# Patient Record
Sex: MALE | Race: WHITE | NOT HISPANIC OR LATINO | Employment: OTHER | URBAN - METROPOLITAN AREA
[De-identification: names, ages, dates, MRNs, and addresses within clinical notes are randomized per-mention and may not be internally consistent; named-entity substitution may affect disease eponyms.]

---

## 2017-01-10 LAB
CREAT SERPL-MCNC: 1.52 MG/DL (ref 0.72–1.25)
GFR SERPL CREATININE-BSD FRML MDRD: 47 ML/MIN/1.73M2
GLUCOSE SERPL-MCNC: 114 MG/DL (ref 65–100)
POTASSIUM SERPL-SCNC: 4.6 MMOL/L (ref 3.5–5)

## 2017-10-31 ENCOUNTER — TRANSFERRED RECORDS (OUTPATIENT)
Dept: HEALTH INFORMATION MANAGEMENT | Facility: CLINIC | Age: 62
End: 2017-10-31

## 2017-10-31 LAB — HEP C HIM: NORMAL

## 2018-02-05 ENCOUNTER — OFFICE VISIT (OUTPATIENT)
Dept: FAMILY MEDICINE | Facility: OTHER | Age: 63
End: 2018-02-05
Payer: COMMERCIAL

## 2018-02-05 ENCOUNTER — TELEPHONE (OUTPATIENT)
Dept: FAMILY MEDICINE | Facility: OTHER | Age: 63
End: 2018-02-05

## 2018-02-05 VITALS
HEIGHT: 68 IN | OXYGEN SATURATION: 97 % | WEIGHT: 203.2 LBS | TEMPERATURE: 98.5 F | RESPIRATION RATE: 16 BRPM | DIASTOLIC BLOOD PRESSURE: 72 MMHG | BODY MASS INDEX: 30.8 KG/M2 | SYSTOLIC BLOOD PRESSURE: 124 MMHG | HEART RATE: 86 BPM

## 2018-02-05 DIAGNOSIS — J32.9 CHRONIC SINUSITIS, UNSPECIFIED LOCATION: Primary | ICD-10-CM

## 2018-02-05 DIAGNOSIS — I10 ESSENTIAL HYPERTENSION, BENIGN: ICD-10-CM

## 2018-02-05 DIAGNOSIS — G44.209 TENSION-TYPE HEADACHE, NOT INTRACTABLE, UNSPECIFIED CHRONICITY PATTERN: ICD-10-CM

## 2018-02-05 PROBLEM — E78.5 HYPERLIPIDEMIA: Status: ACTIVE | Noted: 2018-02-05

## 2018-02-05 PROBLEM — Z98.890 S/P COLONOSCOPIC POLYPECTOMY: Status: ACTIVE | Noted: 2018-02-05

## 2018-02-05 PROCEDURE — 99204 OFFICE O/P NEW MOD 45 MIN: CPT | Performed by: NURSE PRACTITIONER

## 2018-02-05 RX ORDER — LISINOPRIL 10 MG/1
TABLET ORAL
Refills: 1 | COMMUNITY
Start: 2017-12-24 | End: 2019-12-12

## 2018-02-05 RX ORDER — EZETIMIBE 10 MG/1
TABLET ORAL
Refills: 3 | COMMUNITY
Start: 2017-10-31 | End: 2019-12-12

## 2018-02-05 ASSESSMENT — PAIN SCALES - GENERAL: PAINLEVEL: NO PAIN (0)

## 2018-02-05 NOTE — MR AVS SNAPSHOT
After Visit Summary   2/5/2018    Krishna Dugan    MRN: [id number]           Patient Information     Date Of Birth          1955        Visit Information        Provider Department      2/5/2018 6:00 PM Mee Magdaleno APRN CNP [facility]        Today's Diagnoses     Chronic sinusitis, unspecified location    -  1    Tension-type headache, not intractable, unspecified chronicity pattern        Essential hypertension, benign          Care Instructions    - Start Zyrtec daily   - Start antibiotic treatment today for 10 days.   - Recommend Flonase daily 1-2 sprays  - Will call you to have imaging scheduled  - Follow up with Dr. Hernandez for allergy   - Follow up with me in 2 weeks.   - Continue 20mg lisinopril daily, check blood pressure daily and record readings if consistently over 140/90 please let me know.       LAM Byrd CNP            Follow-ups after your visit        Follow-up notes from your care team     Return in about 2 weeks (around 2/19/2018), or if symptoms worsen or fail to improve.      Future tests that were ordered for you today     Open Future Orders        Priority Expected Expires Ordered    CT Sinus w/o Contrast Routine  2/5/2019 2/5/2018    MR Brain w/o & w Contrast Routine  2/5/2019 2/5/2018            Who to contact     If you have questions or need follow up information about today's clinic visit or your schedule please contact [facility] directly at [phone].  Normal or non-critical lab and imaging results will be communicated to you by MyChart, letter or phone within 4 business days after the clinic has received the results. If you do not hear from us within 7 days, please contact the clinic through MyChart or phone. If you have a critical or abnormal lab result, we will notify you by phone as soon as possible.  Submit refill requests through [employer] or call your pharmacy and they will forward the refill request to us.  "Please allow 3 business days for your refill to be completed.          Additional Information About Your Visit        MyChart Information     [employer] lets you send messages to your doctor, view your test results, renew your prescriptions, schedule appointments and more. To sign up, go to [email or web address] . Click on \"Log in\" on the left side of the screen, which will take you to the Welcome page. Then click on \"Sign up Now\" on the right side of the page.     You will be asked to enter the access code listed below, as well as some personal information. Please follow the directions to create your username and password.     Your access code is: [email or web address]  Expires: 2018  6:53 PM     Your access code will  in 90 days. If you need help or a new code, please call your [facility] or [phone].        Care EveryWhere ID     This is your [facility] EveryWhere ID. This could be used by other organizations to access your [facility] medical records  [id number]        Your Vitals Were     Pulse Temperature Respirations Height Pulse Oximetry BMI (Body Mass Index)    86 98.5  F (36.9  C) (Temporal) 16 5' 7.72\" (1.72 m) 97% 31.16 kg/m2       Blood Pressure from Last 3 Encounters:   18 152/74   13 138/85    Weight from Last 3 Encounters:   18 203 lb 3.2 oz (92.2 kg)   13 197 lb (89.4 kg)                 Today's Medication Changes          These changes are accurate as of 18  6:53 PM.  If you have any questions, ask your nurse or doctor.               Start taking these medicines.        Dose/Directions    amoxicillin-clavulanate 875-125 MG per tablet   Commonly known as:  AUGMENTIN   Used for:  Chronic sinusitis, unspecified location   Started by:  Mee Magdaleno APRN CNP        Dose:  1 tablet   Take 1 tablet by mouth 2 times daily   Quantity:  20 tablet   Refills:  0            Where to get your medicines      These medications were sent to [id number] - [place] " [place], [place] - [address] AT [facility] [address]  [address], [facility] [address]     Phone:  [phone]     amoxicillin-clavulanate 875-125 MG per tablet                Primary Care Provider Office Phone # Fax #    Harshil ROJAS Nuñez [phone]       [facility] [address] [place] [address]        Equal Access to Services     [facility]: Hadii aad ku hadasho Soomaali, waaxda luqadaha, qaybta kaalmada adeegyada, waxay idiin hayaan adeeg kharash la'aan . So [facility] [phone].    ATENCIÓN: Si habla español, tiene a limon disposición servicios gratuitos de asistencia lingüística. [facility] [phone].    We comply with applicable federal civil rights laws and [place] laws. We do not discriminate on the basis of race, color, national origin, age, disability, sex, sexual orientation, or gender identity.            Thank you!     Thank you for choosing [facility]  for your care. Our goal is always to provide you with excellent care. Hearing back from our patients is one way we can continue to improve our services. Please take a few minutes to complete the written survey that you may receive in the mail after your visit with us. Thank you!             Your Updated Medication List - Protect others around you: Learn how to safely use, store and throw away your medicines at www.disposemymeds.org.          This list is accurate as of 2/5/18  6:53 PM.  Always use your most recent med list.                   Brand Name Dispense Instructions for use Diagnosis    amoxicillin-clavulanate 875-125 MG per tablet    AUGMENTIN    20 tablet    Take 1 tablet by mouth 2 times daily    Chronic sinusitis, unspecified location       azithromycin 250 MG tablet    ZITHROMAX    6 tablet    Two tablets first day, then one tablet daily for four days.    Congestion of paranasal sinus       CLARITIN 10 MG tablet   Generic drug:  loratadine      Take 10 mg by mouth daily     Perennial allergic rhinitis       ezetimibe 10 MG tablet    ZETIA     TK 1 T PO QD        fluticasone 50 MCG/ACT spray    FLONASE    1 Package    Spray 2 sprays into both nostrils daily    Perennial allergic rhinitis       * lisinopril 20 MG tablet    PRINIVIL/ZESTRIL     Take 20 mg by mouth daily.        * lisinopril 10 MG tablet    PRINIVIL/ZESTRIL          * omeprazole 40 MG capsule    priLOSEC     Take by mouth daily.        * omeprazole 20 MG CR capsule    priLOSEC     Take 20 mg by mouth        * Notice:  This list has 4 medication(s) that are the same as other medications prescribed for you. Read the directions carefully, and ask your doctor or other care provider to review them with you.
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
## 2018-02-06 NOTE — NURSING NOTE
"Chief Complaint   Patient presents with     Headache       Initial /74  Pulse 86  Temp 98.5  F (36.9  C) (Temporal)  Resp 16  Ht 5' 7.72\" (1.72 m)  Wt 203 lb 3.2 oz (92.2 kg)  SpO2 97%  BMI 31.16 kg/m2 Estimated body mass index is 31.16 kg/(m^2) as calculated from the following:    Height as of this encounter: 5' 7.72\" (1.72 m).    Weight as of this encounter: 203 lb 3.2 oz (92.2 kg).  Medication Reconciliation: complete  "

## 2018-02-06 NOTE — PATIENT INSTRUCTIONS
- Start Zyrtec daily   - Start antibiotic treatment today for 10 days.   - Recommend Flonase daily 1-2 sprays  - Will call you to have imaging scheduled  - Follow up with Dr. Hernandez for allergy   - Follow up with me in 2 weeks.   - Continue 20mg lisinopril daily, check blood pressure daily and record readings if consistently over 140/90 please let me know.       LAM Byrd CNP

## 2018-02-06 NOTE — TELEPHONE ENCOUNTER
Please help patient schedule his MRI and CT at [facility]. He will need oral sedation. Would like done on same day. Once order is faxed please call patient so he can schedule appointment.     LAM Byrd CNP

## 2018-02-06 NOTE — TELEPHONE ENCOUNTER
Spoke to patient and he would like this done in [place]. Orders faxed and they will call him to schedule.

## 2018-02-08 ENCOUNTER — TRANSFERRED RECORDS (OUTPATIENT)
Dept: HEALTH INFORMATION MANAGEMENT | Facility: CLINIC | Age: 63
End: 2018-02-08

## 2018-03-06 ENCOUNTER — TELEPHONE (OUTPATIENT)
Dept: ALLERGY | Facility: OTHER | Age: 63
End: 2018-03-06

## 2018-03-06 ENCOUNTER — OFFICE VISIT (OUTPATIENT)
Dept: ALLERGY | Facility: OTHER | Age: 63
End: 2018-03-06
Payer: COMMERCIAL

## 2018-03-06 VITALS
TEMPERATURE: 98.3 F | BODY MASS INDEX: 31.35 KG/M2 | SYSTOLIC BLOOD PRESSURE: 130 MMHG | DIASTOLIC BLOOD PRESSURE: 64 MMHG | WEIGHT: 204.5 LBS | HEART RATE: 78 BPM | OXYGEN SATURATION: 96 %

## 2018-03-06 DIAGNOSIS — J30.89 ALLERGIC RHINITIS DUE TO DUST MITE: Primary | ICD-10-CM

## 2018-03-06 DIAGNOSIS — J30.81 ALLERGIC RHINITIS DUE TO ANIMAL DANDER: ICD-10-CM

## 2018-03-06 DIAGNOSIS — J34.89 SINUS PRESSURE: ICD-10-CM

## 2018-03-06 PROCEDURE — 95004 PERQ TESTS W/ALRGNC XTRCS: CPT | Performed by: ALLERGY & IMMUNOLOGY

## 2018-03-06 PROCEDURE — 99204 OFFICE O/P NEW MOD 45 MIN: CPT | Mod: 25 | Performed by: ALLERGY & IMMUNOLOGY

## 2018-03-06 RX ORDER — ASPIRIN 81 MG/1
81 TABLET, CHEWABLE ORAL
COMMUNITY
End: 2022-07-29

## 2018-03-06 RX ORDER — CETIRIZINE HYDROCHLORIDE 10 MG/1
10 TABLET ORAL DAILY
COMMUNITY
End: 2021-04-23

## 2018-03-06 RX ORDER — AZELASTINE 1 MG/ML
1-2 SPRAY, METERED NASAL 2 TIMES DAILY
Qty: 1 BOTTLE | Refills: 3 | Status: SHIPPED | OUTPATIENT
Start: 2018-03-06 | End: 2019-12-12

## 2018-03-06 RX ORDER — FLUTICASONE PROPIONATE 50 MCG
2 SPRAY, SUSPENSION (ML) NASAL DAILY
Qty: 1 BOTTLE | Refills: 11 | Status: SHIPPED | OUTPATIENT
Start: 2018-03-06 | End: 2023-07-17

## 2018-03-06 NOTE — ASSESSMENT & PLAN NOTE
Perennial nasal and ocular symptoms.  Associated sinus pressure.  Associated decreased sense of smell.  Recently had CT scan of sinuses.  I do not have these results.    Allergy testing positive for cat, dog, dust mites.    -Signed release of information to get CT scan of sinuses.  -Flonase 2 sprays per nostril daily.  -Astelin 2 sprays per nostril twice daily.  -Zyrtec or Allegra daily as needed.  -Allergen avoidance measures were discussed and literature provided.  -Consider allergen immunotherapy.  -If CT scan of sinuses shows chronic sinusitis will treat with extended course of antibiotics and possibly prednisone.  -Return to clinic in 2 months.

## 2018-03-06 NOTE — LETTER
3/6/2018         RE: Krishna Dugan  [address] [facility] [address]        Dear Colleague,    Thank you for referring your patient, Krishna Dugan, to the [facility]. Please see a copy of my visit note below.    Krishna Dugan is a 62 year old White male with previous medical history significant for hyperlipidemia and allergic rhinitis. Krishna Dugan is being seen today for evaluation of nasal congestion.     The patient reports that for years he has had perennial rhinorrhea, nasal itching, sneezing, congestion, postnasal drainage, ocular itching, ocular watering and ocular redness.  He additionally endorses persistent sinus pressure behind his eyes.  He endorses a decreased sense of smell.  These symptoms are present daily.  He has clear nasal discharge.  No history of sinus surgery.  He recently had a CT scan of the sinuses and an MRI of his brain but these reports have not been sent to [facility] yet.  He reports a persistent cracking and popping in his head.  He was seen by ENT 1-2 years ago.  No interventions were done.  He uses Flonase intermittently in the morning and has found it to be beneficial, but the effect decreased the longer he used it.  Oral antihistamines have not been beneficial.  He underwent allergy testing in 2008.  I reviewed this.  He was allergic to cats and dogs.  He reports cats cause increased symptoms.  He has a dog in his home.  No history of allergen immunotherapy.  No history of nasal polyposis.  Symptoms are additionally made worse with perfumes.    The patient has no history of asthma, eczema, food allergies, medications allergies or hives.     ENVIRONMENTAL HISTORY: [place] heated with a forced air. They does have central air conditioning. The patient's bedroom is furnished with hard césar in bedroom and fabric window coverings.  Pets inside the house include 2  outdoorcat(s)  and 1 dog(s). There is not history of cockroach or mice infestation. There is/are 0 smokers in the house.  The house does not have a damp basement.     Past Medical History:   Diagnosis Date     Diagnostic skin and sensitization tests 9/08 skin tests pos. for cat/dog ONLY (all other environmental allergens NEGATIVE)     History reviewed. No pertinent family history.  History reviewed. No pertinent surgical history.    REVIEW OF SYSTEMS:  General: negative for weight gain. negative for weight loss. negative for changes in sleep.   Ears: negative for fullness. positive  for hearing loss. negative for dizziness.   Nose: positive  for snoring.positive  for changes in smell. positive  for drainage.   Eyes: positive for eye watering. positive  for eye itching. negative for vision changes. positive  for eye redness.  Throat: negative for hoarseness. positive  for sore throat. negative for trouble swallowing.   Lungs: negative for shortness of breath.negative for wheezing. negative for sputum production.   Cardiovascular: negative for chest pain. positive  for swelling of ankles. positive  for fast or irregular heartbeat.   Gastrointestinal: negative for nausea. positive  for heartburn. positive  for acid reflux.   Musculoskeletal: negative for joint pain. positive  for joint stiffness. negative for joint swelling.   Neurologic: negative for seizures. negative for fainting. positive  for weakness.   Psychiatric: negative for changes in mood. positive  for anxiety.   Endocrine: negative for cold intolerance. negative for heat intolerance. negative for tremors.   Lymphatic: negative for lower extremity swelling. negative for lymph node swelling.   Hematologic: negative for easy bruising. negative for easy bleeding.  Integumentary: negative for rash. negative for scaling. negative for nail changes.       Current Outpatient Prescriptions:      aspirin 81 MG chewable tablet, Take 81 mg by mouth, Disp: , Rfl:      VITAMIN D,  CHOLECALCIFEROL, PO, Take 5,000 Units by mouth daily, Disp: , Rfl:      cetirizine (ZYRTEC) 10 MG tablet, Take 10 mg by mouth daily, Disp: , Rfl:      azelastine (ASTELIN) 0.1 % spray, Spray 1-2 sprays into both nostrils 2 times daily, Disp: 1 Bottle, Rfl: 3     fluticasone (FLONASE) 50 MCG/ACT spray, Spray 2 sprays into both nostrils daily, Disp: 1 Bottle, Rfl: 11     ezetimibe (ZETIA) 10 MG tablet, TK 1 T PO QD, Disp: , Rfl: 3     lisinopril (PRINIVIL/ZESTRIL) 10 MG tablet, , Disp: , Rfl: 1     omeprazole (PRILOSEC) 40 MG capsule, Take by mouth daily. , Disp: , Rfl:      omeprazole (PRILOSEC) 20 MG CR capsule, Take 20 mg by mouth, Disp: , Rfl:      amoxicillin-clavulanate (AUGMENTIN) 875-125 MG per tablet, Take 1 tablet by mouth 2 times daily (Patient not taking: Reported on 3/6/2018), Disp: 20 tablet, Rfl: 0     loratadine (CLARITIN) 10 MG tablet, Take 10 mg by mouth daily, Disp: , Rfl:      azithromycin (ZITHROMAX) 250 MG tablet, Two tablets first day, then one tablet daily for four days. (Patient not taking: Reported on 3/6/2018), Disp: 6 tablet, Rfl: 0     lisinopril (PRINIVIL,ZESTRIL) 20 MG tablet, Take 20 mg by mouth daily. , Disp: , Rfl:     There is no immunization history on file for this patient.  Allergies   Allergen Reactions     Valdecoxib      Ears turn purple  Facial flushing  Bextra         EXAM:   Constitutional:  Appears well-developed and well-nourished. No distress.   HEENT:   Head: Normocephalic.   Right Ear: External ear normal. TM normal  Left Ear: External ear normal. TM normal  Mouth/Throat: No oropharyngeal exudate present.   No cobblestoning of posterior oropharynx.   Boggy nasal tissue and pale.    Eyes: Conjunctivae are non-erythematous. Ocular watering noted.   No maxillary or frontal sinus tenderness to palpation.   Cardiovascular: Normal rate, regular rhythm and normal heart sounds. Exam reveals no gallop and no friction rub.   No murmur heard.  Respiratory: Effort normal and breath  sounds normal. No respiratory distress. No wheezes. No rales.   Musculoskeletal: Normal range of motion.   Lymphadenopathy:   No cervical adenopathy.   No lower extremity edema.   Neuro: Oriented to person, place, and time.  Skin: Skin is warm and dry. No rash noted.   Psychiatric: Normal mood and affect.     Nursing note and vitals reviewed.      WORKUP:   Skin testing  Positive for cats, dogs and both species of dust mites.     ASSESSMENT/PLAN:  Problem List Items Addressed This Visit        Respiratory    Allergic rhinitis due to animal dander    Relevant Medications    cetirizine (ZYRTEC) 10 MG tablet    azelastine (ASTELIN) 0.1 % spray    fluticasone (FLONASE) 50 MCG/ACT spray    Other Relevant Orders    ALLERGY SKIN TESTS,ALLERGENS (Completed)    Allergic rhinitis due to dust mite - Primary     Perennial nasal and ocular symptoms.  Associated sinus pressure.  Associated decreased sense of smell.  Recently had CT scan of sinuses.  I do not have these results.    Allergy testing positive for cat, dog, dust mites.    -Signed release of information to get CT scan of sinuses.  -Flonase 2 sprays per nostril daily.  -Astelin 2 sprays per nostril twice daily.  -Zyrtec or Allegra daily as needed.  -Allergen avoidance measures were discussed and literature provided.  -Consider allergen immunotherapy.  -If CT scan of sinuses shows chronic sinusitis will treat with extended course of antibiotics and possibly prednisone.  -Return to clinic in 2 months.         Relevant Medications    cetirizine (ZYRTEC) 10 MG tablet    azelastine (ASTELIN) 0.1 % spray    fluticasone (FLONASE) 50 MCG/ACT spray    Other Relevant Orders    ALLERGY SKIN TESTS,ALLERGENS (Completed)    Sinus pressure    Relevant Orders    ALLERGY SKIN TESTS,ALLERGENS (Completed)          Chart documentation with Dragon Voice recognition Software. Although reviewed after completion, some words and grammatical errors may remain.    Ted Hernandez, DO    Allergy/Immunology  [facility], [place] [facility]      Again, thank you for allowing me to participate in the care of your patient.        Sincerely,        Ted Hernandez, DO

## 2018-03-06 NOTE — PATIENT INSTRUCTIONS
Allergy Staff Appt Hours Shot Hours Locations    Physician     Ted Hernandez DO       Support Staff     Wendy VUONG RN      Janki SILVA MA  Monday:                      [place] 8-7     Tuesday:         [place] 8-5     Wednesday:        [place]: 7-5     Friday:        [place] 7-5   [place]        Monday: 9-5:50        Wednesday: 2-5:50        Friday: 7-12:50     [place]        Tuesday: 7-10:50        Thursday: 1:30-6:30     [place] Monday: 7:10-4:50        Tuesday: 12:30-6:30        Thursday: 7-11:50 [facility]  [address] [place] [address]  Appt Line: [phone]  Allergy RN (Monday):  [phone]    [facility]  [address] [place] [address]  Appt Line: [phone]  Allergy RN (Tues & Wed):  [phone]    [facility]  [address] [place] [address]  Appt Line: [phone]  Allergy RN (Friday):  [phone]       Important Scheduling Information  Aspirin Desensitization: Appt will last 2 clinic days. Please call the Allergy RN line for your clinic to schedule. Discontinue antihistamines 7 days prior to the appointment.     Food Challenges: Appt will last 3-4 hours. Please call the Allergy RN line for your clinic to schedule. Discontinue antihistamines 7 days prior to the appointment.     Penicillin Testing: Appt will last 2-3 hours. Please call the Allergy RN line for your clinic to schedule. Discontinue antihistamines 7 days prior to the appointment.     Skin Testing: Appt will about 40 minutes. Call the appointment line for your clinic to schedule. Discontinue antihistamines 7 days prior to the appointment.     Venom Testing: Appt will last 2-3 hours. Please call the Allergy RN line for your clinic to schedule. Discontinue antihistamines 7 days prior to the appointment.     Thank you for trusting us with your Allergy, Asthma, and Immunology care. Please feel free to contact us with any questions or concerns you may have.      - Flonase 2  sprays/nostril daily.   - Azelastine 2 sprays/nostril twice daily as needed.   - Zyrtec, Allegra, Claritin or Xyzal daily as needed.   - Consider allergy shots.   - Awaiting ct of sinuses results.     AEROALLERGEN AVOIDANCE INSTRUCTIONS  DUST MITES  Dust mites can never be entirely eliminated in the house no matter how clean your house is. Dust mites are attracted to warm, moist areas and feed on dead skin flakes. Here are tips to minimize dust mites in your home.  1.  Encase pillows and mattress/box springs in zippered allergy covers.  2.  Wash bedding in hot water (at least 130 F) every 7-14 days.  3.  Avoid curtains, carpet, and upholstered furniture if possible.  4.  Use HEPA air filters and a HEPA filter vacuum . Change filters monthly. Vacuum weekly.  5.  Keep bedroom simple, avoiding clutter, so it can quickly be dusted.  6.  Cover heating vents with vent filters.  7.  Keep stuffed toys in a closed container and wash or freeze regularly.  8.  Keep clothing in the closet with the door closed.  PETS  Pets present many problems for people with allergies. Dander from pets is very difficult to remove and also is a food source for dust mites.  1.  If possible, find the pet a new home.  2.  If not possible, keep the pet outdoors. Never allow the pet into the bedroom.  3.  Wash pet weekly in warm water.  4.  Encase mattresses, pillows, and box springs in allergen-proof covers.  5.  Use HEPA air filters and a HEPA filter vacuum . Change filters monthly.      Controlling Allergens: Dust Mites  Constant exposure to allergens means constant allergy symptoms. That s why controlling or avoiding the allergens that cause your symptoms is an important part of your treatment. If you are allergic to dust mites, the tips below can help to lessen your exposure to dust mites.     Wash all bedding in hot water.   Dust mite allergy  Dust mites are a common cause of nasal allergies. These mites are tiny organisms that  live in bedding, upholstered furniture, and carpet. They live in warm, humid conditions. House-dust mites are almost impossible to get rid of. But you can keep them under control.  Make changes to your home  Some furniture, like sofas and chairs, hold dust mites. To lessen the problem:    Choose nonfabric upholstery, like leather or vinyl.    Replace horizontal blinds with pull-down shades or vertical blinds.    Use washable curtains instead of heavy drapes.    Have as little carpeting as possible.    Cover your mattress, box spring, and pillows in allergy-proof casings.  Housecleaning  Here are some tips:    Wash sheets, blankets, and mattress pads every 1 to 2 weeks in hot water (at least 130 F).    Remove stuffed animals and other things that collect dust, such as wall hangings, knickknacks, and books--especially in the bedroom.    Dust your home every week with a damp cloth. Vacuum once a week. Use HEPA (high efficiency particulate air) filters or double-ply bags in the vacuum . Or, use a vacuum designed to lessen allergens.    If someone else can t dust and vacuum for you, wearing a filter mask may help.  Reduce indoor humidity  Dust mites need moist air to live. Use a dehumidifier to reduce air moisture. Don t use humidifiers, or vaporizers.  Talk with your healthcare provider about other ways to reduce dust in your home. Ask about medicines that can help with your allergy symptoms.  Date Last Reviewed: 9/1/2016 2000-2017 [employer]. [address] [place] [address]. All rights reserved. This information is not intended as a substitute for professional medical care. Always follow your healthcare professional's instructions.        Controlling Allergens: Dust Mites in the Bedroom    Many people with asthma are allergic to dust mites. Dust mites are tiny bugs that live in warm, damp places. They are too small to see, but they live in mattresses, pillows, upholstered furniture,  and house dust. Dust mite allergy can cause asthma flare-ups. If you have this allergy, there are many steps you can take to control dust mites at home.  Take these steps to control dust mites in your bed and bedroom:  1. Keep all clothing in a closet, with the door shut.  2. Make sure the room is not too humid. It should be below 50% humidity.  3. Choose wood, leather, or vinyl for furniture instead of upholstery.  4. Wash all bedding, pillows, and stuffed toys in hot water (130 F) every week. Remove any items that cannot be washed.  5. Use special covers on pillows, mattresses, and box springs. These covers are made for people with allergies.  6. If you can, replace carpeting with tile or hardwood césar. Use washable throw rugs, or don't use rugs at all.  7. Dust furniture with a damp cloth at least once a week.  8. Use an air conditioner or a dehumidifier to reduce humidity and filter the air. Clean the filter regularly.  9. Use pull-down shades or vertical window blinds that can be easily cleaned. Use these instead of curtains or drapes.  10. Use filters over heater vents.  Date Last Reviewed: 10/1/2016    7417-6666 [employer]. [address] [place] [address], [place] [address]. All rights reserved. This information is not intended as a substitute for professional medical care. Always follow your healthcare professional's instructions.

## 2018-03-06 NOTE — NURSING NOTE
Per provider verbal order, placed Adult Environmental Panel scratch test.  Consent was obtained prior to procedure.  Once panels were placed, patient was monitored for 15 minutes in clinic.  RN read test after 15 minutes and provider was notified of results.  Pt tolerated procedure well.  All questions and concerns were addressed at office visit.     Wendy Maciel RN

## 2018-03-06 NOTE — PROGRESS NOTES
Krishna Dugan is a 62 year old White male with previous medical history significant for hyperlipidemia and allergic rhinitis. Krishna Dugan is being seen today for evaluation of nasal congestion.     The patient reports that for years he has had perennial rhinorrhea, nasal itching, sneezing, congestion, postnasal drainage, ocular itching, ocular watering and ocular redness.  He additionally endorses persistent sinus pressure behind his eyes.  He endorses a decreased sense of smell.  These symptoms are present daily.  He has clear nasal discharge.  No history of sinus surgery.  He recently had a CT scan of the sinuses and an MRI of his brain but these reports have not been sent to [place] yet.  He reports a persistent cracking and popping in his head.  He was seen by ENT 1-2 years ago.  No interventions were done.  He uses Flonase intermittently in the morning and has found it to be beneficial, but the effect decreased the longer he used it.  Oral antihistamines have not been beneficial.  He underwent allergy testing in 2008.  I reviewed this.  He was allergic to cats and dogs.  He reports cats cause increased symptoms.  He has a dog in his home.  No history of allergen immunotherapy.  No history of nasal polyposis.  Symptoms are additionally made worse with perfumes.    The patient has no history of asthma, eczema, food allergies, medications allergies or hives.     ENVIRONMENTAL HISTORY: [place] The home is heated with a forced air. They does have central air conditioning. The patient's bedroom is furnished with hard césar in bedroom and fabric window coverings.  Pets inside the house include 2  outdoorcat(s) and 1 dog(s). There is not history of cockroach or mice infestation. There is/are 0 smokers in the house.  The house does not have a damp basement.     Past Medical History:   Diagnosis Date     Diagnostic skin and sensitization tests 9/08 skin tests  pos. for cat/dog ONLY (all other environmental allergens NEGATIVE)     History reviewed. No pertinent family history.  History reviewed. No pertinent surgical history.    REVIEW OF SYSTEMS:  General: negative for weight gain. negative for weight loss. negative for changes in sleep.   Ears: negative for fullness. positive  for hearing loss. negative for dizziness.   Nose: positive  for snoring.positive  for changes in smell. positive  for drainage.   Eyes: positive for eye watering. positive  for eye itching. negative for vision changes. positive  for eye redness.  Throat: negative for hoarseness. positive  for sore throat. negative for trouble swallowing.   Lungs: negative for shortness of breath.negative for wheezing. negative for sputum production.   Cardiovascular: negative for chest pain. positive  for swelling of ankles. positive  for fast or irregular heartbeat.   Gastrointestinal: negative for nausea. positive  for heartburn. positive  for acid reflux.   Musculoskeletal: negative for joint pain. positive  for joint stiffness. negative for joint swelling.   Neurologic: negative for seizures. negative for fainting. positive  for weakness.   Psychiatric: negative for changes in mood. positive  for anxiety.   Endocrine: negative for cold intolerance. negative for heat intolerance. negative for tremors.   Lymphatic: negative for lower extremity swelling. negative for lymph node swelling.   Hematologic: negative for easy bruising. negative for easy bleeding.  Integumentary: negative for rash. negative for scaling. negative for nail changes.       Current Outpatient Prescriptions:      aspirin 81 MG chewable tablet, Take 81 mg by mouth, Disp: , Rfl:      VITAMIN D, CHOLECALCIFEROL, PO, Take 5,000 Units by mouth daily, Disp: , Rfl:      cetirizine (ZYRTEC) 10 MG tablet, Take 10 mg by mouth daily, Disp: , Rfl:      azelastine (ASTELIN) 0.1 % spray, Spray 1-2 sprays into both nostrils 2 times daily, Disp: 1 Bottle,  Rfl: 3     fluticasone (FLONASE) 50 MCG/ACT spray, Spray 2 sprays into both nostrils daily, Disp: 1 Bottle, Rfl: 11     ezetimibe (ZETIA) 10 MG tablet, TK 1 T PO QD, Disp: , Rfl: 3     lisinopril (PRINIVIL/ZESTRIL) 10 MG tablet, , Disp: , Rfl: 1     omeprazole (PRILOSEC) 40 MG capsule, Take by mouth daily. , Disp: , Rfl:      omeprazole (PRILOSEC) 20 MG CR capsule, Take 20 mg by mouth, Disp: , Rfl:      amoxicillin-clavulanate (AUGMENTIN) 875-125 MG per tablet, Take 1 tablet by mouth 2 times daily (Patient not taking: Reported on 3/6/2018), Disp: 20 tablet, Rfl: 0     loratadine (CLARITIN) 10 MG tablet, Take 10 mg by mouth daily, Disp: , Rfl:      azithromycin (ZITHROMAX) 250 MG tablet, Two tablets first day, then one tablet daily for four days. (Patient not taking: Reported on 3/6/2018), Disp: 6 tablet, Rfl: 0     lisinopril (PRINIVIL,ZESTRIL) 20 MG tablet, Take 20 mg by mouth daily. , Disp: , Rfl:     There is no immunization history on file for this patient.  Allergies   Allergen Reactions     Valdecoxib      Ears turn purple  Facial flushing  Bextra         EXAM:   Constitutional:  Appears well-developed and well-nourished. No distress.   HEENT:   Head: Normocephalic.   Right Ear: External ear normal. TM normal  Left Ear: External ear normal. TM normal  Mouth/Throat: No oropharyngeal exudate present.   No cobblestoning of posterior oropharynx.   Boggy nasal tissue and pale.    Eyes: Conjunctivae are non-erythematous. Ocular watering noted.   No maxillary or frontal sinus tenderness to palpation.   Cardiovascular: Normal rate, regular rhythm and normal heart sounds. Exam reveals no gallop and no friction rub.   No murmur heard.  Respiratory: Effort normal and breath sounds normal. No respiratory distress. No wheezes. No rales.   Musculoskeletal: Normal range of motion.   Lymphadenopathy:   No cervical adenopathy.   No lower extremity edema.   Neuro: Oriented to person, place, and time.  Skin: Skin is warm and  dry. No rash noted.   Psychiatric: Normal mood and affect.     Nursing note and vitals reviewed.      WORKUP:   Skin testing  Positive for cats, dogs and both species of dust mites.     ASSESSMENT/PLAN:  Problem List Items Addressed This Visit        Respiratory    Allergic rhinitis due to animal dander    Relevant Medications    cetirizine (ZYRTEC) 10 MG tablet    azelastine (ASTELIN) 0.1 % spray    fluticasone (FLONASE) 50 MCG/ACT spray    Other Relevant Orders    ALLERGY SKIN TESTS,ALLERGENS (Completed)    Allergic rhinitis due to dust mite - Primary     Perennial nasal and ocular symptoms.  Associated sinus pressure.  Associated decreased sense of smell.  Recently had CT scan of sinuses.  I do not have these results.    Allergy testing positive for cat, dog, dust mites.    -Signed release of information to get CT scan of sinuses.  -Flonase 2 sprays per nostril daily.  -Astelin 2 sprays per nostril twice daily.  -Zyrtec or Allegra daily as needed.  -Allergen avoidance measures were discussed and literature provided.  -Consider allergen immunotherapy.  -If CT scan of sinuses shows chronic sinusitis will treat with extended course of antibiotics and possibly prednisone.  -Return to clinic in 2 months.         Relevant Medications    cetirizine (ZYRTEC) 10 MG tablet    azelastine (ASTELIN) 0.1 % spray    fluticasone (FLONASE) 50 MCG/ACT spray    Other Relevant Orders    ALLERGY SKIN TESTS,ALLERGENS (Completed)    Sinus pressure    Relevant Orders    ALLERGY SKIN TESTS,ALLERGENS (Completed)          Chart documentation with Dragon Voice recognition Software. Although reviewed after completion, some words and grammatical errors may remain.    Ted Hernandez,    Allergy/Immunology  [facility] [place] and [place], [place]

## 2018-03-06 NOTE — MR AVS SNAPSHOT
After Visit Summary   3/6/2018    Krishna Dugan    MRN: [id number]           Patient Information     Date Of Birth          1955        Visit Information        Provider Department      3/6/2018 3:00 PM Ted Hernandez DO [facility]        Today's Diagnoses     Allergic rhinitis due to dust mite    -  1    Allergic rhinitis due to animal dander        Perennial allergic rhinitis          Care Instructions    Allergy Staff Appt Hours Shot Hours Locations    Physician     Ted Hernandez DO       Support Staff     COLETTE Eagle MA  Monday:                      [place] 8-7     Tuesday:         [place] 8-5     Wednesday:        [place]: 7-5     Friday:        [place] 7-5   [place]        Monday: 9-5:50        Wednesday: 2-5:50        Friday: 7-12:50     [place]        Tuesday: 7-10:50        Thursday: 1:30-6:30     [place] Monday: 7:10-4:50        Tuesday: 12:30-6:30        Thursday: 7-11:50 [facility]  [address] [place] [address]  Appt Line: [phone]  Allergy RN (Monday):  [phone]    [facility]  [address] [place] [address]  Appt Line: [phone]  Allergy RN (Tues & Wed):  [phone]    [facility]  [address] [place] [address]  Appt Line: [phone]  Allergy RN (Friday):  [phone]       Important Scheduling Information  Aspirin Desensitization: Appt will last 2 clinic days. Please call the Allergy RN line for your clinic to schedule. Discontinue antihistamines 7 days prior to the appointment.     Food Challenges: Appt will last 3-4 hours. Please call the Allergy RN line for your clinic to schedule. Discontinue antihistamines 7 days prior to the appointment.     Penicillin Testing: Appt will last 2-3 hours. Please call the Allergy RN line for your clinic to schedule. Discontinue antihistamines 7 days prior to the appointment.     Skin Testing: Appt will about 40 minutes. Call  the appointment line for your clinic to schedule. Discontinue antihistamines 7 days prior to the appointment.     Venom Testing: Appt will last 2-3 hours. Please call the Allergy RN line for your clinic to schedule. Discontinue antihistamines 7 days prior to the appointment.     Thank you for trusting us with your Allergy, Asthma, and Immunology care. Please feel free to contact us with any questions or concerns you may have.      - Flonase 2 sprays/nostril daily.   - Azelastine 2 sprays/nostril twice daily as needed.   - Zyrtec, Allegra, Claritin or Xyzal daily as needed.   - Consider allergy shots.   - Awaiting ct of sinuses results.     AEROALLERGEN AVOIDANCE INSTRUCTIONS  DUST MITES  Dust mites can never be entirely eliminated in the house no matter how clean your house is. Dust mites are attracted to warm, moist areas and feed on dead skin flakes. Here are tips to minimize dust mites in your home.  1.  Encase pillows and mattress/box springs in zippered allergy covers.  2.  Wash bedding in hot water (at least 130 F) every 7-14 days.  3.  Avoid curtains, carpet, and upholstered furniture if possible.  4.  Use HEPA air filters and a HEPA filter vacuum . Change filters monthly. Vacuum weekly.  5.  Keep bedroom simple, avoiding clutter, so it can quickly be dusted.  6.  Cover heating vents with vent filters.  7.  Keep stuffed toys in a closed container and wash or freeze regularly.  8.  Keep clothing in the closet with the door closed.  PETS  Pets present many problems for people with allergies. Dander from pets is very difficult to remove and also is a food source for dust mites.  1.  If possible, find the pet a new home.  2.  If not possible, keep the pet outdoors. Never allow the pet into the bedroom.  3.  Wash pet weekly in warm water.  4.  Encase mattresses, pillows, and box springs in allergen-proof covers.  5.  Use HEPA air filters and a HEPA filter vacuum . Change filters  monthly.      Controlling Allergens: Dust Mites  Constant exposure to allergens means constant allergy symptoms. That s why controlling or avoiding the allergens that cause your symptoms is an important part of your treatment. If you are allergic to dust mites, the tips below can help to lessen your exposure to dust mites.     Wash all bedding in hot water.   Dust mite allergy  Dust mites are a common cause of nasal allergies. These mites are tiny organisms that live in bedding, upholstered furniture, and carpet. They live in warm, humid conditions. House-dust mites are almost impossible to get rid of. But you can keep them under control.  Make changes to your home  Some furniture, like sofas and chairs, hold dust mites. To lessen the problem:    Choose nonfabric upholstery, like leather or vinyl.    Replace horizontal blinds with pull-down shades or vertical blinds.    Use washable curtains instead of heavy drapes.    Have as little carpeting as possible.    Cover your mattress, box spring, and pillows in allergy-proof casings.  Housecleaning  Here are some tips:    Wash sheets, blankets, and mattress pads every 1 to 2 weeks in hot water (at least 130 F).    Remove stuffed animals and other things that collect dust, such as wall hangings, knickknacks, and books--especially in the bedroom.    Dust your home every week with a damp cloth. Vacuum once a week. Use HEPA (high efficiency particulate air) filters or double-ply bags in the vacuum . Or, use a vacuum designed to lessen allergens.    If someone else can t dust and vacuum for you, wearing a filter mask may help.  Reduce indoor humidity  Dust mites need moist air to live. Use a dehumidifier to reduce air moisture. Don t use humidifiers, or vaporizers.  Talk with your healthcare provider about other ways to reduce dust in your home. Ask about medicines that can help with your allergy symptoms.  Date Last Reviewed: 9/1/2016 2000-2017 [employer]. [address] [place], [place] [id number]. All rights reserved. This information is not intended as a substitute for professional medical care. Always follow your healthcare professional's instructions.        Controlling Allergens: Dust Mites in the Bedroom    Many people with asthma are allergic to dust mites. Dust mites are tiny bugs that live in warm, damp places. They are too small to see, but they live in mattresses, pillows, upholstered furniture, and house dust. Dust mite allergy can cause asthma flare-ups. If you have this allergy, there are many steps you can take to control dust mites at home.  Take these steps to control dust mites in your bed and bedroom:  1. Keep all clothing in a closet, with the door shut.  2. Make sure the room is not too humid. It should be below 50% humidity.  3. Choose wood, leather, or vinyl for furniture instead of upholstery.  4. Wash all bedding, pillows, and stuffed toys in hot water (130 F) every week. Remove any items that cannot be washed.  5. Use special covers on pillows, mattresses, and box springs. These covers are made for people with allergies.  6. If you can, replace carpeting with tile or hardwood césar. Use washable throw rugs, or don't use rugs at all.  7. Dust furniture with a damp cloth at least once a week.  8. Use an air conditioner or a dehumidifier to reduce humidity and filter the air. Clean the filter regularly.  9. Use pull-down shades or vertical window blinds that can be easily cleaned. Use these instead of curtains or drapes.  10. Use filters over heater vents.  Date Last Reviewed: 10/1/2016    2624-3404 [employer]. [address] [place] [address]. All rights reserved. This information is not intended as a substitute for professional medical care. Always follow your healthcare professional's instructions.                Follow-ups after your visit        Who to contact     If you have questions or need follow  "up information about today's clinic visit or your schedule please contact [facility] directly at [phone].  Normal or non-critical lab and imaging results will be communicated to you by MyChart, letter or phone within 4 business days after the clinic has received the results. If you do not hear from us within 7 days, please contact the clinic through [employer]Rhart or phone. If you have a critical or abnormal lab result, we will notify you by phone as soon as possible.  Submit refill requests through [employer] or call your pharmacy and they will forward the refill request to us. Please allow 3 business days for your refill to be completed.          Additional Information About Your Visit        MyChart Information     [employer] lets you send messages to your doctor, view your test results, renew your prescriptions, schedule appointments and more. To sign up, go to [email or web address] . Click on \"Log in\" on the left side of the screen, which will take you to the Welcome page. Then click on \"Sign up Now\" on the right side of the page.     You will be asked to enter the access code listed below, as well as some personal information. Please follow the directions to create your username and password.     Your access code is: [email or web address]  Expires: 2018  6:53 PM     Your access code will  in 90 days. If you need help or a new code, please call your [place] clinic or [phone].        Care EveryWhere ID     This is your Care EveryWhere ID. This could be used by other organizations to access your [place] medical records  [id number]        Your Vitals Were     Pulse Temperature Pulse Oximetry BMI (Body Mass Index)          78 98.3  F (36.8  C) (Oral) 96% 31.35 kg/m2         Blood Pressure from Last 3 Encounters:   18 130/64   18 124/72   13 138/85    Weight from Last 3 Encounters:   18 92.8 kg (204 lb 8 oz)   18 92.2 kg (203 lb 3.2 oz)   13 89.4 kg (197 lb)    "           Today, you had the following     No orders found for display         Today's Medication Changes          These changes are accurate as of 3/6/18  4:11 PM.  If you have any questions, ask your nurse or doctor.               Start taking these medicines.        Dose/Directions    azelastine 0.1 % spray   Commonly known as:  ASTELIN   Used for:  Allergic rhinitis due to dust mite, Allergic rhinitis due to animal dander   Started by:  Ted Hernandez,         Dose:  1-2 spray   Spray 1-2 sprays into both nostrils 2 times daily   Quantity:  1 Bottle   Refills:  3            Where to get your medicines      These medications were sent to [facility] [address] [facility] [address] AT [facility] [address]  [address] [facility] [address], [place] [address]     Phone:  [phone]     azelastine 0.1 % spray    fluticasone 50 MCG/ACT spray                Primary Care Provider Office Phone # Fax #    Harshil Nuñez [phone]       [facility] [address] [place] [address]        Equal Access to Services     [facility] AH: Hadii aad ku hadasho Soomaali, waaxda luqadaha, qaybta kaalmada adeegyada, radha tadeo . So [facility] [phone].    ATENCIÓN: Si habla español, tiene a limon disposición servicios gratuitos de asistencia lingüística. [facility] [phone].    We comply with applicable federal civil rights laws and [place] laws. We do not discriminate on the basis of race, color, national origin, age, disability, sex, sexual orientation, or gender identity.            Thank you!     Thank you for choosing [facility]  for your care. Our goal is always to provide you with excellent care. Hearing back from our patients is one way we can continue to improve our services. Please take a few minutes to complete the written survey that you may receive in the mail after your visit with us. Thank you!             Your Updated  Medication List - Protect others around you: Learn how to safely use, store and throw away your medicines at www.disposemymeds.org.          This list is accurate as of 3/6/18  4:11 PM.  Always use your most recent med list.                   Brand Name Dispense Instructions for use Diagnosis    amoxicillin-clavulanate 875-125 MG per tablet    AUGMENTIN    20 tablet    Take 1 tablet by mouth 2 times daily    Chronic sinusitis, unspecified location       aspirin 81 MG chewable tablet      Take 81 mg by mouth        azelastine 0.1 % spray    ASTELIN    1 Bottle    Spray 1-2 sprays into both nostrils 2 times daily    Allergic rhinitis due to dust mite, Allergic rhinitis due to animal dander       azithromycin 250 MG tablet    ZITHROMAX    6 tablet    Two tablets first day, then one tablet daily for four days.    Congestion of paranasal sinus       cetirizine 10 MG tablet    zyrTEC     Take 10 mg by mouth daily        CLARITIN 10 MG tablet   Generic drug:  loratadine      Take 10 mg by mouth daily    Perennial allergic rhinitis       ezetimibe 10 MG tablet    ZETIA     TK 1 T PO QD        fluticasone 50 MCG/ACT spray    FLONASE    1 Bottle    Spray 2 sprays into both nostrils daily    Perennial allergic rhinitis       * lisinopril 20 MG tablet    PRINIVIL/ZESTRIL     Take 20 mg by mouth daily.        * lisinopril 10 MG tablet    PRINIVIL/ZESTRIL          * omeprazole 40 MG capsule    priLOSEC     Take by mouth daily.        * omeprazole 20 MG CR capsule    priLOSEC     Take 20 mg by mouth        VITAMIN D (CHOLECALCIFEROL) PO      Take 5,000 Units by mouth daily        * Notice:  This list has 4 medication(s) that are the same as other medications prescribed for you. Read the directions carefully, and ask your doctor or other care provider to review them with you.

## 2018-03-06 NOTE — TELEPHONE ENCOUNTER
Release of information was faxed on 3/6/18 at 3:54 PM to outside clinic, [facility], @ [phone]., located in [place] off [address]. The phone number for this location is [phone]. Requested that records be faxed to [facility] @ [phone]. This patient is not transferring care to our office for allergen immunotherapy injections. Specific items requested include CT/MRI imaging. Will follow up in one week if records have not been received.     Wendy Maciel RN

## 2018-03-23 ENCOUNTER — DOCUMENTATION ONLY (OUTPATIENT)
Dept: ALLERGY | Facility: OTHER | Age: 63
End: 2018-03-23

## 2018-03-23 NOTE — PROGRESS NOTES
CT scan of sinuses and MRI of brain was normal. Results reviewed and will be scanned into electronic medical record. Please call and tell patient this information. If he remains symptomatic despite the treatment we started in clinic then I would recommend allergy shots. Thanks.     Dr. Hernandez

## 2018-03-23 NOTE — PROGRESS NOTES
RN attempted to call home number, but no answer and voicemail is not set up to leave messages.  Work number is invalid number.     Wendy Maciel RN

## 2018-03-23 NOTE — TELEPHONE ENCOUNTER
Spoke with [facility].  They will send images through PACS system, and will be faxing over reports to [facility] fax ([phone]).    Wendy Maciel RN

## 2018-05-23 ENCOUNTER — TELEPHONE (OUTPATIENT)
Dept: FAMILY MEDICINE | Facility: OTHER | Age: 63
End: 2018-05-23

## 2018-05-23 NOTE — TELEPHONE ENCOUNTER
Krishna Dugan is a 63 year old male who calls with fall and bruised hip.    NURSING ASSESSMENT:  Description:  Pt is scheduled on Dr. Tomas's schedule on 5/24/18 for fall, bruised hip and blood clot.  Called pt to triage to determine if it is okay that he waits until that appt to be seen.  Onset/duration:  1 1/2 weeks ago  Precip. factors:  Fell coming down last step of deck, landed on right hip  Associated symptoms:  Bruise on right hip, small hard area under skin where bruise is.  Denies warmth, swelling, redness, fever.  Pt is able to bear weight on right leg and has full ROM.  Improves/worsens symptoms:  improving  Pain scale (0-10)   2/10    Allergies:   Allergies   Allergen Reactions     Valdecoxib      Ears turn purple  Facial flushing  Bextra         RECOMMENDED DISPOSITION:  Keep appt on 5/24  Will comply with recommendation: Yes  If further questions/concerns or if symptoms do not improve, worsen or new symptoms develop, call your PCP or [facility] as soon as possible.      Guideline used: bruising  Telephone Triage Protocols for Nurses, Fifth Edition, Katiana Rob RN

## 2018-05-23 NOTE — PROGRESS NOTES
SUBJECTIVE:   Krishna Dugan is a 63 year old male who presents to clinic today for the following health issues:      HPI    Krishna Dugan is a 63 year old male who calls with fall and bruised hip.     NURSING ASSESSMENT:  Description:  Pt is scheduled on Dr. Tomas's schedule on 5/24/18 for fall, bruised hip and blood clot.  Called pt to triage to determine if it is okay that he waits until that appt to be seen.  Onset/duration:  1 1/2 weeks ago  Precip. factors:  Fell coming down last step of deck, landed on right hip  Associated symptoms:  Bruise on right hip, small hard area under skin where bruise is.  Denies warmth, swelling, redness, fever.  Pt is able to bear weight on right leg and has full ROM.  Improves/worsens symptoms:  improving  Pain scale (0-10)   2/10     Allergies:         Allergies   Allergen Reactions     Valdecoxib         Ears turn purple  Facial flushing  Bextra            RECOMMENDED DISPOSITION:  Keep appt on 5/24  Will comply with recommendation: Yes  If further questions/concerns or if symptoms do not improve, worsen or new symptoms develop, call your PCP or [facility] as soon as possible.        Guideline used: bruising  Telephone Triage Protocols for Nurses, Fifth Edition, Katiana Rob RN    Problem list and histories reviewed & adjusted, as indicated.  Additional history: as documented        Patient Active Problem List   Diagnosis     Corneal foreign body     Essential hypertension, benign     Flushing     Hyperlipidemia     S/P colonoscopic polypectomy     Allergic rhinitis due to animal dander     Allergic rhinitis due to dust mite     Sinus pressure     History reviewed. No pertinent surgical history.    Social History   Substance Use Topics     Smoking status: Former Smoker     Packs/day: 0.50     Years: 10.00     Types: Cigarettes     Smokeless tobacco: Never Used     Alcohol use Yes      Comment: very little     History reviewed. No  "pertinent family history.      Current Outpatient Prescriptions   Medication Sig Dispense Refill     aspirin 81 MG chewable tablet Take 81 mg by mouth       azelastine (ASTELIN) 0.1 % spray Spray 1-2 sprays into both nostrils 2 times daily 1 Bottle 3     cetirizine (ZYRTEC) 10 MG tablet Take 10 mg by mouth daily       ezetimibe (ZETIA) 10 MG tablet TK 1 T PO QD  3     fluticasone (FLONASE) 50 MCG/ACT spray Spray 2 sprays into both nostrils daily 1 Bottle 11     lisinopril (PRINIVIL,ZESTRIL) 20 MG tablet Take 20 mg by mouth daily.        omeprazole (PRILOSEC) 40 MG capsule Take by mouth daily.        lisinopril (PRINIVIL/ZESTRIL) 10 MG tablet   1     loratadine (CLARITIN) 10 MG tablet Take 10 mg by mouth daily       omeprazole (PRILOSEC) 20 MG CR capsule Take 20 mg by mouth       VITAMIN D, CHOLECALCIFEROL, PO Take 5,000 Units by mouth daily       Allergies   Allergen Reactions     Valdecoxib      Ears turn purple  Facial flushing  Bextra     BP Readings from Last 3 Encounters:   05/24/18 124/72   03/06/18 130/64   02/05/18 124/72    Wt Readings from Last 3 Encounters:   05/24/18 195 lb (88.5 kg)   03/06/18 204 lb 8 oz (92.8 kg)   02/05/18 203 lb 3.2 oz (92.2 kg)                  Labs reviewed in EPIC    ROS:  Constitutional, HEENT, cardiovascular, pulmonary, gi and gu systems are negative, except as otherwise noted.    OBJECTIVE:     /72 (BP Location: Left arm, Patient Position: Chair, Cuff Size: Adult Regular)  Pulse 90  Temp 97  F (36.1  C) (Temporal)  Resp 16  Ht 5' 7.7\" (1.72 m)  Wt 195 lb (88.5 kg)  SpO2 96%  BMI 29.91 kg/m2  Body mass index is 29.91 kg/(m^2).   Physical Exam   Constitutional: He is oriented to person, place, and time. He appears well-developed and well-nourished.   HENT:   Head: Normocephalic and atraumatic.   Right Ear: External ear normal.   Left Ear: External ear normal.   Nose: Nose normal.   Eyes: EOM are normal.   Neck: Neck supple.   Pulmonary/Chest: Effort normal and " breath sounds normal.   Abdominal: Soft. Bowel sounds are normal. He exhibits no distension. There is no tenderness. There is no rebound and no guarding.   Large bruise with hematoma noted on the right lateral abdominal wall.   Musculoskeletal: Normal range of motion. He exhibits no edema, tenderness or deformity.   Neurological: He is alert and oriented to person, place, and time. He displays normal reflexes. No cranial nerve deficit. He exhibits normal muscle tone. Coordination normal.   Psychiatric: He has a normal mood and affect.         Diagnostic Test Results:  none     ASSESSMENT/PLAN:     Problem List Items Addressed This Visit     None      Visit Diagnoses     Hematoma of skin    -  Primary    Fall, initial encounter        Contusion of abdominal wall, initial encounter               This is a very pleasant 63-year-old gentleman who is here for a evaluation after his recent fall.   he was on his way out of a trailer when he missed a step and fell forward hurting his abdomen.  He denies any loss of consciousness.  Musculoskeletal exam is negative for any joint or bone involvement.  He was noted to have a large hematoma with a bruise on the right lower abdominal wall.  Neurovascularly intact.  Advised conservative management.  Discussed home care  Reportable signs and symptoms discussed  RTC if symptoms persist or fail to improve      Huong Tomas MD  [facility]

## 2018-05-23 NOTE — TELEPHONE ENCOUNTER
"Patient has an appt with RK on 5/24 for \"Fell/ bruise with clot on left side of body\".  RN to triage.  Mary Neal CMA    "

## 2018-05-24 ENCOUNTER — OFFICE VISIT (OUTPATIENT)
Dept: FAMILY MEDICINE | Facility: OTHER | Age: 63
End: 2018-05-24
Payer: COMMERCIAL

## 2018-05-24 VITALS
SYSTOLIC BLOOD PRESSURE: 124 MMHG | BODY MASS INDEX: 29.55 KG/M2 | DIASTOLIC BLOOD PRESSURE: 72 MMHG | HEART RATE: 90 BPM | RESPIRATION RATE: 16 BRPM | WEIGHT: 195 LBS | OXYGEN SATURATION: 96 % | TEMPERATURE: 97 F | HEIGHT: 68 IN
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
## 2018-05-24 DIAGNOSIS — S30.1XXA CONTUSION OF ABDOMINAL WALL, INITIAL ENCOUNTER: ICD-10-CM

## 2018-05-24 DIAGNOSIS — T14.8XXA HEMATOMA OF SKIN: Primary | ICD-10-CM

## 2018-05-24 PROCEDURE — 99214 OFFICE O/P EST MOD 30 MIN: CPT | Performed by: FAMILY MEDICINE

## 2018-05-24 ASSESSMENT — PAIN SCALES - GENERAL: PAINLEVEL: NO PAIN (0)

## 2018-05-24 NOTE — MR AVS SNAPSHOT
"              After Visit Summary   5/24/2018    Krishna Dugan    MRN: [id number]           Patient Information     Date Of Birth          1955        Visit Information        Provider Department      5/24/2018 5:40 PM Huong Tomas MD [facility]        Today's Diagnoses     Screen for colon cancer        Need for hepatitis C screening test        Screening for HIV (human immunodeficiency virus)        Need for prophylactic vaccination with tetanus-diphtheria (TD)           Follow-ups after your visit        Who to contact     If you have questions or need follow up information about today's clinic visit or your schedule please contact [facility] directly at [phone].  Normal or non-critical lab and imaging results will be communicated to you by MyChart, letter or phone within 4 business days after the clinic has received the results. If you do not hear from us within 7 days, please contact the clinic through MyChart or phone. If you have a critical or abnormal lab result, we will notify you by phone as soon as possible.  Submit refill requests through [employer] or call your pharmacy and they will forward the refill request to us. Please allow 3 business days for your refill to be completed.          Additional Information About Your Visit        Care EveryWhere ID     This is your Care EveryWhere ID. This could be used by other organizations to access your [facility] medical records  [id number]        Your Vitals Were     Pulse Temperature Respirations Height Pulse Oximetry BMI (Body Mass Index)    90 97  F (36.1  C) (Temporal) 16 5' 7.7\" (1.72 m) 96% 29.91 kg/m2       Blood Pressure from Last 3 Encounters:   05/24/18 124/72   03/06/18 130/64   02/05/18 124/72    Weight from Last 3 Encounters:   05/24/18 195 lb (88.5 kg)   03/06/18 204 lb 8 oz (92.8 kg)   02/05/18 203 lb 3.2 oz (92.2 kg)              Today, you had the following     No orders found for display       " Primary Care Provider Office Phone # Fax #    Harshil Nuñez [phone]       [facility] [address] [place] [address]        Equal Access to Services     ELYSE LAKSHMI : Hadii aad ku hadsharrio Soomaali, waaxda luqadaha, qaybta kaalmada adeegyada, radha garnershira mahogany. So [facility] [phone].    ATENCIÓN: Si habla español, tiene a limon disposición servicios gratuitos de asistencia lingüística. [facility] [phone].    We comply with applicable federal civil rights laws and [place] laws. We do not discriminate on the basis of race, color, national origin, age, disability, sex, sexual orientation, or gender identity.            Thank you!     Thank you for choosing [facility]  for your care. Our goal is always to provide you with excellent care. Hearing back from our patients is one way we can continue to improve our services. Please take a few minutes to complete the written survey that you may receive in the mail after your visit with us. Thank you!             Your Updated Medication List - Protect others around you: Learn how to safely use, store and throw away your medicines at www.disposemymeds.org.          This list is accurate as of 5/24/18  6:11 PM.  Always use your most recent med list.                   Brand Name Dispense Instructions for use Diagnosis    aspirin 81 MG chewable tablet      Take 81 mg by mouth        azelastine 0.1 % spray    ASTELIN    1 Bottle    Spray 1-2 sprays into both nostrils 2 times daily    Allergic rhinitis due to dust mite, Allergic rhinitis due to animal dander       cetirizine 10 MG tablet    zyrTEC     Take 10 mg by mouth daily        CLARITIN 10 MG tablet   Generic drug:  loratadine      Take 10 mg by mouth daily    Perennial allergic rhinitis       ezetimibe 10 MG tablet    ZETIA     TK 1 T PO QD        fluticasone 50 MCG/ACT spray    FLONASE    1 Bottle    Spray 2 sprays into both nostrils daily        *  lisinopril 20 MG tablet    PRINIVIL/ZESTRIL     Take 20 mg by mouth daily.        * lisinopril 10 MG tablet    PRINIVIL/ZESTRIL          * omeprazole 40 MG capsule    priLOSEC     Take by mouth daily.        * omeprazole 20 MG CR capsule    priLOSEC     Take 20 mg by mouth        VITAMIN D (CHOLECALCIFEROL) PO      Take 5,000 Units by mouth daily        * Notice:  This list has 4 medication(s) that are the same as other medications prescribed for you. Read the directions carefully, and ask your doctor or other care provider to review them with you.

## 2018-07-20 ENCOUNTER — TELEPHONE (OUTPATIENT)
Dept: INTERNAL MEDICINE | Facility: OTHER | Age: 63
End: 2018-07-20

## 2018-07-20 NOTE — TELEPHONE ENCOUNTER
Per outreach, patient is aware of overdue screening and will call on their own time for scheduling.     Thanks      Outreach ,  Eulalia Stoner

## 2018-08-01 NOTE — PROGRESS NOTES
SUBJECTIVE:   Krishna Dugan is a 63 year old male who presents to clinic today for the following health issues:    HPI  Joint Pain Knee    Onset: 6 weeks    Description:   Location: left knee  Character: mild pain, to limping, to numb    Intensity: mild, moderate, severe    Progression of Symptoms: better    Accompanying Signs & Symptoms:    Other symptoms: numbness; after the days worth of work  History:   Previous similar pain: YES- left knee      Precipitating factors:   Trauma or overuse: YES- while kneeling    Alleviating factors:  Improved by: rest/inactivity    Therapies Tried and outcome: ibuprofen- helps      Problem list and histories reviewed & adjusted, as indicated.  Additional history: as documented        Patient Active Problem List   Diagnosis     Corneal foreign body     Essential hypertension, benign     Flushing     Hyperlipidemia     S/P colonoscopic polypectomy     Allergic rhinitis due to animal dander     Allergic rhinitis due to dust mite     Sinus pressure     History reviewed. No pertinent surgical history.    Social History   Substance Use Topics     Smoking status: Former Smoker     Packs/day: 0.50     Years: 10.00     Types: Cigarettes     Smokeless tobacco: Never Used     Alcohol use No      Comment: very little     History reviewed. No pertinent family history.      Current Outpatient Prescriptions   Medication Sig Dispense Refill     aspirin 81 MG chewable tablet Take 81 mg by mouth       azelastine (ASTELIN) 0.1 % spray Spray 1-2 sprays into both nostrils 2 times daily 1 Bottle 3     cetirizine (ZYRTEC) 10 MG tablet Take 10 mg by mouth daily       fluticasone (FLONASE) 50 MCG/ACT spray Spray 2 sprays into both nostrils daily 1 Bottle 11     lisinopril (PRINIVIL,ZESTRIL) 20 MG tablet Take 20 mg by mouth daily.        lisinopril (PRINIVIL/ZESTRIL) 10 MG tablet   1     omeprazole (PRILOSEC) 40 MG capsule Take by mouth daily.        VITAMIN D, CHOLECALCIFEROL, PO Take 5,000 Units  by mouth daily       ezetimibe (ZETIA) 10 MG tablet TK 1 T PO QD  3     loratadine (CLARITIN) 10 MG tablet Take 10 mg by mouth daily       omeprazole (PRILOSEC) 20 MG CR capsule Take 20 mg by mouth       Allergies   Allergen Reactions     Valdecoxib      Ears turn purple  Facial flushing  Bextra     Recent Labs   Lab Test 01/10/17   CR  1.52*   GFRESTIMATED  47*   GFRESTBLACK  57*   POTASSIUM  4.6      BP Readings from Last 3 Encounters:   08/06/18 130/70   05/24/18 124/72   03/06/18 130/64    Wt Readings from Last 3 Encounters:   08/06/18 204 lb (92.5 kg)   05/24/18 195 lb (88.5 kg)   03/06/18 204 lb 8 oz (92.8 kg)                  Labs reviewed in EPIC    ROS:  Constitutional, HEENT, cardiovascular, pulmonary, gi and gu systems are negative, except as otherwise noted.    OBJECTIVE:     /70  Pulse 88  Temp 98.4  F (36.9  C) (Oral)  Resp 14  Wt 204 lb (92.5 kg)  BMI 31.29 kg/m2  Body mass index is 31.29 kg/(m^2).   Physical Exam   Constitutional: He is oriented to person, place, and time. He appears well-developed and well-nourished.   HENT:   Head: Normocephalic and atraumatic.   Right Ear: External ear normal.   Left Ear: External ear normal.   Mouth/Throat: Oropharynx is clear and moist.   Eyes: EOM are normal.   Neck: Neck supple.   Cardiovascular: Normal rate, regular rhythm, normal heart sounds and intact distal pulses.  Exam reveals no gallop.    No murmur heard.  Pulmonary/Chest: Effort normal and breath sounds normal.   Abdominal: Soft. Bowel sounds are normal.   Musculoskeletal: Normal range of motion.   Right knee :TTP along the medial collateral ligament right knee. Positive thessalay test . Normal stabilty. No effusion. Normal ROM     Left shoulder- Negative Kern and Neer's test.    Neurological: He is alert and oriented to person, place, and time.   Psychiatric: He has a normal mood and affect. His behavior is normal. Judgment and thought content normal.         Diagnostic Test  Results:  none     ASSESSMENT/PLAN:     Problem List Items Addressed This Visit     None      Visit Diagnoses     Chronic left shoulder pain    -  Primary    Relevant Orders    XR Shoulder Left G/E 3 Views (Completed)    Acute pain of right knee        Relevant Orders    XR Knee Right 3 Views (Completed)         1. Chronic left shoulder pain  Xr suggestive of AC joint arthritis. Offered injection. Pt would like to wait  - XR Shoulder Left G/E 3 Views; Future    2. Acute pain of right knee  - Positive thessalay test indicating meniscal injury  -Xr negative  -Get MRI for further evaluation  -follow results and plan accordingly  - XR Knee Right 3 Views; Future      Huong Tomas MD  [facility]

## 2018-08-06 ENCOUNTER — RADIANT APPOINTMENT (OUTPATIENT)
Dept: GENERAL RADIOLOGY | Facility: OTHER | Age: 63
End: 2018-08-06
Attending: FAMILY MEDICINE
Payer: COMMERCIAL

## 2018-08-06 ENCOUNTER — OFFICE VISIT (OUTPATIENT)
Dept: FAMILY MEDICINE | Facility: OTHER | Age: 63
End: 2018-08-06
Payer: COMMERCIAL

## 2018-08-06 VITALS
BODY MASS INDEX: 31.29 KG/M2 | DIASTOLIC BLOOD PRESSURE: 70 MMHG | RESPIRATION RATE: 14 BRPM | HEART RATE: 88 BPM | SYSTOLIC BLOOD PRESSURE: 130 MMHG | WEIGHT: 204 LBS | TEMPERATURE: 98.4 F

## 2018-08-06 DIAGNOSIS — M25.561 ACUTE PAIN OF RIGHT KNEE: ICD-10-CM

## 2018-08-06 DIAGNOSIS — G89.29 CHRONIC LEFT SHOULDER PAIN: Primary | ICD-10-CM

## 2018-08-06 DIAGNOSIS — M25.512 CHRONIC LEFT SHOULDER PAIN: Primary | ICD-10-CM

## 2018-08-06 DIAGNOSIS — M25.512 CHRONIC LEFT SHOULDER PAIN: ICD-10-CM

## 2018-08-06 DIAGNOSIS — G89.29 CHRONIC LEFT SHOULDER PAIN: ICD-10-CM

## 2018-08-06 PROCEDURE — 99214 OFFICE O/P EST MOD 30 MIN: CPT | Performed by: FAMILY MEDICINE

## 2018-08-06 PROCEDURE — 73030 X-RAY EXAM OF SHOULDER: CPT | Mod: LT

## 2018-08-06 PROCEDURE — 73562 X-RAY EXAM OF KNEE 3: CPT | Mod: RT

## 2018-08-06 ASSESSMENT — PAIN SCALES - GENERAL: PAINLEVEL: MODERATE PAIN (4)

## 2018-08-06 NOTE — MR AVS SNAPSHOT
After Visit Summary   8/6/2018    Krishna Dugan    MRN: [id number]           Patient Information     Date Of Birth          1955        Visit Information        Provider Department      8/6/2018 4:20 PM Huong Tomas MD [facility]        Today's Diagnoses     Chronic left shoulder pain    -  1    Acute pain of right knee           Follow-ups after your visit        Follow-up notes from your care team     Return if symptoms worsen or fail to improve.      Your next 10 appointments already scheduled     Aug 13, 2018  5:00 PM CDT   (Arrive by 4:45 PM)   MR KNEE RIGHT W/O CONTRAST with PHMR1   [facility] ([facility] [place])    [address] [place] [id number] [phone]           Take your medicines as usual, unless your doctor tells you not to. Bring a list of your current medicines to your exam (including vitamins, minerals and over-the-counter drugs). Also bring the results of similar scans you may have had.  Please remove any body piercings and hair extensions before you arrive.  Follow your doctor s orders. If you do not, we may have to postpone your exam.  You may or may not receive IV contrast for this exam pending the discretion of the Radiologist.  You do not need to do anything special to prepare.  The MRI machine uses a strong magnet. Please wear clothes without metal (snaps, zippers). A sweatsuit works well, or we may give you a hospital gown.   **IMPORTANT** THE INSTRUCTIONS BELOW ARE ONLY FOR THOSE PATIENTS WHO HAVE BEEN PRESCRIBED SEDATION OR GENERAL ANESTHESIA DURING THEIR MRI PROCEDURE:  IF YOUR DOCTOR PRESCRIBED ORAL SEDATION (take medicine to help you relax during your exam):   You must get the medicine from your doctor (oral medication) before you arrive. Bring the medicine to the exam. Do not take it at home. You ll be told when to take it upon arriving for your exam.   Arrive one hour early. Bring someone who can take  you home after the test. Your medicine will make you sleepy. After the exam, you may not drive, take a bus or take a taxi by yourself.  IF YOUR DOCTOR PRESCRIBED IV SEDATION:   Arrive one hour early. Bring someone who can take you home after the test. Your medicine will make you sleepy. After the exam, you may not drive, take a bus or take a taxi by yourself.   No eating 6 hours before your exam. You may have clear liquids up until 4 hours before your exam. (Clear liquids include water, clear tea, black coffee and fruit juice without pulp.)  IF YOUR DOCTOR PRESCRIBED ANESTHESIA (be asleep for your exam):   Arrive 1 1/2 hours early. Bring someone who can take you home after the test. You may not drive, take a bus or take a taxi by yourself.   No eating 8 hours before your exam. You may have clear liquids up until 4 hours before your exam. (Clear liquids include water, clear tea, black coffee and fruit juice without pulp.)   You will spend four to five hours in the recovery room.  Please call the Imaging Department at your exam site with any questions.              Future tests that were ordered for you today     Open Future Orders        Priority Expected Expires Ordered    MR Knee Right w/o Contrast Routine  8/9/2019 8/9/2018            Who to contact     If you have questions or need follow up information about today's clinic visit or your schedule please contact [facility] directly at [phone].  Normal or non-critical lab and imaging results will be communicated to you by MyChart, letter or phone within 4 business days after the clinic has received the results. If you do not hear from us within 7 days, please contact the clinic through [employer] or phone. If you have a critical or abnormal lab result, we will notify you by phone as soon as possible.  Submit refill requests through [employer] or call your pharmacy and they will forward the refill request to us. Please allow 3 business days for your  refill to be completed.          Additional Information About Your Visit        Care EveryWhere ID     This is your Care EveryWhere ID. This could be used by other organizations to access your [facility] medical records  [id number]        Your Vitals Were     Pulse Temperature Respirations BMI (Body Mass Index)          88 98.4  F (36.9  C) (Oral) 14 31.29 kg/m2         Blood Pressure from Last 3 Encounters:   08/06/18 130/70   05/24/18 124/72   03/06/18 130/64    Weight from Last 3 Encounters:   08/06/18 204 lb (92.5 kg)   05/24/18 195 lb (88.5 kg)   03/06/18 204 lb 8 oz (92.8 kg)               Primary Care Provider Office Phone # Fax #    Harshil Nuñez [phone]       [facility] [address] [place] [address]        Equal Access to Services     [facility]: Hadii aad ku hadasho Soomaali, waaxda luqadaha, qaybta kaalmada adeegyada, waxay bayronin hayaan laya tadeo . So [facility] [phone].    ATENCIÓN: Si habla español, tiene a limon disposición servicios gratuitos de asistencia lingüística. [facility] al [phone].    We comply with applicable federal civil rights laws and [place] laws. We do not discriminate on the basis of race, color, national origin, age, disability, sex, sexual orientation, or gender identity.            Thank you!     Thank you for choosing [facility]  for your care. Our goal is always to provide you with excellent care. Hearing back from our patients is one way we can continue to improve our services. Please take a few minutes to complete the written survey that you may receive in the mail after your visit with us. Thank you!             Your Updated Medication List - Protect others around you: Learn how to safely use, store and throw away your medicines at www.disposemymeds.org.          This list is accurate as of 8/6/18 11:59 PM.  Always use your most recent med list.                   Brand Name Dispense Instructions for use  Diagnosis    aspirin 81 MG chewable tablet      Take 81 mg by mouth        azelastine 0.1 % spray    ASTELIN    1 Bottle    Spray 1-2 sprays into both nostrils 2 times daily    Allergic rhinitis due to dust mite, Allergic rhinitis due to animal dander       cetirizine 10 MG tablet    zyrTEC     Take 10 mg by mouth daily        CLARITIN 10 MG tablet   Generic drug:  loratadine      Take 10 mg by mouth daily    Perennial allergic rhinitis       ezetimibe 10 MG tablet    ZETIA     TK 1 T PO QD        fluticasone 50 MCG/ACT spray    FLONASE    1 Bottle    Spray 2 sprays into both nostrils daily        * lisinopril 20 MG tablet    PRINIVIL/ZESTRIL     Take 20 mg by mouth daily.        * lisinopril 10 MG tablet    PRINIVIL/ZESTRIL          * omeprazole 40 MG capsule    priLOSEC     Take by mouth daily.        * omeprazole 20 MG CR capsule    priLOSEC     Take 20 mg by mouth        VITAMIN D (CHOLECALCIFEROL) PO      Take 5,000 Units by mouth daily        * Notice:  This list has 4 medication(s) that are the same as other medications prescribed for you. Read the directions carefully, and ask your doctor or other care provider to review them with you.

## 2018-08-09 ENCOUNTER — TELEPHONE (OUTPATIENT)
Dept: FAMILY MEDICINE | Facility: OTHER | Age: 63
End: 2018-08-09

## 2018-08-09 DIAGNOSIS — M25.561 ACUTE PAIN OF RIGHT KNEE: Primary | ICD-10-CM

## 2018-08-13 ENCOUNTER — HOSPITAL ENCOUNTER (OUTPATIENT)
Dept: MRI IMAGING | Facility: CLINIC | Age: 63
Discharge: HOME | End: 2018-08-13
Attending: FAMILY MEDICINE | Admitting: FAMILY MEDICINE
Payer: COMMERCIAL

## 2018-08-13 ENCOUNTER — HOSPITAL ENCOUNTER (OUTPATIENT)
Dept: GENERAL RADIOLOGY | Facility: CLINIC | Age: 63
Discharge: HOME | End: 2018-08-13
Attending: FAMILY MEDICINE | Admitting: FAMILY MEDICINE
Payer: COMMERCIAL

## 2018-08-13 DIAGNOSIS — M25.561 ACUTE PAIN OF RIGHT KNEE: ICD-10-CM

## 2018-08-13 DIAGNOSIS — Z09 FOLLOW-UP EXAM: ICD-10-CM

## 2018-08-13 PROCEDURE — 73721 MRI JNT OF LWR EXTRE W/O DYE: CPT | Mod: RT

## 2018-08-13 PROCEDURE — 70030 X-RAY EYE FOR FOREIGN BODY: CPT | Mod: TC

## 2018-08-14 ENCOUNTER — TELEPHONE (OUTPATIENT)
Dept: FAMILY MEDICINE | Facility: OTHER | Age: 63
End: 2018-08-14

## 2018-08-14 DIAGNOSIS — S83.232A COMPLEX TEAR OF MEDIAL MENISCUS OF LEFT KNEE AS CURRENT INJURY, INITIAL ENCOUNTER: Primary | ICD-10-CM

## 2018-08-14 RX ORDER — IBUPROFEN 800 MG/1
800 TABLET, FILM COATED ORAL EVERY 8 HOURS PRN
Qty: 30 TABLET | Refills: 0 | Status: SHIPPED | OUTPATIENT
Start: 2018-08-14 | End: 2021-04-23

## 2018-08-14 NOTE — TELEPHONE ENCOUNTER
Spoke to patient and advised him of below. He is scheduled to see Ortho tomorrow at 10:30 am here in [place]

## 2018-08-14 NOTE — TELEPHONE ENCOUNTER
Please inform pt that the knee MRI results show a tear in the meniscus as suspected during the office visit. He needs a visit to the orthopedic doctor. I placed referral. Pain medications are not recommended due to concerns for causing more damage to the joint while the pain is masked. I would recommend antinflamatory medications to help with inflammation. I sent a script for high dose ibuprofen .  Please have him

## 2018-08-14 NOTE — TELEPHONE ENCOUNTER
Reason for Call:  Medication or medication refill:    Do you use a [facility]?  Name of the pharmacy and phone number for the current request:  [facility]    Name of the medication requested: pain medication    Other request: pt states had MRI yesterday of right knee ordered by norris. Pt states was told it takes a couple days to get results and pt is in pain. Pt wondering if he can get some pain medication to help ease the pain until results are in. Please advise    Can we leave a detailed message on this number? YES    Phone number patient can be reached at: Home number on file [phone] (home)    Best Time: ANY      Call taken on 8/14/2018 at 10:41 AM by Kaitlyn Acosta

## 2018-08-15 ENCOUNTER — OFFICE VISIT (OUTPATIENT)
Dept: ORTHOPEDICS | Facility: OTHER | Age: 63
End: 2018-08-15
Payer: COMMERCIAL

## 2018-08-15 VITALS
HEIGHT: 68 IN | SYSTOLIC BLOOD PRESSURE: 143 MMHG | WEIGHT: 204 LBS | DIASTOLIC BLOOD PRESSURE: 81 MMHG | BODY MASS INDEX: 30.92 KG/M2

## 2018-08-15 DIAGNOSIS — S83.231A COMPLEX TEAR OF MEDIAL MENISCUS OF RIGHT KNEE AS CURRENT INJURY, INITIAL ENCOUNTER: Primary | ICD-10-CM

## 2018-08-15 PROCEDURE — 99204 OFFICE O/P NEW MOD 45 MIN: CPT | Mod: 25 | Performed by: ORTHOPAEDIC SURGERY

## 2018-08-15 PROCEDURE — 20610 DRAIN/INJ JOINT/BURSA W/O US: CPT | Mod: RT | Performed by: ORTHOPAEDIC SURGERY

## 2018-08-15 RX ORDER — MELOXICAM 15 MG/1
15 TABLET ORAL DAILY
Qty: 60 TABLET | Refills: 1 | Status: SHIPPED | OUTPATIENT
Start: 2018-08-15 | End: 2019-08-13

## 2018-08-15 ASSESSMENT — PAIN SCALES - GENERAL: PAINLEVEL: NO PAIN (1)

## 2018-08-15 NOTE — PROGRESS NOTES
Krishna Dugan is a 63 year old male who is seen in consultation at the request of Huong Tomas MD  History of Present illness:  Krishna presents for evaluation of:  1.) rt knee  Onset: 8 weeks  Symptoms brought on by: kneeling.   Character:  hard pinching.    Progression of symptoms:  worse  Previous similar pain: YES.   Pain Level:  2/10.   Previous treatments:  nothing and Ibuprofen.  Currently on Blood thinners? No  Diagnosis of Diabetes? No

## 2018-08-15 NOTE — PATIENT INSTRUCTIONS
Encounter Diagnosis   Name Primary?     Complex tear of medial meniscus of right knee as current injury, initial encounter Yes     Rest, ice and elevate above heart level as needed for pain control  1.  You have a medial meniscus tear that we can see on your MRI.  2.  X-rays look good with no fracture dislocation or tumor.  3.  You have a very very small amount of arthritis under your kneecap.  4.  Often times these meniscus tears can get better over time.  You are not having any active symptoms right now such as catching or locking so we would like to stay conservative and consider a cortisone injection.  5. We decided to do a cortisone injection today.  This is like putting very powerful ibuprofen right to the area it is needed.  We have information about the injection below.   6. I ordered Mobic 15mg once a day times 2 weeks, then take as needed.  Stop this medication if you have any abdominal pain with it and do not take NSAIDs like Ibuprofen or Aleve while on this medication.  I sent this to your pharmacy.        7.  In the future if you were to need surgery this knee is not like your other knee you do not have much arthritis and we could possibly do a knee scope where we trim the meniscus but you may get better just with the cortisone injection so we will wait and see.  8. You can followup with Michelle Arnold MD in 6 weeks, if you are having pain at that time we can do a cortisone injection.  You can always cancel the appointment if you are doing really well and follow-up as needed.  Cortisone Instructions:     1. You received an injection of cortisone into your right knee today.  2. The joint(s) may be more painful for the first 1-2 days.  3. We ask you to continue to rest the joint(s) for a few more days before resuming regular activities.  4. Pain Medications you can take (as long as your primary care provider allows these meds and you do not have kidney or liver conditions):  Tylenol  Take 1000 mg by  mouth every 6 hours as needed; maximum dose 4000 mg a day  Ibuprofen  600 mg every 6 hours as needed; maximum 2400 mg a day  (OK to take tylenol and ibuprofen at the same time)  5. Rest, ice and elevate as needed for pain control  6. Watch for these signs of infection: redness, swelling, drainage, warmth to touch, increased pain, or fever. Call the clinic or make an appointment to be seen if you think you have an infection.  7. If you are diabetic, make sure you keep a close eye on your blood sugars, they can get elevated with cortisone injections.   8. Sometimes it can take 1-2 weeks for it to reach its full effect.    Cortisone Injections  Cortisone is a type of steroid. It can greatly reduce swelling, redness, and irritation (inflammation) and pain. Being injected with cortisone is simple and doesn t take long. Your doctor may ask you questions about your health. Certain health conditions, such as diabetes, can be affected by cortisone.     Your pain may be relieved by a cortisone injection.   Why have a cortisone injection?  Injecting cortisone can relieve pain for anything from a sports injury to arthritis. Your doctor may suggest an injection if rest, splints, or oral medicine doesn t relieve your pain. Injecting cortisone is simpler than having surgery. And cortisone may provide the lasting pain relief that can help you get out and enjoy life again.  Getting the injection  Your doctor will start by cleaning and occasionally numbing your skin at the injection site. Next you ll be injected with local anesthetics (for short-term pain relief) and cortisone. The injection may last a few moments. A small bandage will be put over the injection site. You ll then be ready to go home.  After your injection  After being injected, make sure you don t injure the treated region. But stay active. Enjoy a walk or some other mild activity. Just be careful not to strain the region that gave you trouble.  The next day  Some  people feel more pain after being injected. This is normal, and it will go away soon. Applying ice for 20 minutes at a time to your injury may reduce the increased pain. Rest for the first day or two. You don t need to stay in bed. But avoid tasks that may strain the injured region.  If you have diabetes  Cortisone injections can cause blood sugar to be increased for several days after the injection. If you have diabetes, you should follow your blood  sugar closely during this time. Follow your regular plan for what to do when your blood sugar is elevated.     6345-5135 [employer]. [address] [place] [id number]. All rights reserved. This information is not intended as a substitute for professional medical care. Always follow your healthcare professional's instructions.     [employer] and [employer] may offer reliable information regarding your diagnosis and treatment plan.    THANK YOU for coming in today. If you receive a survey via [employer] or mail please let us know if there was anything you especially appreciated today or if there is any way we can improve our clinic. We appreciate your input.    GENERAL INFORMATION:  Our hours are:  Monday :     Clinic 7:30 AM-430 PM ([facility])  Tuesday:      Operating Room All Day ([facility])  Wednesday: Clinic 7:30 AM - 11:15 AM ([facility])             Clinic 1:00 PM - 4:00PM ([facility])  Thursday:     Administrative Day  Friday:          Clinic 7:30 AM - 11:15 AM ([facility])            Clinic 1:00 PM - 4:00 PM ([facility])      [facility] for any issues or concerns: [phone]      We are not in the office Thursdays. Therefore non- urgent calls and medical messages received on Thursday will be addressed when we are back in the office on Wednesday. Urgent matters will be reviewed and addressed by one of our partners in the  office as needed.    If lab work was done today as part of your evaluation you will generally be contacted via [employer] [facility], mail, or phone with the results within 1-5 days. If there is an alarming result we will contact you by phone. Lab results come back at varying times, I generally wait until all labs are resulted before making comments on results. Please note labs are automatically released to S B [facility] (if you have signed up for it) once available-at times you may see these prior to my having a chance to review them as well.    If you need refills please contact your pharmacist. They will send a refill request to me to review. Please allow 3 business days for us to process all refill requests. All narcotic refills should be handled in the clinic at the time of your visit.

## 2018-08-15 NOTE — PROGRESS NOTES
ORTHOPEDIC CONSULT      Chief Complaint: Krishna Dugan is a 63 year old male who works as a  and enjoys fishing.      He is being seen for   Chief Complaints and History of Present Illnesses   Patient presents with     Consult     rt knee per Huong Tomas MD         History of Present Illness:   Mechanism of Injury: Patient was going into a kneeling position onto some sand and he states that he felt a tear in the knee.  Location: Right medial knee joint line  Duration of Pain: For approximately 6-8 weeks.  Patient was unsure of the actual date of injury  Rating of Pain: 1 out of 10 currently  Pain Quality: Sharp and achy  Pain is better with: Rest and 200 mg of ibuprofen 3 times a day.  Pain is worse with: Kneeling or deep bending.  Treatment so far consists of: Ibuprofen.  No injections no formal therapy no exercises and no Tylenol have been tried..   Associated Features: Patient denies any catching or locking or popping or swelling or bruising.  Patient just has medial side knee pain.  Prior history of related problems: No previous surgery injury trauma to the right knee  Pain is Limiting: Ambulation  Here to: Orthopedic consultation  The Pain Has: Getting worse  Additional History: None    Patient's past medical, surgical, social and family histories reviewed.     Past Medical History:   Diagnosis Date     Diagnostic skin and sensitization tests 9/08 skin tests pos. for cat/dog ONLY (all other environmental allergens NEGATIVE)         No past surgical history on file.    Medications:    Current Outpatient Prescriptions on File Prior to Visit:  aspirin 81 MG chewable tablet Take 81 mg by mouth   azelastine (ASTELIN) 0.1 % spray Spray 1-2 sprays into both nostrils 2 times daily   cetirizine (ZYRTEC) 10 MG tablet Take 10 mg by mouth daily   ezetimibe (ZETIA) 10 MG tablet TK 1 T PO QD   fluticasone (FLONASE) 50 MCG/ACT spray Spray 2 sprays into both nostrils daily   ibuprofen (ADVIL/MOTRIN) 800 MG  "tablet Take 1 tablet (800 mg) by mouth every 8 hours as needed for moderate pain   lisinopril (PRINIVIL,ZESTRIL) 20 MG tablet Take 20 mg by mouth daily.    lisinopril (PRINIVIL/ZESTRIL) 10 MG tablet    loratadine (CLARITIN) 10 MG tablet Take 10 mg by mouth daily   omeprazole (PRILOSEC) 20 MG CR capsule Take 20 mg by mouth   omeprazole (PRILOSEC) 40 MG capsule Take by mouth daily.    VITAMIN D, CHOLECALCIFEROL, PO Take 5,000 Units by mouth daily     No current facility-administered medications on file prior to visit.     Allergies   Allergen Reactions     Valdecoxib      Ears turn purple  Facial flushing  Bextra       Social History     Occupational History     Not on file.     Social History Main Topics     Smoking status: Former Smoker     Packs/day: 0.50     Years: 10.00     Types: Cigarettes     Smokeless tobacco: Never Used     Alcohol use No      Comment: very little     Drug use: No     Sexual activity: Not Currently     Partners: Female     no pertinent family history    REVIEW OF SYSTEMS  10 point review systems performed otherwise negative as noted as per history of present illness.    Physical Exam:  Vitals: /81  Ht 1.72 m (5' 7.7\")  Wt 92.5 kg (204 lb)  BMI 31.29 kg/m2  BMI= Body mass index is 31.29 kg/(m^2).    Constitutional: healthy, alert and no acute distress   Psychiatric: mentation appears normal and affect normal/bright  NEURO: no focal deficits, CMS intact right lower extremity  RESP: Normal with easy respirations and no use of accessory muscles to breathe, no audible wheezing or retractions  CV: Calf soft and nontender to palpation, leg warm   SKIN: No erythema, rashes, excoriation, or breakdown. No evidence of infection.   MUSCULOSKELETAL:    INSPECTION of right knee: No gross deformities, erythema, edema, ecchymosis, atrophy or fasciculations.     PALPATION: Very specific right medial joint line tenderness.  No tenderness on palpation of the medial, lateral, anterior and posterior " portion of the knee. No specific joint line lateral tenderness. No increased warmth.  No effusion.     ROM: Extension full, flexion to approximately 125 . All range of motion without catching, locking or pain.       STRENGTH: 5 out of 5 quad and hamstring strength.     SPECIAL TEST: Patient has a negative Lachman's negative drawer sign. Patient's knee is stable to varus and valgus stress at 30  of flexion. Patient has a negative Sonja's.   GAIT: non-antalgic  Lymph: no palpable lymph nodes    Diagnostic Modalities:  XR Knee Right 3 Views    Narrative    KNEE RIGHT THREE VIEWS August 6, 2018 5:27 PM     HISTORY: Acute pain of right knee.    COMPARISON: None.      Impression    IMPRESSION: Very mild patellofemoral degenerative changes. Medial and  lateral compartment joint spaces are well-preserved. No acute fracture  or subluxation. No lytic or destructive lesions.    SAHRA DUMONT MD                      MR Knee Right w/o Contrast    Narrative    MR KNEE RIGHT WITHOUT CONTRAST August 13, 2018 6:25 PM    HISTORY: Six weeks of right knee pain.    COMPARISON: Radiographs on 8/6/2018.    TECHNIQUE: Multiplanar MR imaging was performed without contrast.    FINDINGS:     Medial Meniscus: There is a large complex tear of the posterior horn.  This involves the inferior articular surface posteriorly and both the  superior and inferior articular surfaces near the free edge near the  body. No displaced meniscal fragment is seen.    Lateral Meniscus: No tear, displaced fragment, or extrusion.    Anterior Cruciate Ligament: Unremarkable.    Posterior Cruciate Ligament: Unremarkable.    Medial Collateral Ligament: Unremarkable.    Lateral Collateral Ligament Complex, Popliteus Tendon: The iliotibial  band, fibular collateral ligament, biceps femoris tendon, and  popliteus tendon are unremarkable.    Osseous and Cartilaginous Structures: No fracture or osseous lesion is  demonstrated. There is a just over 1 cm diameter area of  prominent  grade 2 chondromalacia along the median patellar ridge with mild  underlying marrow edema. There is also mild grade 2 chondromalacia  along the adjacent femoral trochlea. The cartilage of the medial and  lateral compartments is well preserved.    Extensor Mechanism: The quadriceps and patellar tendons are  unremarkable. The medial and lateral patellar retinacula appear  unremarkable.    Joint Space: No joint effusion. No definite loose bodies appreciated.    Additional Findings: There is a small amount of fluid between the  medial gastrocnemius and semimembranosus tendon. This is not contained  in a distinct cyst and likely represents a ruptured Baker's cyst. No  semimembranosus-tibial collateral ligament or pes anserine bursitis.  No adjacent soft tissue pathology is seen.      Impression    IMPRESSION:   1. Large complex tear of the posterior horn of the medial meniscus.  2. Mild chondromalacia of the patellofemoral joint.  3. Small ruptured Diaz's cyst.    RACHAEL CERVANTES MD     We agree with the above readings.  Independent visualization of the images was performed.    Impression: 1.  Right knee medial meniscus tear.  2.  Right knee very mild patellofemoral degenerative joint disease    Plan:  All of the above pertinent physical exam and imaging modalities findings was reviewed with Krishna.                                          INJECTION PROCEDURE:  The patient was counseled about an  injection, including discussion of risks (including infection), contents of the injection, rationale for performing the injection, and expected benefits of the injection. The skin was prepped with alcohol and betadine and then utilizing sterile technique an injection of the right knee joint from the anterolateral approach in the seated position was performed. I used michael chloride spray prior to doing the injection. The injection consisted 1ml of Kenalog (40mg per 1ml) with 8ml 1% lidocaine plain. The patient  tolerated the injection well, and there were no complications. The injection site was covered with a Band-Aid. The injection was performed by Galo Luz PA-C     Patient Instructions:  1.  You have a medial meniscus tear that we can see on your MRI.  2.  X-rays look good with no fracture dislocation or tumor.  3.  You have a very very small amount of arthritis under your kneecap.  4.  Often times these meniscus tears can get better over time.  You are not having any active symptoms right now such as catching or locking so we would like to stay conservative and consider a cortisone injection.  5. We decided to do a cortisone injection today.  This is like putting very powerful ibuprofen right to the area it is needed.  We have information about the injection below.   6. I ordered Mobic 15mg once a day times 2 weeks, then take as needed.  Stop this medication if you have any abdominal pain with it and do not take NSAIDs like Ibuprofen or Aleve while on this medication.  I sent this to your pharmacy.        7.  In the future if you were to need surgery this knee is not like your other knee you do not have much arthritis and we could possibly do a knee scope where we trim the meniscus but you may get better just with the cortisone injection so we will wait and see.  8. You can followup with Michelle Arnold MD in 6 weeks, if you are having pain at that time we can do a cortisone injection.  You can always cancel the appointment if you are doing really well and follow-up as needed.  Re-x-ray on return: No    BP Readings from Last 1 Encounters:   08/15/18 143/81       BP noted to be elevated today in office.  Patient to follow up with Primary Care provider regarding elevated blood pressure.     Patient does not use Tobacco products.    Scribed by Sanchez Luz PA-C on 8/15/2018 at 11:24 AM, based on Dr. Michelle Arnold's statements to me.    This note was dictated with [id number].    Sanchez Luz PA-C        Michelle Arnold MD

## 2018-08-15 NOTE — LETTER
8/15/2018         RE: Krishna Dugan  [address] [facility] [address]        Dear Colleague,    Thank you for referring your patient, Krishna Dugan, to the [facility]. Please see a copy of my visit note below.    Krishna Dugan is a 63 year old male who is seen in consultation at the request of Huong Tomas MD  History of Present illness:  Krishna presents for evaluation of:  1.) rt knee  Onset: 8 weeks  Symptoms brought on by: kneeling.   Character:  hard pinching.    Progression of symptoms:  worse  Previous similar pain: YES.   Pain Level:  2/10.   Previous treatments:  nothing and Ibuprofen.  Currently on Blood thinners? No  Diagnosis of Diabetes? No            ORTHOPEDIC CONSULT      Chief Complaint: Krishna Dugan is a 63 year old male who works as a  and enjoys fishing.      He is being seen for   Chief Complaints and History of Present Illnesses   Patient presents with     Consult     rt knee per Huong Tomas MD         History of Present Illness:   Mechanism of Injury: Patient was going into a kneeling position onto some sand and he states that he felt a tear in the knee.  Location: Right medial knee joint line  Duration of Pain: For approximately 6-8 weeks.  Patient was unsure of the actual date of injury  Rating of Pain: 1 out of 10 currently  Pain Quality: Sharp and achy  Pain is better with: Rest and 200 mg of ibuprofen 3 times a day.  Pain is worse with: Kneeling or deep bending.  Treatment so far consists of: Ibuprofen.  No injections no formal therapy no exercises and no Tylenol have been tried..   Associated Features: Patient denies any catching or locking or popping or swelling or bruising.  Patient just has medial side knee pain.  Prior history of related problems: No previous surgery injury trauma to the right knee  Pain is Limiting: Ambulation  Here to: Orthopedic consultation  The Pain Has: Getting worse  Additional History:  "None    Patient's past medical, surgical, social and family histories reviewed.     Past Medical History:   Diagnosis Date     Diagnostic skin and sensitization tests 9/08 skin tests pos. for cat/dog ONLY (all other environmental allergens NEGATIVE)         No past surgical history on file.    Medications:    Current Outpatient Prescriptions on File Prior to Visit:  aspirin 81 MG chewable tablet Take 81 mg by mouth   azelastine (ASTELIN) 0.1 % spray Spray 1-2 sprays into both nostrils 2 times daily   cetirizine (ZYRTEC) 10 MG tablet Take 10 mg by mouth daily   ezetimibe (ZETIA) 10 MG tablet TK 1 T PO QD   fluticasone (FLONASE) 50 MCG/ACT spray Spray 2 sprays into both nostrils daily   ibuprofen (ADVIL/MOTRIN) 800 MG tablet Take 1 tablet (800 mg) by mouth every 8 hours as needed for moderate pain   lisinopril (PRINIVIL,ZESTRIL) 20 MG tablet Take 20 mg by mouth daily.    lisinopril (PRINIVIL/ZESTRIL) 10 MG tablet    loratadine (CLARITIN) 10 MG tablet Take 10 mg by mouth daily   omeprazole (PRILOSEC) 20 MG CR capsule Take 20 mg by mouth   omeprazole (PRILOSEC) 40 MG capsule Take by mouth daily.    VITAMIN D, CHOLECALCIFEROL, PO Take 5,000 Units by mouth daily     No current facility-administered medications on file prior to visit.     Allergies   Allergen Reactions     Valdecoxib      Ears turn purple  Facial flushing  Bextra       Social History     Occupational History     Not on file.     Social History Main Topics     Smoking status: Former Smoker     Packs/day: 0.50     Years: 10.00     Types: Cigarettes     Smokeless tobacco: Never Used     Alcohol use No      Comment: very little     Drug use: No     Sexual activity: Not Currently     Partners: Female     no pertinent family history    REVIEW OF SYSTEMS  10 point review systems performed otherwise negative as noted as per history of present illness.    Physical Exam:  Vitals: /81  Ht 1.72 m (5' 7.7\")  Wt 92.5 kg (204 lb)  BMI 31.29 kg/m2  BMI= Body mass " index is 31.29 kg/(m^2).    Constitutional: healthy, alert and no acute distress   Psychiatric: mentation appears normal and affect normal/bright  NEURO: no focal deficits, CMS intact right lower extremity  RESP: Normal with easy respirations and no use of accessory muscles to breathe, no audible wheezing or retractions  CV: Calf soft and nontender to palpation, leg warm   SKIN: No erythema, rashes, excoriation, or breakdown. No evidence of infection.   MUSCULOSKELETAL:    INSPECTION of right knee: No gross deformities, erythema, edema, ecchymosis, atrophy or fasciculations.     PALPATION: Very specific right medial joint line tenderness.  No tenderness on palpation of the medial, lateral, anterior and posterior portion of the knee. No specific joint line lateral tenderness. No increased warmth.  No effusion.     ROM: Extension full, flexion to approximately 125 . All range of motion without catching, locking or pain.       STRENGTH: 5 out of 5 quad and hamstring strength.     SPECIAL TEST: Patient has a negative Lachman's negative drawer sign. Patient's knee is stable to varus and valgus stress at 30  of flexion. Patient has a negative Sonja's.   GAIT: non-antalgic  Lymph: no palpable lymph nodes    Diagnostic Modalities:  XR Knee Right 3 Views    Narrative    KNEE RIGHT THREE VIEWS August 6, 2018 5:27 PM     HISTORY: Acute pain of right knee.    COMPARISON: None.      Impression    IMPRESSION: Very mild patellofemoral degenerative changes. Medial and  lateral compartment joint spaces are well-preserved. No acute fracture  or subluxation. No lytic or destructive lesions.    SAHRA DUMONT MD                      MR Knee Right w/o Contrast    Narrative    MR KNEE RIGHT WITHOUT CONTRAST August 13, 2018 6:25 PM    HISTORY: Six weeks of right knee pain.    COMPARISON: Radiographs on 8/6/2018.    TECHNIQUE: Multiplanar MR imaging was performed without contrast.    FINDINGS:     Medial Meniscus: There is a large complex  tear of the posterior horn.  This involves the inferior articular surface posteriorly and both the  superior and inferior articular surfaces near the free edge near the  body. No displaced meniscal fragment is seen.    Lateral Meniscus: No tear, displaced fragment, or extrusion.    Anterior Cruciate Ligament: Unremarkable.    Posterior Cruciate Ligament: Unremarkable.    Medial Collateral Ligament: Unremarkable.    Lateral Collateral Ligament Complex, Popliteus Tendon: The iliotibial  band, fibular collateral ligament, biceps femoris tendon, and  popliteus tendon are unremarkable.    Osseous and Cartilaginous Structures: No fracture or osseous lesion is  demonstrated. There is a just over 1 cm diameter area of prominent  grade 2 chondromalacia along the median patellar ridge with mild  underlying marrow edema. There is also mild grade 2 chondromalacia  along the adjacent femoral trochlea. The cartilage of the medial and  lateral compartments is well preserved.    Extensor Mechanism: The quadriceps and patellar tendons are  unremarkable. The medial and lateral patellar retinacula appear  unremarkable.    Joint Space: No joint effusion. No definite loose bodies appreciated.    Additional Findings: There is a small amount of fluid between the  medial gastrocnemius and semimembranosus tendon. This is not contained  in a distinct cyst and likely represents a ruptured Baker's cyst. No  semimembranosus-tibial collateral ligament or pes anserine bursitis.  No adjacent soft tissue pathology is seen.      Impression    IMPRESSION:   1. Large complex tear of the posterior horn of the medial meniscus.  2. Mild chondromalacia of the patellofemoral joint.  3. Small ruptured Diaz's cyst.    RACHAEL CERVANTES MD     We agree with the above readings.  Independent visualization of the images was performed.    Impression: 1.  Right knee medial meniscus tear.  2.  Right knee very mild patellofemoral degenerative joint  disease    Plan:  All of the above pertinent physical exam and imaging modalities findings was reviewed with Krishna.                                          INJECTION PROCEDURE:  The patient was counseled about an  injection, including discussion of risks (including infection), contents of the injection, rationale for performing the injection, and expected benefits of the injection. The skin was prepped with alcohol and betadine and then utilizing sterile technique an injection of the right knee joint from the anterolateral approach in the seated position was performed. I used michael chloride spray prior to doing the injection. The injection consisted 1ml of Kenalog (40mg per 1ml) with 8ml 1% lidocaine plain. The patient tolerated the injection well, and there were no complications. The injection site was covered with a Band-Aid. The injection was performed by Galo Luz PA-C     Patient Instructions:  1.  You have a medial meniscus tear that we can see on your MRI.  2.  X-rays look good with no fracture dislocation or tumor.  3.  You have a very very small amount of arthritis under your kneecap.  4.  Often times these meniscus tears can get better over time.  You are not having any active symptoms right now such as catching or locking so we would like to stay conservative and consider a cortisone injection.  5. We decided to do a cortisone injection today.  This is like putting very powerful ibuprofen right to the area it is needed.  We have information about the injection below.   6. I ordered Mobic 15mg once a day times 2 weeks, then take as needed.  Stop this medication if you have any abdominal pain with it and do not take NSAIDs like Ibuprofen or Aleve while on this medication.  I sent this to your pharmacy.        7.  In the future if you were to need surgery this knee is not like your other knee you do not have much arthritis and we could possibly do a knee scope where we trim the meniscus but you may get  better just with the cortisone injection so we will wait and see.  8. You can followup with Michelle Arnold MD in 6 weeks, if you are having pain at that time we can do a cortisone injection.  You can always cancel the appointment if you are doing really well and follow-up as needed.  Re-x-ray on return: No    BP Readings from Last 1 Encounters:   08/15/18 143/81       BP noted to be elevated today in office.  Patient to follow up with Primary Care provider regarding elevated blood pressure.     Patient does not use Tobacco products.    Scribed by Sanchez Luz PA-C on 8/15/2018 at 11:24 AM, based on Dr. Michelle Arnold's statements to me.    This note was dictated with [id number].    DANTE Abbasi MD      Again, thank you for allowing me to participate in the care of your patient.        Sincerely,        Michelle Arnold MD

## 2018-08-15 NOTE — MR AVS SNAPSHOT
After Visit Summary   8/15/2018    Krishna Dugan    MRN: [id number]           Patient Information     Date Of Birth          1955        Visit Information        Provider Department      8/15/2018 10:30 AM Michelle Arnold MD [facility]        Today's Diagnoses     Complex tear of medial meniscus of right knee as current injury, initial encounter    -  1      Care Instructions    Encounter Diagnosis   Name Primary?     Complex tear of medial meniscus of right knee as current injury, initial encounter Yes     Rest, ice and elevate above heart level as needed for pain control  1.  You have a medial meniscus tear that we can see on your MRI.  2.  X-rays look good with no fracture dislocation or tumor.  3.  You have a very very small amount of arthritis under your kneecap.  4.  Often times these meniscus tears can get better over time.  You are not having any active symptoms right now such as catching or locking so we would like to stay conservative and consider a cortisone injection.  5. We decided to do a cortisone injection today.  This is like putting very powerful ibuprofen right to the area it is needed.  We have information about the injection below.   6. I ordered Mobic 15mg once a day times 2 weeks, then take as needed.  Stop this medication if you have any abdominal pain with it and do not take NSAIDs like Ibuprofen or Aleve while on this medication.  I sent this to your pharmacy.        7.  In the future if you were to need surgery this knee is not like your other knee you do not have much arthritis and we could possibly do a knee scope where we trim the meniscus but you may get better just with the cortisone injection so we will wait and see.  8. You can followup with Michelle Arnold MD in 6 weeks, if you are having pain at that time we can do a cortisone injection.  You can always cancel the appointment if you are doing really well and follow-up as  needed.  Cortisone Instructions:     1. You received an injection of cortisone into your right knee today.  2. The joint(s) may be more painful for the first 1-2 days.  3. We ask you to continue to rest the joint(s) for a few more days before resuming regular activities.  4. Pain Medications you can take (as long as your primary care provider allows these meds and you do not have kidney or liver conditions):  Tylenol  Take 1000 mg by mouth every 6 hours as needed; maximum dose 4000 mg a day  Ibuprofen  600 mg every 6 hours as needed; maximum 2400 mg a day  (OK to take tylenol and ibuprofen at the same time)  5. Rest, ice and elevate as needed for pain control  6. Watch for these signs of infection: redness, swelling, drainage, warmth to touch, increased pain, or fever. Call the clinic or make an appointment to be seen if you think you have an infection.  7. If you are diabetic, make sure you keep a close eye on your blood sugars, they can get elevated with cortisone injections.   8. Sometimes it can take 1-2 weeks for it to reach its full effect.    Cortisone Injections  Cortisone is a type of steroid. It can greatly reduce swelling, redness, and irritation (inflammation) and pain. Being injected with cortisone is simple and doesn t take long. Your doctor may ask you questions about your health. Certain health conditions, such as diabetes, can be affected by cortisone.     Your pain may be relieved by a cortisone injection.   Why have a cortisone injection?  Injecting cortisone can relieve pain for anything from a sports injury to arthritis. Your doctor may suggest an injection if rest, splints, or oral medicine doesn t relieve your pain. Injecting cortisone is simpler than having surgery. And cortisone may provide the lasting pain relief that can help you get out and enjoy life again.  Getting the injection  Your doctor will start by cleaning and occasionally numbing your skin at the injection site. Next you ll be  injected with local anesthetics (for short-term pain relief) and cortisone. The injection may last a few moments. A small bandage will be put over the injection site. You ll then be ready to go home.  After your injection  After being injected, make sure you don t injure the treated region. But stay active. Enjoy a walk or some other mild activity. Just be careful not to strain the region that gave you trouble.  The next day  Some people feel more pain after being injected. This is normal, and it will go away soon. Applying ice for 20 minutes at a time to your injury may reduce the increased pain. Rest for the first day or two. You don t need to stay in bed. But avoid tasks that may strain the injured region.  If you have diabetes  Cortisone injections can cause blood sugar to be increased for several days after the injection. If you have diabetes, you should follow your blood  sugar closely during this time. Follow your regular plan for what to do when your blood sugar is elevated.     9422-4993 [employer] [address] [place] [address]. All rights reserved. This information is not intended as a substitute for professional medical care. Always follow your healthcare professional's instructions.     [employer] and [employer] may offer reliable information regarding your diagnosis and treatment plan.    THANK YOU for coming in today. If you receive a survey via [employer] or mail please let us know if there was anything you especially appreciated today or if there is any way we can improve our clinic. We appreciate your input.    GENERAL INFORMATION:  Our hours are:  Monday :     Clinic 7:30 AM-430 PM ([facility])  Tuesday:      Operating Room All Day ([facility])  Wednesday: Clinic 7:30 AM - 11:15 AM ([facility])             Clinic 1:00 PM - 4:00PM ([facility])  Thursday:     Administrative Day  Friday:          Clinic 7:30 AM  - 11:15 AM ([facility])            Clinic 1:00 PM - 4:00 PM ([facility])      [facility] for any issues or concerns: [phone]      We are not in the office Thursdays. Therefore non- urgent calls and medical messages received on Thursday will be addressed when we are back in the office on Wednesday. Urgent matters will be reviewed and addressed by one of our partners in the office as needed.    If lab work was done today as part of your evaluation you will generally be contacted via [employer], mail, or phone with the results within 1-5 days. If there is an alarming result we will contact you by phone. Lab results come back at varying times, I generally wait until all labs are resulted before making comments on results. Please note labs are automatically released to [employer] (if you have signed up for it) once available-at times you may see these prior to my having a chance to review them as well.    If you need refills please contact your pharmacist. They will send a refill request to me to review. Please allow 3 business days for us to process all refill requests. All narcotic refills should be handled in the clinic at the time of your visit.           Follow-ups after your visit        Who to contact     If you have questions or need follow up information about today's clinic visit or your schedule please contact [facility] directly at [phone].  Normal or non-critical lab and imaging results will be communicated to you by [employer], letter or phone within 4 business days after the clinic has received the results. If you do not hear from us within 7 days, please contact the clinic through [employer] or phone. If you have a critical or abnormal lab result, we will notify you by phone as soon as possible.  Submit refill requests through [employer] or call your pharmacy and they will forward the refill request to us. Please allow 3 business days for  "your refill to be completed.          Additional Information About Your Visit        Care EveryWhere ID     This is your Care EveryWhere ID. This could be used by other organizations to access your [facility] medical records  [id number]        Your Vitals Were     Height BMI (Body Mass Index)                1.72 m (5' 7.7\") 31.29 kg/m2           Blood Pressure from Last 3 Encounters:   08/15/18 143/81   08/06/18 130/70   05/24/18 124/72    Weight from Last 3 Encounters:   08/15/18 92.5 kg (204 lb)   08/06/18 92.5 kg (204 lb)   05/24/18 88.5 kg (195 lb)              Today, you had the following     No orders found for display         Today's Medication Changes          These changes are accurate as of 8/15/18 11:16 AM.  If you have any questions, ask your nurse or doctor.               Start taking these medicines.        Dose/Directions    meloxicam 15 MG tablet   Commonly known as:  MOBIC   Used for:  Complex tear of medial meniscus of right knee as current injury, initial encounter   Started by:  Michelle Arnold MD        Dose:  15 mg   Take 1 tablet (15 mg) by mouth daily   Quantity:  60 tablet   Refills:  1            Where to get your medicines      [facility] [address] [place] [address] [facility] [address]  [address] [facility] [address]     Phone:  [phone]     meloxicam 15 MG tablet                Primary Care Provider Office Phone # Fax #    Harshil Nuñez [phone]       [facility] [address] [place] [address]        Equal Access to Services     [facility] Hospital[facility] AH: Hadii chandler ku hadasho Soomaali, waaxda luqadaha, qaybta kaalmada adephilyajarred, radha vides. So [facility] [phone].    ATENCIÓN: Si habla español, tiene a limon disposición servicios gratuitos de asistencia lingüística. Lasha gilmore [phone].    We comply with applicable federal civil rights laws and [place] " laws. We do not discriminate on the basis of race, color, national origin, age, disability, sex, sexual orientation, or gender identity.            Thank you!     Thank you for choosing [facility]  for your care. Our goal is always to provide you with excellent care. Hearing back from our patients is one way we can continue to improve our services. Please take a few minutes to complete the written survey that you may receive in the mail after your visit with us. Thank you!             Your Updated Medication List - Protect others around you: Learn how to safely use, store and throw away your medicines at www.disposemymeds.org.          This list is accurate as of 8/15/18 11:16 AM.  Always use your most recent med list.                   Brand Name Dispense Instructions for use Diagnosis    aspirin 81 MG chewable tablet      Take 81 mg by mouth        azelastine 0.1 % nasal spray    ASTELIN    1 Bottle    Spray 1-2 sprays into both nostrils 2 times daily    Allergic rhinitis due to dust mite, Allergic rhinitis due to animal dander       cetirizine 10 MG tablet    zyrTEC     Take 10 mg by mouth daily        CLARITIN 10 MG tablet   Generic drug:  loratadine      Take 10 mg by mouth daily    Perennial allergic rhinitis       ezetimibe 10 MG tablet    ZETIA     TK 1 T PO QD        fluticasone 50 MCG/ACT spray    FLONASE    1 Bottle    Spray 2 sprays into both nostrils daily        ibuprofen 800 MG tablet    ADVIL/MOTRIN    30 tablet    Take 1 tablet (800 mg) by mouth every 8 hours as needed for moderate pain    Complex tear of medial meniscus of left knee as current injury, initial encounter       * lisinopril 20 MG tablet    PRINIVIL/ZESTRIL     Take 20 mg by mouth daily.        * lisinopril 10 MG tablet    PRINIVIL/ZESTRIL          meloxicam 15 MG tablet    MOBIC    60 tablet    Take 1 tablet (15 mg) by mouth daily    Complex tear of medial meniscus of right knee as current injury, initial encounter        * omeprazole 40 MG capsule    priLOSEC     Take by mouth daily.        * omeprazole 20 MG CR capsule    priLOSEC     Take 20 mg by mouth        VITAMIN D (CHOLECALCIFEROL) PO      Take 5,000 Units by mouth daily        * Notice:  This list has 4 medication(s) that are the same as other medications prescribed for you. Read the directions carefully, and ask your doctor or other care provider to review them with you.

## 2018-11-19 NOTE — PROGRESS NOTES
SUBJECTIVE:   Krishna Dugan is a 63 year old male who presents to clinic today for the following health issues:    Toe nail also started turning black about 2 months ago, no pain  - Figured stubbed against something   - Slowly growing, no issues with it, doesn't hurt   - Wife was worried about diabetes       HPI  Joint Pain    Onset:  6 weeks ago    Description:   Location: right pointer finger  Character: Sharp if its bumped    Intensity: moderate    Progression of Symptoms: same: not getting better    Accompanying Signs & Symptoms:  Other symptoms: none    History:   Previous similar pain: no       Precipitating factors:   Trauma or overuse: YES- started with a laceration and healed but keeps coming back when unwrapped.     Alleviating factors:  Improved by: being wrapped   Therapies Tried and outcome: antiseptic didn't help much    - Tender when unwraps, but if keeps wrapped up feels better   - Originally a cut, didn't get any better         Problem list and histories reviewed & adjusted, as indicated.  Additional history: as documented    ROS:  Constitutional, HEENT, cardiovascular, pulmonary, gi and gu systems are negative, except as otherwise noted.    OBJECTIVE:   /70  Pulse 90  Temp 97.4  F (36.3  C) (Temporal)  Resp 16  Wt 203 lb (92.1 kg)  SpO2 98%  BMI 31.14 kg/m2  Body mass index is 31.14 kg/(m^2).  GENERAL APPEARANCE: healthy, alert and no distress  EYES: Eyes grossly normal to inspection, PERRLA, conjunctivae and sclerae without injection or discharge, EOM intact    MS: No musculoskeletal defects are noted and gait is age appropriate without ataxia        Right 2nd digit - Small cystic structure at PIP joint dorsally, mildly tender, not warm, fluctuance felt, normal ROM and sensation of entire digit          Remainder of hand normal         Left foot 5th digit - nail blackened at nail bed and 1/3 way up, normal ROM and sensation, remainder of foot normal   SKIN: No suspicious lesions  or rashes, hydration status appears adeuqate with normal skin turgor   PSYCH: Alert and oriented x3; speech- coherent , normal rate and volume; able to articulate logical thoughts, able to abstract reason, no tangential thoughts, no hallucinations or delusions, mentation appears normal, Mood is euthymic. Affect is appropriate for this mood state and bright. Thought content is free of suicidal ideation, hallucinations, and delusions. Dress is adequate and upkept. Eye contact is good during conversation.       Diagnostic Test Results:  none       Procedure Note:  Pause for the cause has been completed prior to the prceedure.   1. Patient was identified by both name and date of birth   2. The correct site was identified   3. Site was marked by provider    4. Verbal authorization  to proceed was obtained   5. Verifed necessary supplies, equipment, and diagnostics are available    6. Time out was performed immediately prior to procedure    Objective: The lesion(s) is/are of the above mentioned size and location and is/are typical. The area was prepped using alcohol swabs. Using the usual technique, an aspiration was performed using sterile 25 g and 3 mm syringe. Clear fluid was aspirated along with scant blood. Hemostasis was obtained using pressure and bandage. An appropriate dressing was applied. The procedure was well tolerated and without complications.         ASSESSMENT/PLAN:       ICD-10-CM    1. Ganglion cyst M67.40    2. Injury of toe on left foot, initial encounter S99.922A    3. Need for prophylactic vaccination and inoculation against influenza Z23 FLU VACCINE, SPLIT VIRUS, IM (QUADRIVALENT) [12604]- >3 YRS     Vaccine Administration, Initial [23714]   4. Need for prophylactic vaccination with tetanus-diphtheria (Td) Z23 TDAP VACCINE (ADACEL)     1. Cyst   - As above, question lingering infection due to tenderness vs. Ganglion cyst   - Aspiration as above, showed clear fluid consistent with ganglion cyst  -  Discussed etiology and treatment (excision) vs. Monitoring   - Since very small and high change of recurrence, recommend monitoring   - Patient in agreement   - Hand out given     2. Toe   - Black nail from dried blood underneath likely after injury   - Discussed likely will lose nail  - No concerns for infection or anything else   - Monitor     3 & 4. Shots given today    The patient indicates understanding of these issues and agrees with the plan.    Follow up: NAHUN Yeh PA-C  [facility]

## 2018-11-21 ENCOUNTER — OFFICE VISIT (OUTPATIENT)
Dept: FAMILY MEDICINE | Facility: OTHER | Age: 63
End: 2018-11-21
Payer: COMMERCIAL

## 2018-11-21 VITALS
DIASTOLIC BLOOD PRESSURE: 70 MMHG | OXYGEN SATURATION: 98 % | HEART RATE: 90 BPM | TEMPERATURE: 97.4 F | RESPIRATION RATE: 16 BRPM | SYSTOLIC BLOOD PRESSURE: 130 MMHG | WEIGHT: 203 LBS | BODY MASS INDEX: 31.14 KG/M2

## 2018-11-21 DIAGNOSIS — Z23 NEED FOR PROPHYLACTIC VACCINATION WITH TETANUS-DIPHTHERIA (TD): ICD-10-CM

## 2018-11-21 DIAGNOSIS — S99.922A INJURY OF TOE ON LEFT FOOT, INITIAL ENCOUNTER: ICD-10-CM

## 2018-11-21 DIAGNOSIS — Z23 NEED FOR PROPHYLACTIC VACCINATION AND INOCULATION AGAINST INFLUENZA: ICD-10-CM

## 2018-11-21 DIAGNOSIS — M67.40 GANGLION CYST: Primary | ICD-10-CM

## 2018-11-21 PROCEDURE — 90471 IMMUNIZATION ADMIN: CPT | Performed by: PHYSICIAN ASSISTANT

## 2018-11-21 PROCEDURE — 90472 IMMUNIZATION ADMIN EACH ADD: CPT | Performed by: PHYSICIAN ASSISTANT

## 2018-11-21 PROCEDURE — 99214 OFFICE O/P EST MOD 30 MIN: CPT | Mod: 25 | Performed by: PHYSICIAN ASSISTANT

## 2018-11-21 PROCEDURE — 90686 IIV4 VACC NO PRSV 0.5 ML IM: CPT | Performed by: PHYSICIAN ASSISTANT

## 2018-11-21 PROCEDURE — 90715 TDAP VACCINE 7 YRS/> IM: CPT | Performed by: PHYSICIAN ASSISTANT

## 2018-11-21 ASSESSMENT — PAIN SCALES - GENERAL: PAINLEVEL: NO PAIN (0)

## 2018-11-21 NOTE — PATIENT INSTRUCTIONS
Ganglion Cyst          What is a ganglion cyst?   A ganglion cyst is a swollen, closed sac under the skin. The sac is attached to the sheath of a tendon or may be attached to a joint. The cyst contains fluid similar to the fluid that is in your joints. It can vary in size from a small pea to a golf ball. Ganglion cysts most often occur on the wrist, at the end joint of a finger, or at the base of a finger. They may also occur on the foot.   How does it occur?   The cause of ganglion cysts is not known.   What are the symptoms?   You may feel discomfort or pain. Sometimes the area of the cyst becomes swollen or disfigured.   How is it diagnosed?   If there is any question about the diagnosis, your healthcare provider may stick a needle into the cyst to take a sample of the fluid inside it. The fluid can be tested for other problems, such as infection.   How is it treated?   Unless a cyst hurts, it does not need to be treated. If it does hurt, put an ice pack, gel pack, or package of frozen vegetables, wrapped in a cloth, on the area for up to 20 minutes at a time every 3 to 4 hours, or at least once daily, until it becomes less painful. Taking an anti-inflammatory drug, such as aspirin or ibuprofen, may also help. Check with your healthcare provider before you give any medicine that contains aspirin or salicylates to a child or teen. This includes medicines like baby aspirin, some cold medicines, and Pepto Bismol. Children and teens who take aspirin are at risk for a serious illness called Reye's syndrome. Nonsteroidal anti-inflammatory medicines (NSAIDs) may cause stomach bleeding and other problems. These risks increase with age. Read the label and take as directed. Unless recommended by your healthcare provider, do not take for more than 10 days for any reason.   The fluid can be removed with a needle, but the cysts tend to fill up again with fluid.   Do not try to smash the cyst with a heavy object.  Even if this home remedy succeeds at first, the cyst will almost always fill up again with fluid. In addition, you could seriously damage your wrist or finger.   If a ganglion cyst is painful, limits activity, or is unsightly, it can be surgically removed. Surgery to remove the cyst requires making a small cut through the skin. The cut usually heals quickly and leaves a small scar.   How long will the effects last?   Sometimes cysts eventually go away whether they are treated or not. If your cyst is painful or interferes with your activities, you may need to have surgery. Even with surgical treatment, a cyst may come back.   How can I take care of myself?   Follow the treatment recommended by your healthcare provider.   How can I help prevent ganglion cysts?   There is no known way to prevent these cysts because their cause is not known.     Published by [employer].  This content is reviewed periodically and is subject to change as new health information becomes available. The information is intended to inform and educate and is not a replacement for medical evaluation, advice, diagnosis or treatment by a healthcare professional.   Developed by [employer].   ? 2010 [employer] and/or its affiliates. All Rights Reserved.   Copyright   [employer] 2011

## 2018-11-21 NOTE — PROGRESS NOTES
Injectable Influenza Immunization Documentation    1.  Is the person to be vaccinated sick today?   No    2. Does the person to be vaccinated have an allergy to a component   of the vaccine?   No  Egg Allergy Algorithm Link    3. Has the person to be vaccinated ever had a serious reaction   to influenza vaccine in the past?   No    4. Has the person to be vaccinated ever had Guillain-Barré syndrome?   No    Form completed by Tierney Salazar MA         Screening Questionnaire for Adult Immunization    Are you sick today?   No   Do you have allergies to medications, food, a vaccine component or latex?   No   Have you ever had a serious reaction after receiving a vaccination?   No   Do you have a long-term health problem with heart disease, lung disease, asthma, kidney disease, metabolic disease (e.g. diabetes), anemia, or other blood disorder?   No   Do you have cancer, leukemia, HIV/AIDS, or any other immune system problem?   No   In the past 3 months, have you taken medications that affect  your immune system, such as prednisone, other steroids, or anticancer drugs; drugs for the treatment of rheumatoid arthritis, Crohn s disease, or psoriasis; or have you had radiation treatments?   No   Have you had a seizure, or a brain or other nervous system problem?   No   During the past year, have you received a transfusion of blood or blood     products, or been given immune (gamma) globulin or antiviral drug?   No   For women: Are you pregnant or is there a chance you could become        pregnant during the next month?   No   Have you received any vaccinations in the past 4 weeks?   No     Immunization questionnaire answers were all negative.        Per orders of [facility], injection of TDAP given by Tierney Salazar. Patient instructed to remain in clinic for 15 minutes afterwards, and to report any adverse reaction to me immediately.       Screening performed by Tierney Salazar on 11/21/2018 at 10:12 AM.

## 2018-11-21 NOTE — MR AVS SNAPSHOT
After Visit Summary   11/21/2018    Krishna Dugan    MRN: [id number]           Patient Information     Date Of Birth          1955        Visit Information        Provider Department      11/21/2018 8:30 AM Sandra Yeh PA-C [facility]        Today's Diagnoses     Ganglion cyst    -  1    Injury of toe on left foot, initial encounter        Need for prophylactic vaccination and inoculation against influenza        Need for prophylactic vaccination with tetanus-diphtheria (Td)          Care Instructions                 Ganglion Cyst          What is a ganglion cyst?   A ganglion cyst is a swollen, closed sac under the skin. The sac is attached to the sheath of a tendon or may be attached to a joint. The cyst contains fluid similar to the fluid that is in your joints. It can vary in size from a small pea to a golf ball. Ganglion cysts most often occur on the wrist, at the end joint of a finger, or at the base of a finger. They may also occur on the foot.   How does it occur?   The cause of ganglion cysts is not known.   What are the symptoms?   You may feel discomfort or pain. Sometimes the area of the cyst becomes swollen or disfigured.   How is it diagnosed?   If there is any question about the diagnosis, your healthcare provider may stick a needle into the cyst to take a sample of the fluid inside it. The fluid can be tested for other problems, such as infection.   How is it treated?   Unless a cyst hurts, it does not need to be treated. If it does hurt, put an ice pack, gel pack, or package of frozen vegetables, wrapped in a cloth, on the area for up to 20 minutes at a time every 3 to 4 hours, or at least once daily, until it becomes less painful. Taking an anti-inflammatory drug, such as aspirin or ibuprofen, may also help. Check with your healthcare provider before you give any medicine that contains aspirin or salicylates to a child or teen. This  includes medicines like baby aspirin, some cold medicines, and Pepto Bismol. Children and teens who take aspirin are at risk for a serious illness called Reye's syndrome. Nonsteroidal anti-inflammatory medicines (NSAIDs) may cause stomach bleeding and other problems. These risks increase with age. Read the label and take as directed. Unless recommended by your healthcare provider, do not take for more than 10 days for any reason.   The fluid can be removed with a needle, but the cysts tend to fill up again with fluid.   Do not try to smash the cyst with a heavy object. Even if this home remedy succeeds at first, the cyst will almost always fill up again with fluid. In addition, you could seriously damage your wrist or finger.   If a ganglion cyst is painful, limits activity, or is unsightly, it can be surgically removed. Surgery to remove the cyst requires making a small cut through the skin. The cut usually heals quickly and leaves a small scar.   How long will the effects last?   Sometimes cysts eventually go away whether they are treated or not. If your cyst is painful or interferes with your activities, you may need to have surgery. Even with surgical treatment, a cyst may come back.   How can I take care of myself?   Follow the treatment recommended by your healthcare provider.   How can I help prevent ganglion cysts?   There is no known way to prevent these cysts because their cause is not known.     Published by [employer]  This content is reviewed periodically and is subject to change as new health information becomes available. The information is intended to inform and educate and is not a replacement for medical evaluation, advice, diagnosis or treatment by a healthcare professional.   Developed by [employer]   ? 2010 [employer] and/or its affiliates. All Rights Reserved.   Copyright   [employer] Reference Systems 2011                Follow-ups after your visit        Who to contact     If you have  questions or need follow up information about today's clinic visit or your schedule please contact [facility] directly at [phone].  Normal or non-critical lab and imaging results will be communicated to you by MyChart, letter or phone within 4 business days after the clinic has received the results. If you do not hear from us within 7 days, please contact the clinic through MyChart or phone. If you have a critical or abnormal lab result, we will notify you by phone as soon as possible.  Submit refill requests through [employer] or call your pharmacy and they will forward the refill request to us. Please allow 3 business days for your refill to be completed.          Additional Information About Your Visit        Care EveryWhere ID     This is your Care EveryWhere ID. This could be used by other organizations to access your [place] medical records  [id number]        Your Vitals Were     Pulse Temperature Respirations Pulse Oximetry BMI (Body Mass Index)       90 97.4  F (36.3  C) (Temporal) 16 98% 31.14 kg/m2        Blood Pressure from Last 3 Encounters:   11/21/18 130/70   08/15/18 143/81   08/06/18 130/70    Weight from Last 3 Encounters:   11/21/18 203 lb (92.1 kg)   08/15/18 204 lb (92.5 kg)   08/06/18 204 lb (92.5 kg)              We Performed the Following     FLU VACCINE, SPLIT VIRUS, IM (QUADRIVALENT) [46191]- >3 YRS     TDAP VACCINE (ADACEL)     Vaccine Administration, Initial [95693]        Primary Care Provider Office Phone # Fax #    Harshil ROJAS Nuñez [phone]       [facility] [address] [place] [address]        Equal Access to Services     [facility] : Hadii chandler Hua, waaxda luqmary, qaybta kaalradha sims. So [facility] [phone].    ATENCIÓN: Si habla español, tiene a limon disposición servicios gratuitos de asistencia lingüística. Marianame al [phone].    We comply with applicable federal  civil rights laws and [place] laws. We do not discriminate on the basis of race, color, national origin, age, disability, sex, sexual orientation, or gender identity.            Thank you!     Thank you for choosing [facility]  for your care. Our goal is always to provide you with excellent care. Hearing back from our patients is one way we can continue to improve our services. Please take a few minutes to complete the written survey that you may receive in the mail after your visit with us. Thank you!             Your Updated Medication List - Protect others around you: Learn how to safely use, store and throw away your medicines at www.disposemymeds.org.          This list is accurate as of 11/21/18  9:36 AM.  Always use your most recent med list.                   Brand Name Dispense Instructions for use Diagnosis    aspirin 81 MG chewable tablet      Take 81 mg by mouth        azelastine 0.1 % nasal spray    ASTELIN    1 Bottle    Spray 1-2 sprays into both nostrils 2 times daily    Allergic rhinitis due to dust mite, Allergic rhinitis due to animal dander       cetirizine 10 MG tablet    zyrTEC     Take 10 mg by mouth daily        CLARITIN 10 MG tablet   Generic drug:  loratadine      Take 10 mg by mouth daily    Perennial allergic rhinitis       ezetimibe 10 MG tablet    ZETIA     TK 1 T PO QD        fluticasone 50 MCG/ACT spray    FLONASE    1 Bottle    Spray 2 sprays into both nostrils daily        ibuprofen 800 MG tablet    ADVIL/MOTRIN    30 tablet    Take 1 tablet (800 mg) by mouth every 8 hours as needed for moderate pain    Complex tear of medial meniscus of left knee as current injury, initial encounter       * lisinopril 20 MG tablet    PRINIVIL/ZESTRIL     Take 20 mg by mouth daily.        * lisinopril 10 MG tablet    PRINIVIL/ZESTRIL          meloxicam 15 MG tablet    MOBIC    60 tablet    Take 1 tablet (15 mg) by mouth daily    Complex tear of medial meniscus of right knee as  current injury, initial encounter       * omeprazole 40 MG capsule    priLOSEC     Take by mouth daily.        * omeprazole 20 MG CR capsule    priLOSEC     Take 20 mg by mouth        VITAMIN D (CHOLECALCIFEROL) PO      Take 5,000 Units by mouth daily        * Notice:  This list has 4 medication(s) that are the same as other medications prescribed for you. Read the directions carefully, and ask your doctor or other care provider to review them with you.

## 2019-04-04 ENCOUNTER — TELEPHONE (OUTPATIENT)
Dept: OCCUPATIONAL MEDICINE | Facility: CLINIC | Age: 64
End: 2019-04-04

## 2019-04-04 NOTE — TELEPHONE ENCOUNTER
Summary:    Patient is due/failing the following:   COLONOSCOPY, LDL and PHYSICAL    Action needed:   Patient needs office visit for Physical ., Patient needs fasting lab only appointment and schedule a colonoscopy or complete a FIT test     Type of outreach:    Phone, spoke to patient.  patient will call back to schedule     Questions for provider review:    Maria Luz Colon       Chart routed to Care Team .          Panel Management Review      Patient has the following on his problem list:     Hypertension   Last three blood pressure readings:  BP Readings from Last 3 Encounters:   11/21/18 130/70   08/15/18 143/81   08/06/18 130/70     Blood pressure: Passed    HTN Guidelines:  Age 18-59 BP range:  Less than 140/90  Age 60-85 with Diabetes:  Less than 140/90  Age 60-85 without Diabetes:  less than 150/90      Composite cancer screening  Chart review shows that this patient is due/due soon for the following Colonoscopy

## 2019-08-12 NOTE — PROGRESS NOTES
Subjective     Krishna Dugan is a 64 year old male who presents to clinic today for the following health issues:    HPI   Back Pain   The last few days he now as a stiff neck as well.     Duration: a few months         Specific cause: none    Description:   Location of pain: low back right  Character of pain: dull ache  Pain radiation:down the leg occasionally as the day goes on   New numbness or weakness in legs, not attributed to pain:  no     Intensity: Currently 7/10    History:   Pain interferes with job: YES  History of back problems: yes  Any previous MRI or X-rays: None  Sees a specialist for back pain:  No  Therapies tried without relief: heat, ibuprofen    Alleviating factors:   Improved by:       Precipitating factors:  Worsened by: leaning forward     Accompanying Signs & Symptoms:  Risk of Fracture:  None  Risk of Cauda Equina:  None  Risk of Infection:  None  Risk of Cancer:  None  Risk of Ankylosing Spondylitis:  Onset at age <35, male, AND morning back stiffness. no     Patient works as a  he does do a lot of heavy lifting. He has history of lbp and cervical spine many years ago has he had BEN's in both areas that worked well.     Currently is using heat and ibuprofen and does not seem to be improving states typically he can work it out.    He states pain is mid thoracic spine radiates up to neck and down spine bilateral legs feel week at time.     Patient Active Problem List   Diagnosis     Corneal foreign body     Essential hypertension, benign     Flushing     Hyperlipidemia     S/P colonoscopic polypectomy     Allergic rhinitis due to animal dander     Allergic rhinitis due to dust mite     Sinus pressure     Abnormal involuntary movements     Blood in stool     Gastroesophageal reflux disease without esophagitis     Other testicular hypofunction     Vitamin D deficiency     History reviewed. No pertinent surgical history.    Social History     Tobacco Use     Smoking status: Former  Smoker     Packs/day: 0.50     Years: 10.00     Pack years: 5.00     Types: Cigarettes     Smokeless tobacco: Never Used   Substance Use Topics     Alcohol use: No     Comment: very little     History reviewed. No pertinent family history.      Current Outpatient Medications   Medication Sig Dispense Refill     aspirin 81 MG chewable tablet Take 81 mg by mouth       azelastine (ASTELIN) 0.1 % spray Spray 1-2 sprays into both nostrils 2 times daily 1 Bottle 3     cetirizine (ZYRTEC) 10 MG tablet Take 10 mg by mouth daily       ezetimibe (ZETIA) 10 MG tablet TK 1 T PO QD  3     fluticasone (FLONASE) 50 MCG/ACT spray Spray 2 sprays into both nostrils daily 1 Bottle 11     ibuprofen (ADVIL/MOTRIN) 800 MG tablet Take 1 tablet (800 mg) by mouth every 8 hours as needed for moderate pain 30 tablet 0     lisinopril (PRINIVIL,ZESTRIL) 20 MG tablet Take 20 mg by mouth daily.        lisinopril (PRINIVIL/ZESTRIL) 10 MG tablet   1     loratadine (CLARITIN) 10 MG tablet Take 10 mg by mouth daily       omeprazole (PRILOSEC) 20 MG CR capsule Take 20 mg by mouth       omeprazole (PRILOSEC) 40 MG capsule Take by mouth daily.        VITAMIN D, CHOLECALCIFEROL, PO Take 5,000 Units by mouth daily       Allergies   Allergen Reactions     Valdecoxib      Ears turn purple  Facial flushing  Bextra     Recent Labs   Lab Test 01/10/17   CR 1.52*   GFRESTIMATED 47*   GFRESTBLACK 57*   POTASSIUM 4.6      BP Readings from Last 3 Encounters:   08/13/19 130/84   11/21/18 130/70   08/15/18 143/81    Wt Readings from Last 3 Encounters:   08/13/19 89.8 kg (198 lb)   11/21/18 92.1 kg (203 lb)   08/15/18 92.5 kg (204 lb)                    Reviewed and updated as needed this visit by Provider         Review of Systems   ROS COMP: Constitutional, HEENT, cardiovascular, pulmonary, GI, , musculoskeletal, neuro, skin, endocrine and psych systems are negative, except as otherwise noted.      Objective    There were no vitals taken for this visit.  There is  no height or weight on file to calculate BMI.  Physical Exam   GENERAL: healthy, alert and no distress  NECK: no adenopathy, no asymmetry, masses, or scars and thyroid normal to palpation  RESP: lungs clear to auscultation - no rales, rhonchi or wheezes  CV: regular rate and rhythm, normal S1 S2, no S3 or S4, no murmur, click or rub, no peripheral edema and peripheral pulses strong  ABDOMEN: soft, nontender, no hepatosplenomegaly, no masses and bowel sounds normal  MS: Cervical Spine Exam: Inspection: normal cervical lordosis, no scapular winging  Tender:  left trapezius muscles, right trapezius muscles  Range of Motion:  Full ROM of cervical spine  Strength: Full strength of all neck muscles  Lumber/Thoracic Spine Exam: Tender:  left para lumbar muscles, right para lumbar muscles, right SI joint  Range of Motion:  lumbar flexion  decreased, painful, lumbar extension  decreased, painful, left lateral lumbar bending  decreased, painful, right lateral lumbar bending  decreased, painful, left lateral lumbar rotation  decreased, painful, right lateral lumbar rotation  decreased, painful  Strength:  able to heel walk, able to toe walk, gastrocsoleus   5/5, hamstrings  5/5, quadriceps  5/5  Special tests:  negative straight leg raises  Hip Exam: Hip ROM full  SKIN: no suspicious lesions or rashes  NEURO: Normal strength and tone, mentation intact and speech normal           Assessment & Plan     1. Neck pain  Recommend PT for stretching and strengthening discussed neck pain likely postural as trapezius are strained.   - PHYSICAL THERAPY REFERRAL; Future    2. Acute bilateral low back pain with righ-sided sciatica  See AVS  - PHYSICAL THERAPY REFERRAL; Future       Patient Instructions   Recommend physical therapy for further treatment of back pain with follow up in 6 weeks if symptoms are not improving.     Ibuprofen 600 mg every 6 hours as needed with food or Aleve 2 in am and 2 in pm as needed with food. May take  tylenol 500 mg 2 tabs 3 times daily as needed with food as well.     Elvia Muñoz [facility]

## 2019-08-13 ENCOUNTER — OFFICE VISIT (OUTPATIENT)
Dept: FAMILY MEDICINE | Facility: OTHER | Age: 64
End: 2019-08-13
Payer: COMMERCIAL

## 2019-08-13 VITALS
SYSTOLIC BLOOD PRESSURE: 130 MMHG | DIASTOLIC BLOOD PRESSURE: 84 MMHG | WEIGHT: 198 LBS | OXYGEN SATURATION: 98 % | RESPIRATION RATE: 16 BRPM | BODY MASS INDEX: 30.37 KG/M2 | TEMPERATURE: 98.6 F | HEART RATE: 80 BPM

## 2019-08-13 DIAGNOSIS — M54.42 ACUTE BILATERAL LOW BACK PAIN WITH LEFT-SIDED SCIATICA: ICD-10-CM

## 2019-08-13 DIAGNOSIS — M54.2 NECK PAIN: Primary | ICD-10-CM

## 2019-08-13 PROBLEM — K92.1 BLOOD IN STOOL: Status: ACTIVE | Noted: 2019-03-15

## 2019-08-13 PROBLEM — R25.9 ABNORMAL INVOLUNTARY MOVEMENTS: Status: ACTIVE | Noted: 2019-03-15

## 2019-08-13 PROBLEM — E55.9 VITAMIN D DEFICIENCY: Status: ACTIVE | Noted: 2019-03-15

## 2019-08-13 PROBLEM — E29.1 OTHER TESTICULAR HYPOFUNCTION: Status: ACTIVE | Noted: 2019-03-15

## 2019-08-13 PROCEDURE — 99213 OFFICE O/P EST LOW 20 MIN: CPT | Performed by: NURSE PRACTITIONER

## 2019-08-13 NOTE — PATIENT INSTRUCTIONS
Recommend physical therapy for further treatment of back pain with follow up in 6 weeks if symptoms are not improving.     Ibuprofen 600 mg every 6 hours as needed with food or Aleve 2 in am and 2 in pm as needed with food. May take tylenol 500 mg 2 tabs 3 times daily as needed with food as well.     Elvia Muñoz CNP

## 2019-08-28 ENCOUNTER — HOSPITAL ENCOUNTER (OUTPATIENT)
Dept: PHYSICAL THERAPY | Facility: OTHER | Age: 64
Setting detail: THERAPIES SERIES
End: 2019-08-28
Attending: NURSE PRACTITIONER
Payer: COMMERCIAL

## 2019-08-28 DIAGNOSIS — M54.42 ACUTE BILATERAL LOW BACK PAIN WITH LEFT-SIDED SCIATICA: ICD-10-CM

## 2019-08-28 DIAGNOSIS — M54.2 NECK PAIN: ICD-10-CM

## 2019-08-28 PROCEDURE — 97162 PT EVAL MOD COMPLEX 30 MIN: CPT | Mod: GP | Performed by: PHYSICAL THERAPIST

## 2019-08-28 PROCEDURE — 97530 THERAPEUTIC ACTIVITIES: CPT | Mod: GP | Performed by: PHYSICAL THERAPIST

## 2019-08-28 PROCEDURE — 97110 THERAPEUTIC EXERCISES: CPT | Mod: GP | Performed by: PHYSICAL THERAPIST

## 2019-09-04 NOTE — PROGRESS NOTES
08/28/19 8960   General Information   Type of Visit Initial OP Ortho PT Evaluation   Start of Care Date 08/28/19   Referring Physician Elvia Muñoz CNP   Patient/Family Goals Statement To get rid of the back and neck pain.    Orders Evaluate and Treat   Orders Comment None   Date of Order 08/13/19   Certification Required? No   Medical Diagnosis Neck pain, acute bilateral low back pain with right sciatica   Surgical/Medical history reviewed Yes   Precautions/Limitations no known precautions/limitations   Weight-Bearing Status - LUE full weight-bearing   Weight-Bearing Status - RUE full weight-bearing   Weight-Bearing Status - LLE full weight-bearing   Weight-Bearing Status - RLE full weight-bearing   Body Part(s)   Body Part(s) Cervical Spine;Lumbar Spine/SI   Presentation and Etiology   Pertinent history of current problem (include personal factors and/or comorbidities that impact the POC) Brent reports that he started with increased low back pain about 2 months ago. He reports that he has always had low back pain off and was always able to work out of it after 2 weeks. This has been going on for 2 months and then he also started noticing neck and headache pain 3-4 months ago. He admits to tripping last year and was pretty black and blue.    Impairments A. Pain;D. Decreased ROM;E. Decreased flexibility   Functional Limitations perform activities of daily living;perform required work activities;perform desired leisure / sports activities   Symptom Location Neck, headache, thoracici pain and low back pain.    How/Where did it occur From insidious onset;From Degenerative Joint Disease   Onset date of current episode/exacerbation 06/28/19   Chronicity Chronic   Pain rating (0-10 point scale) Other   Pain rating comment Neck pain is a 3.5/10 ave. and 6/10 at worst, the thoracic pain is a 4/10 ave. and 9/10 at worst and the headache pain is a 3/10 ave. and 6/10 at worst.    Pain quality A. Sharp;B. Dull;C. Aching    Frequency of pain/symptoms A. Constant   Pain/symptoms are: Worse in the morning   Pain/symptoms exacerbated by A. Sitting;C. Lifting;D. Carrying;G. Certain positions;I. Bending   Pain/symptoms eased by E. Changing positions;I. OTC medication(s)   Progression of symptoms since onset: Improved  (The last 2 weeks. )   Prior Level of Function   Prior Level of Function-Mobility Very active, does not use an assistive device.    Prior Level of Function-ADLs Independent   Current Level of Function   Current Community Support Family/friend caregiver   Patient role/employment history A. Employed   Employment Comments Works as a  full time.   Living environment [place]   Home/community accessibility No concerns.   Current equipment-Gait/Locomotion None   Current equipment-ADL None   Fall Risk Screen   Fall screen completed by PT   Have you fallen 2 or more times in the past year? No   Have you fallen and had an injury in the past year? No   Is patient a fall risk? No   System Outcome Measures   Outcome Measures Low Back Pain (see Oswestry and Paulina)   Functional Scales   Functional Scales Other   Other Scales  Pt. self reports she is functioning at 50% of her normal.    Cervical Spine   Observation Brent is a healthy looking 64 year old. Who demonstrates some moderate pain behaviors.    Integumentary  Intact   Posture Moderate forward head in sitting and standing.    Cervical Flexion ROM Limited by 25%   Cervical Extension ROM Limited by 50%   Cervical Right Side Bending ROM Limited by 50%   Cervical Left Side Bending ROM Limited by 50%   Cervical Right Rotation ROM Limited by 75%   Cervical Left Rotation ROM Limited by 75%   Cervical/Shoulder Special Tests Comments Directional preference using Static/Dynamic Force Analysis. Starting symptoms in sittin, Seated cervical protrusion produces lower rib pain 2/10. Seated retraction to 25% produces lower rib pain 2/10. Seated extension to 25% produces pain  between the shoulder blades, 1/10, then it moves to the bottom of the ribs 1/10. Supine flat abolishes all symptoms.    Biceps Reflexes Present and equal   Brachioradialis Reflexes Present and equal   Triceps Reflexes Present and equal.    Planned Therapy Interventions   Planned Therapy Interventions joint mobilization;manual therapy;neuromuscular re-education;ROM;strengthening;stretching   Planned Therapy Interventions Comment directional preference   Planned Modality Interventions   Planned Modality Interventions Electrical stimulation;Ultrasound;Traction   Planned Modality Interventions Comments Will use as needed for symptom control.    Clinical Impression   Criteria for Skilled Therapeutic Interventions Met yes, treatment indicated   PT Diagnosis Mechanical cervical involvement, with headaches and thoracic pain. Decreased ROM and function.    Influenced by the following impairments Pain, decreased ROM, headaches, hand numbness.    Functional limitations due to impairments Bending and turning neck/head, sitting, walking and sleeping through the night.    Clinical Presentation Evolving/Changing   Clinical Presentation Rationale Clinical judgement, multiple body systems involved.    Clinical Decision Making (Complexity) Moderate complexity   Therapy Frequency 1 time/week   Predicted Duration of Therapy Intervention (days/wks) 12 visits.   Risk & Benefits of therapy have been explained Yes   Patient, Family & other staff in agreement with plan of care Yes   Clinical Impression Comments Mechanical cervical and lumbar symptoms.    Education Assessment   Preferred Learning Style Listening;Reading;Demonstration   Barriers to Learning No barriers   ORTHO GOALS   PT Ortho Eval Goals 1;2;3   Ortho Goal 1   Goal Identifier Symptoms   Goal Description Brent will use strategies learned in P.T. to decrease symptom levels by 50% to help reach personal goal of symptom relief.    Target Date 09/27/19   Ortho Goal 2   Goal  Identifier Sleep   Goal Description Brent will sleep through the night without waking due to pain 7 days in a row, allowing for an improved night's sleep.    Target Date 10/27/19   Ortho Goal 3   Goal Identifier Function   Goal Description Brent will improve his overall functional level by 50% per. self reporting and NDI/OSCAR   Target Date 11/26/19   Total Evaluation Time   PT Eval, Moderate Complexity Minutes (51766) 30

## 2019-09-17 ENCOUNTER — HOSPITAL ENCOUNTER (OUTPATIENT)
Dept: PHYSICAL THERAPY | Facility: OTHER | Age: 64
Setting detail: THERAPIES SERIES
End: 2019-09-17
Attending: NURSE PRACTITIONER
Payer: COMMERCIAL

## 2019-09-17 PROCEDURE — 97110 THERAPEUTIC EXERCISES: CPT | Mod: GP | Performed by: PHYSICAL THERAPIST

## 2019-09-17 PROCEDURE — 97530 THERAPEUTIC ACTIVITIES: CPT | Mod: GP | Performed by: PHYSICAL THERAPIST

## 2019-10-01 ENCOUNTER — HOSPITAL ENCOUNTER (OUTPATIENT)
Dept: PHYSICAL THERAPY | Facility: OTHER | Age: 64
Setting detail: THERAPIES SERIES
End: 2019-10-01
Attending: NURSE PRACTITIONER
Payer: COMMERCIAL

## 2019-10-01 PROCEDURE — 97530 THERAPEUTIC ACTIVITIES: CPT | Mod: GP | Performed by: PHYSICAL THERAPIST

## 2019-10-01 PROCEDURE — 97110 THERAPEUTIC EXERCISES: CPT | Mod: GP | Performed by: PHYSICAL THERAPIST

## 2019-10-15 ENCOUNTER — HOSPITAL ENCOUNTER (OUTPATIENT)
Dept: PHYSICAL THERAPY | Facility: OTHER | Age: 64
Setting detail: THERAPIES SERIES
End: 2019-10-15
Attending: NURSE PRACTITIONER
Payer: COMMERCIAL

## 2019-10-15 PROCEDURE — 97110 THERAPEUTIC EXERCISES: CPT | Mod: GP | Performed by: PHYSICAL THERAPIST

## 2019-10-15 PROCEDURE — 97530 THERAPEUTIC ACTIVITIES: CPT | Mod: GP | Performed by: PHYSICAL THERAPIST

## 2019-10-16 ENCOUNTER — TELEPHONE (OUTPATIENT)
Dept: FAMILY MEDICINE | Facility: OTHER | Age: 64
End: 2019-10-16

## 2019-10-16 DIAGNOSIS — M54.42 ACUTE BILATERAL LOW BACK PAIN WITH LEFT-SIDED SCIATICA: Primary | ICD-10-CM

## 2019-10-16 DIAGNOSIS — F40.240 CLAUSTROPHOBIA: Primary | ICD-10-CM

## 2019-10-16 NOTE — TELEPHONE ENCOUNTER
Patient is scheduled for MRI on 10/18 at 4:45pm arrival time with 5:45pm MRI. Patient is claustrophobic. Patient is to have a  and wear comfortable clothes. He is to bring medication for claustrophobia to appointment with him and they will instruct him to take medication.     Will flag for provider to review. Please send appropriate medication for MRI.     Explained all this to patient. Patient seems hesitant and states he has a lot to do within the next couple days. Gave patient phone number to call scheduling if he decides to reschedule. Please call patient once medication is sent to pharmacy.

## 2019-10-16 NOTE — TELEPHONE ENCOUNTER
Please call Krishna to help schedule MRI of lumbar spine order placed per PT recommendation of not improving with conservative therapies.     Thank you  Elvia Muñoz CNP

## 2019-10-18 ENCOUNTER — HOSPITAL ENCOUNTER (OUTPATIENT)
Dept: MRI IMAGING | Facility: CLINIC | Age: 64
Discharge: HOME | End: 2019-10-18
Attending: NURSE PRACTITIONER | Admitting: NURSE PRACTITIONER
Payer: COMMERCIAL

## 2019-10-18 DIAGNOSIS — M54.42 ACUTE BILATERAL LOW BACK PAIN WITH LEFT-SIDED SCIATICA: ICD-10-CM

## 2019-10-18 PROCEDURE — 72148 MRI LUMBAR SPINE W/O DYE: CPT

## 2019-10-18 RX ORDER — DIAZEPAM 10 MG
10 TABLET ORAL ONCE
Qty: 1 TABLET | Refills: 0 | Status: SHIPPED | OUTPATIENT
Start: 2019-10-18 | End: 2019-12-12

## 2019-10-18 NOTE — TELEPHONE ENCOUNTER
Valium 10 mg 1/2 hour prior to procedure sent to [facility] in ER do not drive with this     Thank you  Elvia Muñoz CNP

## 2019-10-19 LAB — RADIOLOGIST FLAGS: ABNORMAL

## 2019-10-21 DIAGNOSIS — R93.89 ABNORMAL MRI: Primary | ICD-10-CM

## 2019-10-21 DIAGNOSIS — M54.42 ACUTE BILATERAL LOW BACK PAIN WITH LEFT-SIDED SCIATICA: ICD-10-CM

## 2019-10-21 NOTE — RESULT ENCOUNTER NOTE
I spoke with Krishna Dugan,  1955, we reviewed his MRI results I will be placing orders for CT of abdomen for further evaluation of the lesion right upper quadrant please help schedule this for him. I also placing order for referral to ortho spine he will be contacted by ortho to schedule this for follow up evaluation of spine.    [phone] (home) [phone] (work)  Thank you  Elvia Muñoz CNP

## 2019-10-25 ENCOUNTER — HOSPITAL ENCOUNTER (OUTPATIENT)
Dept: CT IMAGING | Facility: CLINIC | Age: 64
Discharge: HOME | End: 2019-10-25
Attending: NURSE PRACTITIONER | Admitting: NURSE PRACTITIONER
Payer: COMMERCIAL

## 2019-10-25 DIAGNOSIS — R93.89 ABNORMAL MRI: ICD-10-CM

## 2019-10-25 PROCEDURE — 25000128 H RX IP 250 OP 636: Performed by: RADIOLOGY

## 2019-10-25 PROCEDURE — 25000125 ZZHC RX 250: Performed by: RADIOLOGY

## 2019-10-25 PROCEDURE — 74177 CT ABD & PELVIS W/CONTRAST: CPT

## 2019-10-25 RX ORDER — IOPAMIDOL 755 MG/ML
500 INJECTION, SOLUTION INTRAVASCULAR ONCE
Status: COMPLETED | OUTPATIENT
Start: 2019-10-25 | End: 2019-10-25

## 2019-10-25 RX ADMIN — SODIUM CHLORIDE 60 ML: 9 INJECTION, SOLUTION INTRAVENOUS at 09:39

## 2019-10-25 RX ADMIN — IOPAMIDOL 97 ML: 755 INJECTION, SOLUTION INTRAVENOUS at 09:39

## 2019-10-28 NOTE — RESULT ENCOUNTER NOTE
Please advise Krishna Dugan,  1955, that his CT results were indicating per radiologist read no kidney abnormality seen. There are noted to be two inguinal hernias moderate size on L and small on R. There is diverticula in colon no indication of inflammation of these. If you would like to follow up on hernias would recommend seeing Dr. Mendiola general surgeon in [place] please let me know and I will place referral.   [phone] (home) [phone] (work)  Thank you  Elvia Muñoz CNP

## 2019-10-30 ENCOUNTER — HOSPITAL ENCOUNTER (OUTPATIENT)
Dept: PHYSICAL THERAPY | Facility: OTHER | Age: 64
Setting detail: THERAPIES SERIES
End: 2019-10-30
Attending: NURSE PRACTITIONER
Payer: COMMERCIAL

## 2019-10-30 PROCEDURE — 97110 THERAPEUTIC EXERCISES: CPT | Mod: GP | Performed by: PHYSICAL THERAPIST

## 2019-10-30 PROCEDURE — 97530 THERAPEUTIC ACTIVITIES: CPT | Mod: GP | Performed by: PHYSICAL THERAPIST

## 2019-11-07 ENCOUNTER — HOSPITAL ENCOUNTER (OUTPATIENT)
Dept: PHYSICAL THERAPY | Facility: OTHER | Age: 64
Setting detail: THERAPIES SERIES
End: 2019-11-07
Attending: NURSE PRACTITIONER
Payer: COMMERCIAL

## 2019-11-07 PROCEDURE — 97110 THERAPEUTIC EXERCISES: CPT | Mod: GP | Performed by: PHYSICAL THERAPIST

## 2019-11-14 ENCOUNTER — TELEPHONE (OUTPATIENT)
Dept: PALLIATIVE MEDICINE | Facility: OTHER | Age: 64
End: 2019-11-14

## 2019-11-14 ENCOUNTER — OFFICE VISIT (OUTPATIENT)
Dept: NEUROSURGERY | Facility: CLINIC | Age: 64
End: 2019-11-14
Payer: COMMERCIAL

## 2019-11-14 VITALS
HEIGHT: 68 IN | OXYGEN SATURATION: 99 % | TEMPERATURE: 97.2 F | DIASTOLIC BLOOD PRESSURE: 82 MMHG | HEART RATE: 83 BPM | SYSTOLIC BLOOD PRESSURE: 138 MMHG | WEIGHT: 206.8 LBS | BODY MASS INDEX: 31.34 KG/M2 | RESPIRATION RATE: 16 BRPM

## 2019-11-14 DIAGNOSIS — M54.50 ACUTE RIGHT-SIDED LOW BACK PAIN WITHOUT SCIATICA: Primary | ICD-10-CM

## 2019-11-14 PROCEDURE — 99203 OFFICE O/P NEW LOW 30 MIN: CPT | Performed by: NEUROLOGICAL SURGERY

## 2019-11-14 RX ORDER — DIAZEPAM 5 MG
5 TABLET ORAL ONCE
Qty: 2 TABLET | Refills: 0 | Status: SHIPPED | OUTPATIENT
Start: 2019-11-14 | End: 2019-12-12

## 2019-11-14 ASSESSMENT — MIFFLIN-ST. JEOR: SCORE: 1697.54

## 2019-11-14 ASSESSMENT — PAIN SCALES - GENERAL: PAINLEVEL: MODERATE PAIN (5)

## 2019-11-14 NOTE — PROGRESS NOTES
I was asked by Dr. Muñoz to see this patient in consultation    64M w/ lumbar spondylosis, right sided back pain.  Years of chronic back pain, much worse in the last 6 months.  Right sided flank and chest spasms, exacerbated by a long day at work.  Radiation to the hips, but no radiation to the legs.  MR Lumbar spine without significant stenosis.       Past Medical History:   Diagnosis Date     Diagnostic skin and sensitization tests 9/08 skin tests pos. for cat/dog ONLY (all other environmental allergens NEGATIVE)     No past surgical history on file.  Social History     Socioeconomic History     Marital status:      Spouse name: Not on file     Number of children: Not on file     Years of education: Not on file     Highest education level: Not on file   Occupational History     Not on file   Social Needs     Financial resource strain: Not on file     Food insecurity:     Worry: Not on file     Inability: Not on file     Transportation needs:     Medical: Not on file     Non-medical: Not on file   Tobacco Use     Smoking status: Former Smoker     Packs/day: 0.50     Years: 10.00     Pack years: 5.00     Types: Cigarettes     Smokeless tobacco: Never Used   Substance and Sexual Activity     Alcohol use: No     Comment: very little     Drug use: No     Sexual activity: Not Currently     Partners: Female   Lifestyle     Physical activity:     Days per week: Not on file     Minutes per session: Not on file     Stress: Not on file   Relationships     Social connections:     Talks on phone: Not on file     Gets together: Not on file     Attends Scientologist service: Not on file     Active member of club or organization: Not on file     Attends meetings of clubs or organizations: Not on file     Relationship status: Not on file     Intimate partner violence:     Fear of current or ex partner: Not on file     Emotionally abused: Not on file     Physically abused: Not on file     Forced sexual activity: Not on file    Other Topics Concern     Parent/sibling w/ CABG, MI or angioplasty before 65F 55M? Yes     Comment: dad at 53, brother 51   Social History Narrative     Not on file     No family history on file.     ROS: 10 point ROS neg other than the symptoms noted above in the HPI.    Physical Exam  There were no vitals taken for this visit.  HEENT:  Normocephalic, atraumatic.  PERRLA.  EOM s intact.  Visual fields full to gross exam  Neck:  Supple, non-tender, without lymphadenopathy.  Heart:  No peripheral edema  Lungs:  No SOB  Abdomen:  Non-distended.   Skin:  Warm and dry.  Extremities:  No edema, cyanosis or clubbing.  Psychiatric:  No apparent distress  Musculoskeletal:  Normal bulk and tone    NEUROLOGICAL EXAMINATION:     Mental status:  Alert and Oriented x 3, speech is fluent.  Cranial nerves:  II-XII intact.   Motor:    Shoulder Abduction:  Right:  5/5   Left:  5/5  Biceps:                      Right:  5/5   Left:  5/5  Triceps:                     Right:  5/5   Left:  5/5  Wrist Extensors:       Right:  5/5   Left:  5/5  Wrist Flexors:           Right:  5/5   Left:  5/5  interosseus :            Right:  5/5   Left:  5/5  Hip Flexion:                Right: 5/5  Left:  5/5  Quadriceps:             Right:  5/5  Left:  5/5  Hamstrings:             Right:  5/5  Left:  5/5  Gastroc Soleus:        Right:  5/5  Left:  5/5  Tib/Ant:                      Right:  5/5  Left:  5/5  EHL:                     Right:  5/5  Left:  5/5  Sensation:  Intact  Reflexes:  Negative Babinski.  Negative Clonus.  Negative Ray's.  Coordination:  Smooth finger to nose testing.   Negative pronator drift.  Smooth tandem walking.    A/P:  64M w/ lumbar spondylosis, right sided back pain    I had a discussion with the patient, reviewing the history, symptoms, and imaging  Will obtain MR Thoracic Spine  Will refer to Pain Clinic to consider trigger point injections vs MBB/RFA

## 2019-11-14 NOTE — LETTER
11/14/2019         RE: Krishna Dugan  [address] [facility] [address]        Dear Colleague,    Thank you for referring your patient, Krishna Dugan, to the [facility]. Please see a copy of my visit note below.    I was asked by Dr. Muñoz to see this patient in consultation    64M w/ lumbar spondylosis, right sided back pain.  Years of chronic back pain, much worse in the last 6 months.  Right sided flank and chest spasms, exacerbated by a long day at work.  Radiation to the hips, but no radiation to the legs.  MR Lumbar spine without significant stenosis.       Past Medical History:   Diagnosis Date     Diagnostic skin and sensitization tests 9/08 skin tests pos. for cat/dog ONLY (all other environmental allergens NEGATIVE)     No past surgical history on file.  Social History     Socioeconomic History     Marital status:      Spouse name: Not on file     Number of children: Not on file     Years of education: Not on file     Highest education level: Not on file   Occupational History     Not on file   Social Needs     Financial resource strain: Not on file     Food insecurity:     Worry: Not on file     Inability: Not on file     Transportation needs:     Medical: Not on file     Non-medical: Not on file   Tobacco Use     Smoking status: Former Smoker     Packs/day: 0.50     Years: 10.00     Pack years: 5.00     Types: Cigarettes     Smokeless tobacco: Never Used   Substance and Sexual Activity     Alcohol use: No     Comment: very little     Drug use: No     Sexual activity: Not Currently     Partners: Female   Lifestyle     Physical activity:     Days per week: Not on file     Minutes per session: Not on file     Stress: Not on file   Relationships     Social connections:     Talks on phone: Not on file     Gets together: Not on file     Attends Mandaen service: Not on file     Active member of club or organization: Not on file     Attends meetings of clubs  or organizations: Not on file     Relationship status: Not on file     Intimate partner violence:     Fear of current or ex partner: Not on file     Emotionally abused: Not on file     Physically abused: Not on file     Forced sexual activity: Not on file   Other Topics Concern     Parent/sibling w/ CABG, MI or angioplasty before 65F 55M? Yes     Comment: dad at 53, brother 51   Social History Narrative     Not on file     No family history on file.     ROS: 10 point ROS neg other than the symptoms noted above in the HPI.    Physical Exam  There were no vitals taken for this visit.  HEENT:  Normocephalic, atraumatic.  PERRLA.  EOM s intact.  Visual fields full to gross exam  Neck:  Supple, non-tender, without lymphadenopathy.  Heart:  No peripheral edema  Lungs:  No SOB  Abdomen:  Non-distended.   Skin:  Warm and dry.  Extremities:  No edema, cyanosis or clubbing.  Psychiatric:  No apparent distress  Musculoskeletal:  Normal bulk and tone    NEUROLOGICAL EXAMINATION:     Mental status:  Alert and Oriented x 3, speech is fluent.  Cranial nerves:  II-XII intact.   Motor:    Shoulder Abduction:  Right:  5/5   Left:  5/5  Biceps:                      Right:  5/5   Left:  5/5  Triceps:                     Right:  5/5   Left:  5/5  Wrist Extensors:       Right:  5/5   Left:  5/5  Wrist Flexors:           Right:  5/5   Left:  5/5  interosseus :            Right:  5/5   Left:  5/5  Hip Flexion:                Right: 5/5  Left:  5/5  Quadriceps:             Right:  5/5  Left:  5/5  Hamstrings:             Right:  5/5  Left:  5/5  Gastroc Soleus:        Right:  5/5  Left:  5/5  Tib/Ant:                      Right:  5/5  Left:  5/5  EHL:                     Right:  5/5  Left:  5/5  Sensation:  Intact  Reflexes:  Negative Babinski.  Negative Clonus.  Negative Ray's.  Coordination:  Smooth finger to nose testing.   Negative pronator drift.  Smooth tandem walking.    A/P:  64M w/ lumbar spondylosis, right sided back pain    I  had a discussion with the patient, reviewing the history, symptoms, and imaging  Will obtain MR Thoracic Spine  Will refer to Pain Clinic to consider trigger point injections vs MBB/RFA         Again, thank you for allowing me to participate in the care of your patient.        Sincerely,        Kenneth Ward MD

## 2019-11-14 NOTE — PATIENT INSTRUCTIONS
Today's Visit    1. Ordered a MRI THORACIC scan. To schedule, please call [phone]. You should schedule this within one week. We will call you with the results. If you don't hear from us 7 days after the scan, please call us.  2. Referral to pain management. They will call  You to schedule.  3.  script for diazepam given to patient- to take prior to MRI    Please call our clinic with any questions or concerns    Edwina YEAGER RN ([facility] Nurse)  [facility] -- [address] [address]  Phone:  [phone]   Fax:  [phone]

## 2019-11-14 NOTE — TELEPHONE ENCOUNTER
Talked to patient to schedule new eval, will call back at another time      Rafia Murrell    [facility]

## 2019-11-21 ENCOUNTER — HOSPITAL ENCOUNTER (OUTPATIENT)
Dept: MRI IMAGING | Facility: CLINIC | Age: 64
Discharge: HOME | End: 2019-11-21
Attending: NEUROLOGICAL SURGERY | Admitting: NEUROLOGICAL SURGERY
Payer: COMMERCIAL

## 2019-11-21 DIAGNOSIS — M54.50 ACUTE RIGHT-SIDED LOW BACK PAIN WITHOUT SCIATICA: ICD-10-CM

## 2019-11-21 PROCEDURE — 72146 MRI CHEST SPINE W/O DYE: CPT

## 2019-11-22 ENCOUNTER — TELEPHONE (OUTPATIENT)
Dept: NEUROSURGERY | Facility: CLINIC | Age: 64
End: 2019-11-22

## 2019-11-22 NOTE — TELEPHONE ENCOUNTER
Per Dr. Ward, recent thoracic spine MRI is normal. Recommend referral to pain clinic.     Discussed with patient and he voiced agreement with this plan. Referral was already placed for clinic.     Advised patient to call back with further questions/concerns.

## 2019-12-04 ENCOUNTER — HOSPITAL ENCOUNTER (OUTPATIENT)
Dept: PHYSICAL THERAPY | Facility: OTHER | Age: 64
Setting detail: THERAPIES SERIES
End: 2019-12-04
Attending: NURSE PRACTITIONER
Payer: COMMERCIAL

## 2019-12-04 PROCEDURE — 97110 THERAPEUTIC EXERCISES: CPT | Mod: GP | Performed by: PHYSICAL THERAPIST

## 2019-12-12 ENCOUNTER — OFFICE VISIT (OUTPATIENT)
Dept: FAMILY MEDICINE | Facility: OTHER | Age: 64
End: 2019-12-12
Payer: COMMERCIAL

## 2019-12-12 VITALS
SYSTOLIC BLOOD PRESSURE: 130 MMHG | TEMPERATURE: 97.9 F | DIASTOLIC BLOOD PRESSURE: 64 MMHG | HEIGHT: 68 IN | HEART RATE: 92 BPM | BODY MASS INDEX: 31.6 KG/M2 | RESPIRATION RATE: 18 BRPM | WEIGHT: 208.5 LBS

## 2019-12-12 DIAGNOSIS — J30.89 ALLERGIC RHINITIS DUE TO DUST MITE: ICD-10-CM

## 2019-12-12 DIAGNOSIS — E78.5 HYPERLIPIDEMIA, UNSPECIFIED HYPERLIPIDEMIA TYPE: ICD-10-CM

## 2019-12-12 DIAGNOSIS — J30.81 ALLERGIC RHINITIS DUE TO ANIMAL DANDER: ICD-10-CM

## 2019-12-12 DIAGNOSIS — N52.9 ERECTILE DYSFUNCTION, UNSPECIFIED ERECTILE DYSFUNCTION TYPE: ICD-10-CM

## 2019-12-12 DIAGNOSIS — Z23 NEED FOR PROPHYLACTIC VACCINATION AND INOCULATION AGAINST INFLUENZA: ICD-10-CM

## 2019-12-12 DIAGNOSIS — Z23 NEED FOR VACCINATION: ICD-10-CM

## 2019-12-12 DIAGNOSIS — Z12.11 SCREEN FOR COLON CANCER: ICD-10-CM

## 2019-12-12 DIAGNOSIS — E78.5 HYPERLIPIDEMIA LDL GOAL <100: ICD-10-CM

## 2019-12-12 DIAGNOSIS — I10 BENIGN ESSENTIAL HYPERTENSION: ICD-10-CM

## 2019-12-12 DIAGNOSIS — J30.89 PERENNIAL ALLERGIC RHINITIS: ICD-10-CM

## 2019-12-12 DIAGNOSIS — I10 ESSENTIAL HYPERTENSION, BENIGN: Primary | ICD-10-CM

## 2019-12-12 PROCEDURE — 90662 IIV NO PRSV INCREASED AG IM: CPT | Performed by: FAMILY MEDICINE

## 2019-12-12 PROCEDURE — 90670 PCV13 VACCINE IM: CPT | Performed by: FAMILY MEDICINE

## 2019-12-12 PROCEDURE — 90472 IMMUNIZATION ADMIN EACH ADD: CPT | Performed by: FAMILY MEDICINE

## 2019-12-12 PROCEDURE — 99214 OFFICE O/P EST MOD 30 MIN: CPT | Mod: 25 | Performed by: FAMILY MEDICINE

## 2019-12-12 PROCEDURE — 90471 IMMUNIZATION ADMIN: CPT | Performed by: FAMILY MEDICINE

## 2019-12-12 RX ORDER — LORATADINE 10 MG/1
10 TABLET ORAL DAILY
Qty: 90 TABLET | Refills: 3 | COMMUNITY
Start: 2019-12-12 | End: 2023-07-17

## 2019-12-12 RX ORDER — AZELASTINE 1 MG/ML
1-2 SPRAY, METERED NASAL 2 TIMES DAILY
Qty: 1 BOTTLE | Refills: 3 | Status: SHIPPED | OUTPATIENT
Start: 2019-12-12 | End: 2023-07-17

## 2019-12-12 RX ORDER — LISINOPRIL 20 MG/1
20 TABLET ORAL DAILY
Qty: 90 TABLET | Refills: 3 | Status: SHIPPED | OUTPATIENT
Start: 2019-12-12 | End: 2020-12-16

## 2019-12-12 ASSESSMENT — MIFFLIN-ST. JEOR: SCORE: 1705.12

## 2019-12-12 NOTE — ASSESSMENT & PLAN NOTE
He has a known history of hyperlipidemia but is not on a statin due to reaction to statin .  Recheck lipid panel and assess his ASCVD risk score before prescribing meds for his erectile dysfunction.  He denies any active chest pains, shortness of breath.  Follow results and treat accordingly.

## 2019-12-12 NOTE — ASSESSMENT & PLAN NOTE
Blood pressures are well controlled on the current dose of lisinopril 20 mg daily.  Recheck chemistries.  Refill of the meds sent.  Advised low-salt diet, weight loss and regular exercise to help control blood pressures better.  Recheck in 6 months for follow-up.

## 2019-12-12 NOTE — PROGRESS NOTES
Subjective     Krishna Dugan is a 64 year old male who presents to clinic today for the following health issues:    History of Present Illness        Hypertension: He presents for follow up of hypertension.  He does not check blood pressure  regularly outside of the clinic. Outpatient blood pressures have not been over 140/90. He does not follow a low salt diet.     He eats 0-1 servings of fruits and vegetables daily.He consumes 1 sweetened beverage(s) daily.  He is taking medications regularly.     Hypertension Follow-up      Do you check your blood pressure regularly outside of the clinic? No     Are you following a low salt diet? No    Are your blood pressures ever more than 140 on the top number (systolic) OR more   than 90 on the bottom number (diastolic), for example 140/90? No      How many servings of fruits and vegetables do you eat daily?  0-1    On average, how many sweetened beverages do you drink each day (Examples: soda, juice, sweet tea, etc.  Do NOT count diet or artificially sweetened beverages)?   1    How many days per week do you miss taking your medication? 0  Also would like to talk about ED- worsening problem over the past yr. Would like to have treatment        Patient Active Problem List   Diagnosis     Corneal foreign body     Essential hypertension, benign     Flushing     Hyperlipidemia     S/P colonoscopic polypectomy     Allergic rhinitis due to animal dander     Allergic rhinitis due to dust mite     Sinus pressure     Abnormal involuntary movements     Blood in stool     Gastroesophageal reflux disease without esophagitis     Other testicular hypofunction     Vitamin D deficiency     History reviewed. No pertinent surgical history.    Social History     Tobacco Use     Smoking status: Former Smoker     Packs/day: 0.50     Years: 10.00     Pack years: 5.00     Types: Cigarettes     Smokeless tobacco: Never Used   Substance Use Topics     Alcohol use: No     Comment: very little      "History reviewed. No pertinent family history.      Current Outpatient Medications   Medication Sig Dispense Refill     azelastine (ASTELIN) 0.1 % nasal spray Spray 1-2 sprays into both nostrils 2 times daily 1 Bottle 3     cetirizine (ZYRTEC) 10 MG tablet Take 10 mg by mouth daily       fluticasone (FLONASE) 50 MCG/ACT spray Spray 2 sprays into both nostrils daily 1 Bottle 11     ibuprofen (ADVIL/MOTRIN) 800 MG tablet Take 1 tablet (800 mg) by mouth every 8 hours as needed for moderate pain 30 tablet 0     lisinopril (PRINIVIL/ZESTRIL) 20 MG tablet Take 1 tablet (20 mg) by mouth daily 90 tablet 3     loratadine (CLARITIN) 10 MG tablet Take 1 tablet (10 mg) by mouth daily 90 tablet 3     omeprazole (PRILOSEC) 20 MG CR capsule Take 20 mg by mouth       VITAMIN D, CHOLECALCIFEROL, PO Take 5,000 Units by mouth daily       aspirin 81 MG chewable tablet Take 81 mg by mouth       Allergies   Allergen Reactions     Valdecoxib      Ears turn purple  Facial flushing  Bextra     BP Readings from Last 3 Encounters:   12/12/19 130/64   11/14/19 138/82   08/13/19 130/84    Wt Readings from Last 3 Encounters:   12/12/19 94.6 kg (208 lb 8 oz)   11/14/19 93.8 kg (206 lb 12.8 oz)   08/13/19 89.8 kg (198 lb)                    Reviewed and updated as needed this visit by Provider  Tobacco  Allergies  Meds  Problems  Med Hx  Surg Hx  Fam Hx         Review of Systems   ROS COMP: Constitutional, HEENT, cardiovascular, pulmonary, GI, , musculoskeletal, neuro, skin, endocrine and psych systems are negative, except as otherwise noted.      Objective    /64   Pulse 92   Temp 97.9  F (36.6  C) (Temporal)   Resp 18   Ht 1.727 m (5' 7.99\")   Wt 94.6 kg (208 lb 8 oz)   BMI 31.71 kg/m    Body mass index is 31.71 kg/m .  Physical Exam  Constitutional:       Appearance: Normal appearance.   HENT:      Head: Normocephalic and atraumatic.   Neck:      Musculoskeletal: Normal range of motion and neck supple.   Cardiovascular:     "  Rate and Rhythm: Normal rate and regular rhythm.      Pulses: Normal pulses.      Heart sounds: Normal heart sounds. No murmur. No friction rub. No gallop.    Pulmonary:      Effort: Pulmonary effort is normal. No respiratory distress.      Breath sounds: Normal breath sounds. No stridor. No wheezing, rhonchi or rales.   Chest:      Chest wall: No tenderness.   Neurological:      General: No focal deficit present.      Mental Status: He is alert and oriented to person, place, and time.   Psychiatric:         Mood and Affect: Mood normal.         Behavior: Behavior normal.         Thought Content: Thought content normal.         Judgment: Judgment normal.            Diagnostic Test Results:  Labs reviewed in Epic        Assessment & Plan   Problem List Items Addressed This Visit     Essential hypertension, benign - Primary     Blood pressures are well controlled on the current dose of lisinopril 20 mg daily.  Recheck chemistries.  Refill of the meds sent.  Advised low-salt diet, weight loss and regular exercise to help control blood pressures better.  Recheck in 6 months for follow-up.         Relevant Medications    lisinopril (PRINIVIL/ZESTRIL) 20 MG tablet    Hyperlipidemia     He has a known history of hyperlipidemia but is not on a statin due to reaction to statin .  Recheck lipid panel and assess his ASCVD risk score before prescribing meds for his erectile dysfunction.  He denies any active chest pains, shortness of breath.  Follow results and treat accordingly.         Allergic rhinitis due to animal dander    Relevant Medications    azelastine (ASTELIN) 0.1 % nasal spray    loratadine (CLARITIN) 10 MG tablet    Allergic rhinitis due to dust mite    Relevant Medications    azelastine (ASTELIN) 0.1 % nasal spray    loratadine (CLARITIN) 10 MG tablet      Other Visit Diagnoses     Screen for colon cancer        Relevant Orders    GASTROENTEROLOGY ADULT REF PROCEDURE ONLY    Perennial allergic rhinitis         "Relevant Medications    azelastine (ASTELIN) 0.1 % nasal spray    loratadine (CLARITIN) 10 MG tablet    Need for vaccination        Relevant Orders    Pneumococcal vaccine 13 valent PCV13 IM (Prevnar) [02137] (Completed)    Erectile dysfunction, unspecified erectile dysfunction type        Benign essential hypertension        Relevant Medications    lisinopril (PRINIVIL/ZESTRIL) 20 MG tablet    Other Relevant Orders    Comprehensive metabolic panel    Albumin Random Urine Quantitative with Creat Ratio    Hyperlipidemia LDL goal <100        Relevant Orders    Lipid panel reflex to direct LDL Fasting    Need for prophylactic vaccination and inoculation against influenza        Relevant Orders    INFLUENZA (HIGH DOSE) 3 VALENT VACCINE [53635] (Completed)    Vaccine Administration, Each Additional [49490] (Completed)             BMI:   Estimated body mass index is 31.71 kg/m  as calculated from the following:    Height as of this encounter: 1.727 m (5' 7.99\").    Weight as of this encounter: 94.6 kg (208 lb 8 oz).           See Patient Instructions  Return in about 6 months (around 6/12/2020).    Huong Tomas MD  [facility]    "

## 2019-12-13 ENCOUNTER — TELEPHONE (OUTPATIENT)
Dept: FAMILY MEDICINE | Facility: OTHER | Age: 64
End: 2019-12-13

## 2019-12-13 ENCOUNTER — TELEPHONE (OUTPATIENT)
Dept: OCCUPATIONAL MEDICINE | Facility: CLINIC | Age: 64
End: 2019-12-13

## 2019-12-13 ENCOUNTER — NURSE TRIAGE (OUTPATIENT)
Dept: NURSING | Facility: CLINIC | Age: 64
End: 2019-12-13

## 2019-12-13 DIAGNOSIS — E78.5 HYPERLIPIDEMIA LDL GOAL <100: ICD-10-CM

## 2019-12-13 DIAGNOSIS — I10 BENIGN ESSENTIAL HYPERTENSION: ICD-10-CM

## 2019-12-13 LAB
ALBUMIN SERPL-MCNC: 3.9 G/DL (ref 3.4–5)
ALP SERPL-CCNC: 67 U/L (ref 40–150)
ALT SERPL W P-5'-P-CCNC: 27 U/L (ref 0–70)
ANION GAP SERPL CALCULATED.3IONS-SCNC: 4 MMOL/L (ref 3–14)
AST SERPL W P-5'-P-CCNC: 18 U/L (ref 0–45)
BILIRUB SERPL-MCNC: 0.7 MG/DL (ref 0.2–1.3)
BUN SERPL-MCNC: 16 MG/DL (ref 7–30)
CALCIUM SERPL-MCNC: 9.2 MG/DL (ref 8.5–10.1)
CHLORIDE SERPL-SCNC: 104 MMOL/L (ref 94–109)
CHOLEST SERPL-MCNC: 258 MG/DL
CO2 SERPL-SCNC: 29 MMOL/L (ref 20–32)
CREAT SERPL-MCNC: 1.12 MG/DL (ref 0.66–1.25)
CREAT UR-MCNC: 232 MG/DL
GFR SERPL CREATININE-BSD FRML MDRD: 68 ML/MIN/{1.73_M2}
GLUCOSE SERPL-MCNC: 102 MG/DL (ref 70–99)
HDLC SERPL-MCNC: 37 MG/DL
LDLC SERPL CALC-MCNC: 164 MG/DL
MICROALBUMIN UR-MCNC: 41 MG/L
MICROALBUMIN/CREAT UR: 17.5 MG/G CR (ref 0–17)
NONHDLC SERPL-MCNC: 221 MG/DL
POTASSIUM SERPL-SCNC: 4.3 MMOL/L (ref 3.4–5.3)
PROT SERPL-MCNC: 8.2 G/DL (ref 6.8–8.8)
SODIUM SERPL-SCNC: 137 MMOL/L (ref 133–144)
TRIGL SERPL-MCNC: 285 MG/DL

## 2019-12-13 PROCEDURE — 80053 COMPREHEN METABOLIC PANEL: CPT | Performed by: FAMILY MEDICINE

## 2019-12-13 PROCEDURE — 36415 COLL VENOUS BLD VENIPUNCTURE: CPT | Performed by: FAMILY MEDICINE

## 2019-12-13 PROCEDURE — 82043 UR ALBUMIN QUANTITATIVE: CPT | Performed by: FAMILY MEDICINE

## 2019-12-13 PROCEDURE — 80061 LIPID PANEL: CPT | Performed by: FAMILY MEDICINE

## 2019-12-13 NOTE — TELEPHONE ENCOUNTER
Left message for patient to return call, please relay results below.  Genoveva Arteaga MA      Notes recorded by Huong Tomas MD on 12/13/2019 at 5:01 PM CST  Please inform patient that cholesterol levels are significantly elevated.  However due to his intolerance to statins I cannot do any medications for this.  Please advise low-fat diet, regular exercise to help improve this. blood chemistries including kidney and liver functions are essentially normal.  Blood glucose levels are marginally elevated.  Advised low-carb diet to help improve this.

## 2019-12-13 NOTE — TELEPHONE ENCOUNTER
Patient returning call from clinic re: lab results. Called the number that patient requested a call back to and the call went to voicemail. Left a message to call us back if he still would like to talk to a nurse or call his clinic back on Monday   [facility]

## 2020-01-21 NOTE — PROGRESS NOTES
Outpatient Physical Therapy Discharge Note     Patient: Krishna Dugan  : 1955    Beginning/End Dates of Reporting Period:  2019 to 2019  Brent has been seen for a total of 7 visits overall.    Referring Provider: Elvia Muñoz CNP    Therapy Diagnosis: Mechanical neck and back pain.      Client Self Report: Brent reports that his neck is not very good. He reports that the neck is producing pretty severe thoracic pain.     Objective Measurements:  Objective Measure: Frequency of PREP  Details: Not doing very often    Objective Measure: Effect of PREP  Details: Seems to decrease symptoms slightly during.     Treatment: Mechanical positioning for neck and back pain, posture and  education.     Goals:  Goal Identifier Symptoms   Goal Description Brent will use strategies learned in P.T. to decrease symptom levels by 50% to help reach personal goal of symptom relief.    Target Date 19   Date Met      Progress:     Goal Identifier Sleep   Goal Description Brent will sleep through the night without waking due to pain 7 days in a row, allowing for an improved night's sleep.    Target Date 10/27/19   Date Met      Progress:     Goal Identifier Function   Goal Description Brent will improve his overall functional level by 50% per. self reporting and NDI/OSCAR   Target Date 19   Date Met      Progress:     Progress Toward Goals:   Progress limited due to patient did not continue with physical therapy.    Plan:  Discharge from therapy.    Discharge: Yes    Reason for Discharge: Patient has failed to schedule further appointments.    Equipment Issued: None    Discharge Plan: Patient to continue home program.    Thank you for this referral.    Moira Austin P.T.

## 2020-01-27 NOTE — TELEPHONE ENCOUNTER
[facility] Referral      1. Confirmed address with patient? Yes  2. Confirmed phone number with patient? Yes  3. Confirmed referring provider? Yes  4. Is the PCP the same as the referring provider? Yes  5. Has the patient been to any previous pain clinics? Yes  (If yes, send [place] with welcome letter)  6. Which insurance are we to bill for this appointment?  [employer]    7. Informed pt of cancellation (48 hour) policy? Yes    REGARDING OPIOID MEDICATIONS: We will always address appropriateness of opioid pain medications, but we generally will not automatically take on a prescribing role. When we do take on prescribing of opioids for chronic pain, it is in collaboration with the referring physician for an intermediate period of time (months), with an expectation that the primary physician or provider will assume the prescribing role if medications are effective at stable doses with demonstrated compliance. Therefore, please do not assume that your prescribing responsibilities end on the day of pain clinic consultation.  8. Informed pt of prescribing policy? Yes    9.Please be aware that once you are established with a pain provider and location, you will need to continue have all future visits with that provider and location. It is best to determine what location is the most convenient for you and schedule with that one.    ** PATIENT INFORMED OF THIS POLICY Yes      9. Referring Provider: Harshil Nuñez    [facility] [place]

## 2020-01-30 NOTE — PROGRESS NOTES
[facility] Consultation      This patient is being seen in consultation at the request of his provider Dr. Ward.    Primary Care Provider is Harshil Nuñez.    Please see the [facility] health questionnaire which the patient completed and reviewed with me in detail    CHIEF COMPLAINT:  Low Back Pain    HISTORY OF PRESENT ILLNESS:  Krishna Dugan is a 64 year old male with history of low back pain.    He states that his pain started a couple of years ago. He states that he has had problems with his low back off and on for years. He reports injections for his low back about 10 years ago. He states that the pain is hurts when he gets up. He states that hurts when he touches his low back. He reports pain radiating down both of his legs. He states that when he stands he has to wait before moving. He states that the pain goes down to his knees. He states that he used to get matias horses in his legs and would do stretches and thinks he injured his feet doing these stretches. He states that the matias horses have gotten better with drinking more water. He reports occasional numbness/tingling in the feet and legs. He denies any loss of bladder or bowel control with his low back pain.        Pain Information:   Onset/Progression:  Pain started 2 years ago.   Pain quality: Aching    Pain timing: Constant     Pain rating: intensity ranges from 3/10 to 8/10, and averages 6/10 on a 0-10 scale.   Aggravating factors include: bending, sitting      Past Pain Treatments:   Pain Clinic:   Yes, one in [place] 20-30 years ago, [facility]   PT: Yes, last done for low back about 1.5 months ago  Psychologist: No  Relaxation techniques/biofeedback: No  Chiropractor: Yes  Acupuncture: No  Pharmacotherapy:    Opioids: No     Non-opioids:  Yes   TENs Unit:Yes, tried at the chiropractor, unsure if helpful  Injections: Yes, unsure what ones, they were 10+  Years ago  Self-care:   Yes, hot  showers  Surgeries related to pain: No     Current Pain Relevant Medications:    Ibuprofen 200mg-takes only if needed  Tylenol 500mg-takes 1-2/day if needed      Previous Pain Relevant Medications: (H--helped; HI--Helped initially; [facility]--Somewhat helpful; NH--No help; W--worse; SE--side effects; ?--Unsure if helpful)   NOTE: This medication information taken from patient's intake form, not medical records.   Opiates: none  NSAIDS: Ibuprofen H SE stomach upset  Anti-migraine mediations: none  Muscle Relaxants: none  Neuropathics: Gabapentin ? Was a long time ago, Lyrica SE edema  Anti-depressants: none  Anxiolytics: Xanax NH because was not needed  Topicals: Lidocaine Patches SW  Other medications not covered above: none    FAMILY MEDICAL HISTORY:  Chronic pain: No  Family history of headaches: No      PAST MEDICAL HISTORY:   Past Medical History:   Diagnosis Date     Diagnostic skin and sensitization tests 9/08 skin tests pos. for cat/dog ONLY (all other environmental allergens NEGATIVE)         HEALTH AND LIFESTYLE PRACTICES:  Have you ever had any problems with alcohol or drug use? no  Have you ever felt you should cut down your use of alcohol/drugs?no  Have people ever annoyed you by criticizing your alcohol/drug use? no  Have you ever felt bad or guilty about your alcohol/drug use? no  Have you ever had a drink or taken a drug first thing in the morning for an eye-opener/hangover? no    SLEEP:  Do you snore heavily? no  Do you wake up feeling rested? yes  How many hours of sleep do you average per day? 7  What keep you from sleep? n/a  Have you been diagnosed with sleep apnea? no  Do you wear a CPAP? no      ALLERGIES:  Allergies   Allergen Reactions     Valdecoxib      Ears turn purple  Facial flushing  Bextra       MEDICATIONS:  Current Outpatient Medications   Medication Sig Dispense Refill     aspirin 81 MG chewable tablet Take 81 mg by mouth       azelastine (ASTELIN) 0.1 % nasal spray Spray 1-2 sprays into  "both nostrils 2 times daily 1 Bottle 3     cetirizine (ZYRTEC) 10 MG tablet Take 10 mg by mouth daily       fluticasone (FLONASE) 50 MCG/ACT spray Spray 2 sprays into both nostrils daily 1 Bottle 11     ibuprofen (ADVIL/MOTRIN) 800 MG tablet Take 1 tablet (800 mg) by mouth every 8 hours as needed for moderate pain 30 tablet 0     lisinopril (PRINIVIL/ZESTRIL) 20 MG tablet Take 1 tablet (20 mg) by mouth daily 90 tablet 3     loratadine (CLARITIN) 10 MG tablet Take 1 tablet (10 mg) by mouth daily 90 tablet 3     omeprazole (PRILOSEC) 20 MG CR capsule Take 20 mg by mouth       VITAMIN D, CHOLECALCIFEROL, PO Take 5,000 Units by mouth daily           REVIEW OF SYSTEMS:   Constitutional:  Weight Gain  Eyes/Head: Negative  Ears/Nose/Throat: Hearing Loss and Ringing in Ears  Allergy/Immune: Negative  Skin:Itching  Hematologic/Lymphatic/Immunologic:Negative  Respiratory: Negative  Cardiovascular: Swelling in feet  Gastrointestinal: Negative  Endocrine: Negative  Musculoskeletal: Joint pain and Stiffness  Urinary:  Negative   Any bowel or bladder incontinence: Denies   Neurologic: Numbness/Tingling and Weakness  Psychiatric: Negative    CURRENT FAMILY/SOCIAL SITUATION:  Living situation: Patient is . He lives with his spouse.   Support system: He reports getting just the right amount of support  Occupation: He works as a        ABUSE/ASSAULT HISTORY:   Physical: no  Emotional:  no  Sexual: no  Childhood Sexual Abuse: no    SUBSTANCE USE:  Any illicit drug use: none  EtOH use: very rarely  Caffeine use: drinks pop, coffee  Nicotine use: none  Any use of prescriptions other than how they were prescribed: none    PHYSICAL EXAM    Vitals: /78 (BP Location: Left arm, Patient Position: Sitting, Cuff Size: Adult Large)   Pulse 92   Ht 1.727 m (5' 7.99\")   Wt 92.8 kg (204 lb 8 oz)   SpO2 97%   BMI 31.10 kg/m        Appearance:     A&O. Patient is appropriate.   Patient is in NAD.   Patient is well groomed " and appears stated age.     HEENT:   Normocephalic, atraumatic, sclera, conjunctiva and pharynx normal. Pupils are equal, round and reactive to light. Hearing is adequate for exam. Uvula rises with phonation.   Neck: Supple. No deformities or adenopathy  Cardiovascular:  Heart has a regular rate and rhythm. Audible S1 and S2. No murmurs auscultated. No edema on bilateral lower extremities.   Respiratory: Lungs are clear to auscultation bilaterally. No wheezes or crackles.  Skin:  No rashes, erythema, breakdowns, lesions to exposed skin.   Hematologic:  No bruises, petechiae or ecchymosis to exposed areas.  Psychiatric:  mentation appears normal., affect and mood normal  Musculoskeletal:  Posture upright, shoulders and pelvis are leveled.   Deltoid: R: 5/5 L: 5/5  Biceps: R: 5/5 L: 5/5  Triceps: R: 5/5 L: 5/5  Intrinsic hand: R: 5/5   L: 5/5  Hip flexion: R: 5/5 L: 5/5  Knee ext: R: 5/5 L: 5/5  Knee flex: R: 5/5 L: 5/5  Dorsiflexion: R: 5/5 L: 5/5  Plantarflexion: R: 5/5 L: 5/5    Cervical spine:   Flex:  30 degrees, painful    Ext: 30 degrees, painful    Rotation to right: 80 degrees, painful    Rotation to left: 80 degrees, painful    Tenderness in the cervical spine at midline. No   Tenderness in the cervical paraspinal muscles. No  Thoracic spine:    Tenderness in the thoracic spine at midline. No   Tenderness in the thoracic paraspinal muscles. No  Lumbar/Sacral spine:   Flexion:  90 degrees, painful    Ext: 25 degrees, painful    Rotation/ext to right: painful    Rotation/ext to left: painful    Tenderness in the lumbar spine at midline. Yes   Tenderness in the lumbar paraspinal muscles. No   Straight leg exam:    Right: negative    Left: negative   Tenderness over SI joint:      Right: negative     Left:  negative      Gait pattern:  Able to walk on the heels and toes. Patient has a normal gait.     Neurological:   Deep Tendon Reflex exam:   Biceps:     R:  2/4   L: 2/4   Brachioradialis   R:  2/4   L:  2/4:   Patella:  R:  2/4   L: 2/4   Achilles:  R:  2/4   L: 2/4    Sensory exam:   Light touch: normal bilateral upper and lower extremities    Vibration: normal in bilateral upper extremities and bilateral lower extremities   Sharp: normal in bilateral upper extremities and bilateral lower extremities   No allodynia, dysesthesia, or hyperalgesia.    Previous Diagnostic Tests:   Imaging Studies:   MRI THORACIC SPINE WITHOUT CONTRAST  11/21/2019 5:49 PM   IMPRESSION:    1. Unremarkable thoracic spine MR. No significant degenerative changes  or disc herniations. No spinal canal or neural foraminal narrowing. No  abnormal signal within the visualized spinal cord.  2. Degenerative changes partially visualized in the cervical spine.    MRI lumbar spine 10/18/2019  IMPRESSION:  1. Mild multilevel degenerative disc disease and facet arthropathy of  the lumbar spine without significant spinal stenosis. There is mild  neural foraminal stenosis at multiple levels, as detailed above.  2. Incompletely evaluated T2/STIR hyperintense lesion seen on the  sagittal images in the posterior right upper quadrant of the  abdomen/retroperitoneal region. It is possible that this could be a  very medially positioned/rotated superior pole of the right kidney,  but I suspect that it could represent an adrenal mass or exophytic  superior pole renal lesion/mass. Recommend CT of the abdomen and  pelvis for further characterization.      [place] Board of Pharmacy Data Base Reviewed:    YES; No concern for abuse or misuse of controlled medications based on this report.       ASSESSMENT:   Krishna Dugan is a 64 year old male who presents today with the complaints of:    1. Chronic bilateral low back pain with bilateral sciatica   2. Myofascial pain  3. Bilateral plantar fasciitis     We discussed a multidisciplinary approach to pain management today.  This included physical therapy, behavioral health, medication management, and injection  therapy.  We talked about the difference between opiate and nonopiate pain medications.  We discussed the risks associated with opiate pain medications including tolerance, physical dependency, and addiction.  We talked about the different types of nonopiate pain medications which include anti-inflammatories, antidepressants, muscle relaxants, topical agents, and neuropathic pain medications.    We discussed utilizing as needed medications such as anti-inflammatories and muscle relaxants.  With the patient current job a muscle relaxant would need to be used with caution as he cannot have any sedation or dizziness he does have a hiatal hernia which has been controlled with omeprazole.  We could consider an anti-inflammatory but would not want to use it daily.  We did talk about utilizing injection therapy first and waiting on medications and he would like to proceed with that.    He has not recently attended physical therapy.  I recommend that he continue the exercises.  We certainly could have him evaluated with a different physical therapist if he felt he would like to get other suggestions for stretches and exercises that he could do at home.  He is also reporting some plantar fasciitis.  We talked about utilizing a tennis ball on his foot and potentially see podiatry in the future.    PLAN:    Diagnosis reviewed, treatment option addressed, and risk/benefits discussed.  Self-care instructions given.  I am recommending a multidisciplinary treatment plan to help this patient better manage his pain.       1. Physical Therapy:  Continue current exercises, we could have him evaluated at [facility] in future.   2. Clinical Health Psychologist to address issues of relaxation, behavorial change, coping style, and other factors important to improvement: not at this tiem  3. Diagnostic Studies:  None at this time  4. Medication Management: we are going to avoid using medications at this time. Certainly we could consider  Gabapentin or Cymbalta in the future   5. Potential procedures: referred for LESI  6. Recommendations to PCP: see above    Follow up: 2 weeks after injection     TIME SPENT:   A total of 45  minutes was spent on the patient today, greater than 50% of that time was spent on face to face counseling and care coordination regarding diagnoses and treatment options such as medication options, injection therapy, and physical therapy.    I would like to thank Dr. Ward for allowing me to participate in the management of this patient.     Johnna Kendall PA-C  [facility] [place]

## 2020-02-04 ENCOUNTER — OFFICE VISIT (OUTPATIENT)
Dept: PALLIATIVE MEDICINE | Facility: OTHER | Age: 65
End: 2020-02-04
Payer: COMMERCIAL

## 2020-02-04 VITALS
SYSTOLIC BLOOD PRESSURE: 128 MMHG | DIASTOLIC BLOOD PRESSURE: 78 MMHG | HEIGHT: 68 IN | OXYGEN SATURATION: 97 % | WEIGHT: 204.5 LBS | HEART RATE: 92 BPM | BODY MASS INDEX: 30.99 KG/M2

## 2020-02-04 DIAGNOSIS — G89.29 CHRONIC BILATERAL LOW BACK PAIN WITH BILATERAL SCIATICA: Primary | ICD-10-CM

## 2020-02-04 DIAGNOSIS — M79.18 MYOFASCIAL PAIN: ICD-10-CM

## 2020-02-04 DIAGNOSIS — M72.2 PLANTAR FASCIITIS: ICD-10-CM

## 2020-02-04 DIAGNOSIS — M54.42 CHRONIC BILATERAL LOW BACK PAIN WITH BILATERAL SCIATICA: Primary | ICD-10-CM

## 2020-02-04 DIAGNOSIS — M54.41 CHRONIC BILATERAL LOW BACK PAIN WITH BILATERAL SCIATICA: Primary | ICD-10-CM

## 2020-02-04 PROCEDURE — 99207 ZZC DOWN CODE DUE TO SUBSEQUENT EXAM: CPT | Performed by: PHYSICIAN ASSISTANT

## 2020-02-04 PROCEDURE — 99214 OFFICE O/P EST MOD 30 MIN: CPT | Performed by: PHYSICIAN ASSISTANT

## 2020-02-04 ASSESSMENT — PAIN SCALES - GENERAL: PAINLEVEL: SEVERE PAIN (7)

## 2020-02-04 ASSESSMENT — MIFFLIN-ST. JEOR: SCORE: 1686.95

## 2020-02-04 NOTE — PATIENT INSTRUCTIONS
After Visit Instructions:     Thank you for coming to [facility] [place] for your care. It is my goal to partner with you to help you reach your optimal state of health.     I am recommending multidisciplinary care at this time.  The focus of care will be to continue gradual rehabilitation and pain management with medication adjustments as needed.    Continue daily self-care, identifying contributing factors, and monitoring variations in pain level. Continue to integrate self-care into your life.        Schedule physical therapy assessment/visit: continue your current exercises    Schedule follow-up with Johnna Kendall PA-C 2 weeks after your injection. You will need to make this appointment.     Procedures recommended: consider a lumbar epidural steroid injection (approach would be coming from the middle) versus a lumbar transforaminal epidural steroid injection (approach would be coming from the sides). We can consider facet mediated injections (focusing on the facet joints, either starting with steroid medicine or numbing medicine and then the burning procedure) in the future      Johnna Kendlal PA-C  [facility]    Contact information: [facility]  Clinic Number:  [phone]     Call with any questions about your care and for scheduling assistance.     Calls are returned Monday through Friday between 8 AM and 4:30 PM. We usually get back to you within 2 business days depending on the issue/request.    If we are prescribing your medications:    For opioid medication refills, call the clinic or send a [employer] message 7 days in advance.  Please include:    Name of requested medication    Name of the pharmacy.    For non-opioid medications, call your pharmacy directly to request a refill. Please allow 3-4 days to be processed.     Per [place] State Law:    All controlled substance prescriptions must be filled within 30 days of being  written.      For those controlled substances allowing refills, pickup must occur within 30 days of last fill.      We believe regular attendance is key to your success in our program!      Any time you are unable to keep your appointment we ask that you call us at least 24 hours in advance to cancel.This will allow us to offer the appointment time to another patient.   Multiple missed appointments may lead to dismissal from the clinic.

## 2020-02-21 ENCOUNTER — ANCILLARY PROCEDURE (OUTPATIENT)
Dept: RADIOLOGY | Facility: CLINIC | Age: 65
End: 2020-02-21
Attending: PAIN MEDICINE
Payer: COMMERCIAL

## 2020-02-21 ENCOUNTER — RADIOLOGY INJECTION OFFICE VISIT (OUTPATIENT)
Dept: PALLIATIVE MEDICINE | Facility: CLINIC | Age: 65
End: 2020-02-21
Payer: COMMERCIAL

## 2020-02-21 VITALS — DIASTOLIC BLOOD PRESSURE: 70 MMHG | HEART RATE: 76 BPM | SYSTOLIC BLOOD PRESSURE: 135 MMHG

## 2020-02-21 DIAGNOSIS — M54.16 LUMBAR RADICULOPATHY: ICD-10-CM

## 2020-02-21 DIAGNOSIS — M54.16 LUMBAR RADICULOPATHY: Primary | ICD-10-CM

## 2020-02-21 PROCEDURE — 62323 NJX INTERLAMINAR LMBR/SAC: CPT | Performed by: PAIN MEDICINE

## 2020-02-21 ASSESSMENT — PAIN SCALES - GENERAL: PAINLEVEL: MILD PAIN (3)

## 2020-02-21 NOTE — PROGRESS NOTES
Pre procedure Diagnosis: Lumbar radiculopathy  Post procedure Diagnosis: Same  Procedure performed: L4-5 interlaminar epidural steroid injection   Anesthesia: None  Complications: None  Operators: Pepe Bowie MD     Indications:   Krishna Dugan is a 64 year old male.  The patient has a history of left greater than right low back pain radiating to his lower extremity.  Examination shows neg slump.  he has tried conservative treatment including meds.    MRI   T12-L1: No significant posterior disc bulge or protrusion. Normal  facets. No spinal or neural foraminal stenosis.     L1-L2: Shallow symmetric disc bulge with tiny annular fissure in the  left posterior central region but no disc herniation. Normal facets.  No spinal or neural foraminal stenosis.     L2-L3: Shallow bilobed disc bulge with mild facet arthropathy. No  spinal or neural foraminal stenosis.     L3-L4: Small symmetric disc bulge without significant disc herniation.  Mild facet arthropathy. No significant spinal stenosis. There is mild  left neural foraminal stenosis. The right neural foramen is patent.     L4-L5: Small symmetric disc bulge that is slightly more protuberant in  the posterior central region. Mild facet arthropathy. No significant  spinal stenosis. There is minimal left neural foraminal narrowing. The  right neural foramen is patent.     L5-S1: Small symmetric disc bulge with mild facet arthropathy. No  spinal or neural foraminal stenosis.                                                                      IMPRESSION:  1. Mild multilevel degenerative disc disease and facet arthropathy of  the lumbar spine without significant spinal stenosis. There is mild  neural foraminal stenosis at multiple levels, as detailed above.  2. Incompletely evaluated T2/STIR hyperintense lesion seen on the  sagittal images in the posterior right upper quadrant of the  abdomen/retroperitoneal region. It is possible that this could be a  very  medially positioned/rotated superior pole of the right kidney,  but I suspect that it could represent an adrenal mass or exophytic  superior pole renal lesion/mass. Recommend CT of the abdomen and  pelvis for further characterization.     Options/alternatives, benefits and risks were discussed with the patient including but not limited to bleeding, infection, no pain relief, tissue trauma, exposure to radiation, reaction to medications, spinal cord injury, dural puncture, weakness, numbness and headache.  Questions were answered to his satisfaction and he wishes to proceed. Voluntary informed consent was obtained and signed.     Vitals were reviewed: Yes  Allergies were reviewed:  Yes   Medications were reviewed:  Yes   Pre-procedure pain score: 9/10    Procedure:  The patient's medical history, medications, and allergies were reviewed and reconciled.  After obtaining signed informed consent, the patient was brought into the procedure suite and was placed in a prone position on the procedure table.   A Pause for the Cause was performed.  Patient was prepped and draped in the usual sterile fashion.     The L4-5 interspace was identified with AP fluoroscopy.  A total of 4ml of 1% lidocaine was used to anesthetize the skin and subcutaneous tissues for a left midline approach.    A 20gauge 3.5inch Touhy needle was advanced utilizing intermittent AP and Lateral fluoroscopy and air for loss of resistance.  The epidural space was encountered on the first pass without difficulties.  Aspiration for blood and CSF was negative.  Needle position was verified by injecting 1 ml of Omnipaque utilizing real-time fluoroscopy that showed good needle placement and epidural spread without signs of intravascular or intrathecal uptake.  Omnipaque wasted:  9 ml.    Then, after repeated negative aspiration for blood or CSF, a combination of Kenalog 40 mg, Bupivicaine 0.25% 2 ml, diluted with 3 ml of normal saline to a total injectate  volume of 6 ml was injected into the epidural space in a slow and incremental fashion and the needle was restyletted and withdrawn.  All injected medications were preservative free.    The injection site was cleaned and a sterile dressing was applied.    The patient tolerated the procedure well without complications and was taken to the recovery room for continued observation.    Images were saved to PACS.    Post-procedure pain score: 5/10  Follow-up includes:   -f/u phone call in one week  -f/u with referring provider     Pepe Bowie MD  [facility] [place]

## 2020-02-21 NOTE — NURSING NOTE
Discharge Information    IV Discontiued Time:  NA    Amount of Fluid Infused:  NA    Discharge Criteria = When patient returns to baseline or as per MD order    Consciousness:  Pt is fully awake    Circulation:  BP +/- 20% of pre-procedure level    Respiration:  Patient is able to breathe deeply    O2 Sat:  Patient is able to maintain O2 Sat >92% on room air    Activity:  Moves 4 extremities on command    Ambulation:  Patient is able to stand and walk or stand and pivot into wheelchair    Dressing:  Clean/dry or No Dressing    Notes:   Discharge instructions and AVS given to patient    Patient meets criteria for discharge?  YES    Admitted to PCU?  No    Responsible adult present to accompany patient home?  Yes    Signature/Title:    jay nj RN  RN Care Coordinator  [facility] [place]

## 2020-02-21 NOTE — NURSING NOTE
Pre-procedure Intake    Have you been fasting? NA    If yes, for how long? N/A    Are you taking a prescribed blood thinner such as coumadin, Plavix, Xarelto?    No    If yes, when did you take your last dose? N/A    Do you take aspirin?  No    If cervical procedure, have you held aspirin for 6 days?   No     Do you have any allergies to contrast dye, iodine, steroid and/or numbing medications?  NO    Are you currently taking antibiotics or have an active infection?  NO    Have you had a fever/elevated temperature within the past week? NO    Are you currently taking oral steroids? NO    Do you have a ? Yes       Are you pregnant or breastfeeding?  Not Applicable    Are the vital signs normal?  Yes    Carlo Torres MA

## 2020-02-21 NOTE — PATIENT INSTRUCTIONS
[facility]   Procedure Discharge Instructions    Today you saw: Dr. Pepe Bowie      You had an:  Epidural steroid injection   -lumbar  Medications used:  Lidocaine   Bupivacaine    Omnipaque   Kenalog     Normal saline          Be cautious when walking. Numbness and/or weakness in the lower extremities may occur for up to 6-8 hours after the procedure due to effect of the local anesthetic    Do not drive for 6 hours. The effect of the local anesthetic could slow your reflexes.     You may resume your regular activities after 24 hours    Avoid strenuous activity for the first 24 hours    You may shower, however avoid swimming, tub baths or hot tubs for 24 hours following your procedure    You may have a mild to moderate increase in pain for several days following the injection.    It may take up to 14 days for the steroid medication to start working although you may feel the effect as early as a few days after the procedure.       You may use ice packs for 10-15 minutes, 3 to 4 times a day at the injection site for comfort    Do not use heat to painful areas for 6 to 8 hours. This will give the local anesthetic time to wear off and prevent you from accidentally burning your skin.     Unless you have been directed to avoid the use of anti-inflammatory medications (NSAIDS), you may use medications such as ibuprofen, Aleve or Tylenol for pain control if needed.     If you were fasting, you may resume your normal diet and medications after the procedure    If you have diabetes, check your blood sugar more frequently than usual as your blood sugar may be higher than normal for 10-14 days following a steroid injection. Contact your doctor who manages your diabetes if your blood sugar is higher than usual    Possible side effects of steroids that you may experience include flushing, elevated blood pressure, increased appetite, mild headaches and restlessness.  All of these symptoms will  get better with time.    If you experience any of the following, call the Pain Clinic during work hours (Mon-Friday 8-4:30 pm) at [phone] or the Provider Line after hours at [phone]:  -Fever over 100 degree F  -Swelling, bleeding, redness, drainage, warmth at the injection site  -Progressive weakness or numbness in your legs or arms  -Loss of bowel or bladder function  -Unusual headache that is not relieved by Tylenol or other pain reliever  -Unusual new onset of pain that is not improving

## 2020-11-14 ENCOUNTER — HEALTH MAINTENANCE LETTER (OUTPATIENT)
Age: 65
End: 2020-11-14

## 2020-12-13 DIAGNOSIS — I10 BENIGN ESSENTIAL HYPERTENSION: ICD-10-CM

## 2020-12-16 RX ORDER — LISINOPRIL 20 MG/1
TABLET ORAL
Qty: 90 TABLET | Refills: 0 | Status: SHIPPED | OUTPATIENT
Start: 2020-12-16 | End: 2021-03-15

## 2020-12-16 NOTE — TELEPHONE ENCOUNTER
Pending Prescriptions:                       Disp   Refills    lisinopril (ZESTRIL) 20 MG tablet [Pharma*90 tab*0            Sig: TAKE 1 TABLET(20 MG) BY MOUTH DAILY    Medication is being filled for 1 time radha refill only due to:  Patient is due for annual med check with labs    Please call and help schedule.  Thank you!

## 2021-01-25 ENCOUNTER — THERAPY VISIT (OUTPATIENT)
Dept: PHYSICAL THERAPY | Facility: CLINIC | Age: 66
End: 2021-01-25
Payer: OTHER MISCELLANEOUS

## 2021-01-25 DIAGNOSIS — M25.512 ACUTE PAIN OF BOTH SHOULDERS: Primary | ICD-10-CM

## 2021-01-25 DIAGNOSIS — M25.511 ACUTE PAIN OF BOTH SHOULDERS: Primary | ICD-10-CM

## 2021-01-25 PROCEDURE — 97161 PT EVAL LOW COMPLEX 20 MIN: CPT | Mod: GP | Performed by: PHYSICAL THERAPIST

## 2021-01-25 PROCEDURE — 97110 THERAPEUTIC EXERCISES: CPT | Mod: GP | Performed by: PHYSICAL THERAPIST

## 2021-01-25 NOTE — PROGRESS NOTES
"[facility] Initial Evaluation  1/25/2021     Precautions/Restrictions/MD instructions: E&T up to 8 visits    Therapist Assessment: Krishna Dugan is a 65 year old male patient presenting to Physical Therapy with B shoulder pain. Patient demonstrates slight dec in ROM, dec in strength, and localized tissue irratibility. Signs and symptoms are consistent with B shoulder strain. These impairments limit their ability to lift objects, reach out the side, overhead, and behind the back . Skilled PT services are necessary in order to reduce impairments and improve independent function.     Subjective:   Chief Complaint: B Shoulder Strain R>L  Associated symptoms: pain  Onset date: 1/8/21  CAYLA: traumatic vs insidious - his feet slipped out from underneath him while he was working on a machine; used his arms to catch himself which \"jolted\" his shoulders  Pain severity: 0/10 at rest; 5/10 current (R), 6/10 worst (when reaching out to the side)  Location of pain: lateral deltoid area  Quality of Pain: sharp    Better: rest, ice, Ibuprofen  Worse:  Reaching out to the side, lifting any weight  Time of day: morning  Progression of Symptoms since onset:  Since onset, these symptoms are stable  Previous treatment: has included none; effect was n/a (had PT in the past for low back which was not successful)  Current Functional Status: unable to get assistance at work  Previous Functional Status:  fully functional prior to pain onset/injury.  SPADI score: 51.54      General health as reported by patient: good.    PMH: fibromyalgia, high blood pressure   History of neck pain: yes - last saw PCP 9/2  Surgical history/trauma: None. He denies any significant current illness or recent hospital admissions. He denies any regional implanted devices.  Imaging: none  Medications: anti-inflammatory, high blood pressure    Occupation:  Job duties: lifting, carrying; operating a machine, assembly, " prolonged standing, pushing, pulling; needs to be able to lift 50lbs, but frequently lifts 35-40lbs (around 6x/day)  Current exercise regimen/Recreational activities: fishing  Barriers to treatment: none    Patient's Goal(s): pt's biggest fear is that he tore something - he's retiring in a month; wants to be able to fish    OBJECTIVE  Cervical Screen:   ROM: dec ROM globally, but pt says this feels typical       Shoulder: + if reproductive of patient's primary complaint   PROM L PROM R AROM L AROM R MMT L MMT R   Flex 180deg* 180deg* 156deg 160deg 4/5* 4/5*   Abd 170deg 170deg 160deg* 154deg* 4/5* 4/5*   Extension   60deg 60deg 5/5 5/5   Adduction         IR 85deg 85deg 70deg 70deg 4+/5 4+/5   ER 80deg* 80deg* 64deg* 78deg* 4/5* 4/5*   Ext/IR   T10* T10*     Lower trap     4+/5 4+/5   Middle trap     4+/5 4+/5       Special tests:   L R   Impingement     Neer's + +   Hawkin's-Khanh     Cross-over test     Painful Arc of Motion + +   Internal impingement test     Pain with resisted ER + +   Pull Test     Labral     [place]'s     Crank     Instability     Apprehension (anterior)     Relocation (anterior)     Anterior Load & Shift     Posterior Load & Shift     Multi-Directional Instability      Sulcus     Biceps      Speed's     Rotator Cuff Tear     Drop Arm - -   Belly Press + +   Lift off  - -     GH Mobility  L  R   Posterior glide     Inferior glide     Anterior glide       Palpation: TTP B supraspinatus tendon, B teres minor/infraspinatus tendon, TTP B greater tuberosity      ASSESSMENT/PLAN  Patient is a 65 year old male with both sides shoulder complaints.    Patient has the following significant findings with corresponding treatment plan.                Diagnosis 1:  B shoulder pain    Pain -  hot/cold therapy, US, electric stimulation, manual therapy, self management, education and home program  Decreased ROM/flexibility - manual therapy, therapeutic exercise and home program  Decreased strength -  therapeutic exercise, therapeutic activities and home program  Impaired posture - neuro re-education and home program    Therapy Evaluation Codes:   1) History comprised of:   Personal factors that impact the plan of care:      Age and Profession.    Comorbidity factors that impact the plan of care are:      Fibromyalgia and High blood pressure.     Medications impacting care: Anti-inflammatory and High blood pressure.  2) Examination of Body Systems comprised of:   Body structures and functions that impact the plan of care:      Shoulder.   Activity limitations that impact the plan of care are:      Lifting and Working.  3) Clinical presentation characteristics are:   Stable/Uncomplicated.  4) Decision-Making    Low complexity using standardized patient assessment instrument and/or measureable assessment of functional outcome.  Cumulative Therapy Evaluation is: Low complexity.    Previous and current functional limitations:  (See Goal Flow Sheet for this information)    Short term and Long term goals: (See Goal Flow Sheet for this information)     Communication ability:  Patient appears to be able to clearly communicate and understand verbal and written communication and follow directions correctly.  Treatment Explanation - The following has been discussed with the patient:   RX ordered/plan of care  Anticipated outcomes  Possible risks and side effects  This patient would benefit from PT intervention to resume normal activities.   Rehab potential is good.    Frequency:  1 X week, once daily  Duration:  for 8 weeks  Discharge Plan:  Achieve all LTG.  Independent in home treatment program.  Reach maximal therapeutic benefit.    Please refer to the daily flowsheet for treatment today, total treatment time and time spent performing 1:1 timed codes.

## 2021-02-01 ENCOUNTER — THERAPY VISIT (OUTPATIENT)
Dept: PHYSICAL THERAPY | Facility: CLINIC | Age: 66
End: 2021-02-01
Payer: OTHER MISCELLANEOUS

## 2021-02-01 DIAGNOSIS — M25.511 ACUTE PAIN OF BOTH SHOULDERS: ICD-10-CM

## 2021-02-01 DIAGNOSIS — M25.512 ACUTE PAIN OF BOTH SHOULDERS: ICD-10-CM

## 2021-02-01 PROCEDURE — 97110 THERAPEUTIC EXERCISES: CPT | Mod: GP | Performed by: PHYSICAL THERAPIST

## 2021-02-08 ENCOUNTER — THERAPY VISIT (OUTPATIENT)
Dept: PHYSICAL THERAPY | Facility: CLINIC | Age: 66
End: 2021-02-08
Payer: OTHER MISCELLANEOUS

## 2021-02-08 DIAGNOSIS — M25.511 ACUTE PAIN OF BOTH SHOULDERS: ICD-10-CM

## 2021-02-08 DIAGNOSIS — M25.512 ACUTE PAIN OF BOTH SHOULDERS: ICD-10-CM

## 2021-02-08 PROCEDURE — 97530 THERAPEUTIC ACTIVITIES: CPT | Mod: GP | Performed by: PHYSICAL THERAPIST

## 2021-02-08 PROCEDURE — 97110 THERAPEUTIC EXERCISES: CPT | Mod: GP | Performed by: PHYSICAL THERAPIST

## 2021-02-15 ENCOUNTER — THERAPY VISIT (OUTPATIENT)
Dept: PHYSICAL THERAPY | Facility: CLINIC | Age: 66
End: 2021-02-15
Payer: OTHER MISCELLANEOUS

## 2021-02-15 DIAGNOSIS — M25.512 ACUTE PAIN OF BOTH SHOULDERS: ICD-10-CM

## 2021-02-15 DIAGNOSIS — M25.511 ACUTE PAIN OF BOTH SHOULDERS: ICD-10-CM

## 2021-02-15 PROCEDURE — 97530 THERAPEUTIC ACTIVITIES: CPT | Mod: GP | Performed by: PHYSICAL THERAPIST

## 2021-02-15 PROCEDURE — 97110 THERAPEUTIC EXERCISES: CPT | Mod: GP | Performed by: PHYSICAL THERAPIST

## 2021-02-15 PROCEDURE — 97140 MANUAL THERAPY 1/> REGIONS: CPT | Mod: GP | Performed by: PHYSICAL THERAPIST

## 2021-02-22 ENCOUNTER — THERAPY VISIT (OUTPATIENT)
Dept: PHYSICAL THERAPY | Facility: CLINIC | Age: 66
End: 2021-02-22
Payer: OTHER MISCELLANEOUS

## 2021-02-22 DIAGNOSIS — M25.512 ACUTE PAIN OF BOTH SHOULDERS: ICD-10-CM

## 2021-02-22 DIAGNOSIS — M25.511 ACUTE PAIN OF BOTH SHOULDERS: ICD-10-CM

## 2021-02-22 PROCEDURE — 97110 THERAPEUTIC EXERCISES: CPT | Mod: GP | Performed by: PHYSICAL THERAPIST

## 2021-02-22 PROCEDURE — 97140 MANUAL THERAPY 1/> REGIONS: CPT | Mod: GP | Performed by: PHYSICAL THERAPIST

## 2021-03-01 ENCOUNTER — THERAPY VISIT (OUTPATIENT)
Dept: PHYSICAL THERAPY | Facility: CLINIC | Age: 66
End: 2021-03-01
Payer: OTHER MISCELLANEOUS

## 2021-03-01 DIAGNOSIS — M25.511 ACUTE PAIN OF BOTH SHOULDERS: ICD-10-CM

## 2021-03-01 DIAGNOSIS — M25.512 ACUTE PAIN OF BOTH SHOULDERS: ICD-10-CM

## 2021-03-01 PROCEDURE — 97530 THERAPEUTIC ACTIVITIES: CPT | Mod: GP | Performed by: PHYSICAL THERAPIST

## 2021-03-01 PROCEDURE — 97110 THERAPEUTIC EXERCISES: CPT | Mod: GP | Performed by: PHYSICAL THERAPIST

## 2021-03-01 PROCEDURE — 97140 MANUAL THERAPY 1/> REGIONS: CPT | Mod: GP | Performed by: PHYSICAL THERAPIST

## 2021-03-15 ENCOUNTER — OFFICE VISIT (OUTPATIENT)
Dept: FAMILY MEDICINE | Facility: OTHER | Age: 66
End: 2021-03-15
Payer: COMMERCIAL

## 2021-03-15 ENCOUNTER — THERAPY VISIT (OUTPATIENT)
Dept: PHYSICAL THERAPY | Facility: CLINIC | Age: 66
End: 2021-03-15
Payer: OTHER MISCELLANEOUS

## 2021-03-15 VITALS
BODY MASS INDEX: 30.92 KG/M2 | DIASTOLIC BLOOD PRESSURE: 84 MMHG | HEIGHT: 68 IN | OXYGEN SATURATION: 99 % | HEART RATE: 87 BPM | WEIGHT: 204 LBS | TEMPERATURE: 96.7 F | SYSTOLIC BLOOD PRESSURE: 132 MMHG

## 2021-03-15 DIAGNOSIS — H71.92 CHOLESTEATOMA, LEFT: ICD-10-CM

## 2021-03-15 DIAGNOSIS — Z12.5 SCREENING FOR PROSTATE CANCER: ICD-10-CM

## 2021-03-15 DIAGNOSIS — Z12.11 SCREEN FOR COLON CANCER: ICD-10-CM

## 2021-03-15 DIAGNOSIS — I10 BENIGN ESSENTIAL HYPERTENSION: ICD-10-CM

## 2021-03-15 DIAGNOSIS — G25.0 ESSENTIAL TREMOR: ICD-10-CM

## 2021-03-15 DIAGNOSIS — E78.5 HYPERLIPIDEMIA, UNSPECIFIED HYPERLIPIDEMIA TYPE: ICD-10-CM

## 2021-03-15 DIAGNOSIS — M25.511 ACUTE PAIN OF BOTH SHOULDERS: ICD-10-CM

## 2021-03-15 DIAGNOSIS — E78.1 HYPERTRIGLYCERIDEMIA: ICD-10-CM

## 2021-03-15 DIAGNOSIS — Z00.01 ENCOUNTER FOR ROUTINE ADULT MEDICAL EXAM WITH ABNORMAL FINDINGS: Primary | ICD-10-CM

## 2021-03-15 DIAGNOSIS — M25.512 ACUTE PAIN OF BOTH SHOULDERS: ICD-10-CM

## 2021-03-15 DIAGNOSIS — R37 SEXUAL DYSFUNCTION: ICD-10-CM

## 2021-03-15 PROBLEM — G89.29 CHRONIC RIGHT-SIDED LOW BACK PAIN WITHOUT SCIATICA: Status: ACTIVE | Noted: 2020-09-02

## 2021-03-15 PROBLEM — K92.1 BLOOD IN STOOL: Status: RESOLVED | Noted: 2019-03-15 | Resolved: 2021-03-15

## 2021-03-15 PROBLEM — M54.50 CHRONIC RIGHT-SIDED LOW BACK PAIN WITHOUT SCIATICA: Status: ACTIVE | Noted: 2020-09-02

## 2021-03-15 PROBLEM — J34.89 SINUS PRESSURE: Status: RESOLVED | Noted: 2018-03-06 | Resolved: 2021-03-15

## 2021-03-15 LAB
ALBUMIN SERPL-MCNC: 4.2 G/DL (ref 3.4–5)
ALP SERPL-CCNC: 64 U/L (ref 40–150)
ALT SERPL W P-5'-P-CCNC: 48 U/L (ref 0–70)
ANION GAP SERPL CALCULATED.3IONS-SCNC: 5 MMOL/L (ref 3–14)
AST SERPL W P-5'-P-CCNC: 32 U/L (ref 0–45)
BILIRUB SERPL-MCNC: 0.4 MG/DL (ref 0.2–1.3)
BUN SERPL-MCNC: 13 MG/DL (ref 7–30)
CALCIUM SERPL-MCNC: 9.5 MG/DL (ref 8.5–10.1)
CHLORIDE SERPL-SCNC: 104 MMOL/L (ref 94–109)
CHOLEST SERPL-MCNC: 348 MG/DL
CO2 SERPL-SCNC: 28 MMOL/L (ref 20–32)
CREAT SERPL-MCNC: 0.93 MG/DL (ref 0.66–1.25)
ERYTHROCYTE [DISTWIDTH] IN BLOOD BY AUTOMATED COUNT: 14.8 % (ref 10–15)
GFR SERPL CREATININE-BSD FRML MDRD: 84 ML/MIN/{1.73_M2}
GLUCOSE SERPL-MCNC: 109 MG/DL (ref 70–99)
HCT VFR BLD AUTO: 44 % (ref 40–53)
HDLC SERPL-MCNC: 42 MG/DL
HGB BLD-MCNC: 14.5 G/DL (ref 13.3–17.7)
LDLC SERPL CALC-MCNC: ABNORMAL MG/DL
LDLC SERPL DIRECT ASSAY-MCNC: 138 MG/DL
MCH RBC QN AUTO: 28.2 PG (ref 26.5–33)
MCHC RBC AUTO-ENTMCNC: 33 G/DL (ref 31.5–36.5)
MCV RBC AUTO: 86 FL (ref 78–100)
NONHDLC SERPL-MCNC: 306 MG/DL
PLATELET # BLD AUTO: 304 10E9/L (ref 150–450)
POTASSIUM SERPL-SCNC: 4.7 MMOL/L (ref 3.4–5.3)
PROT SERPL-MCNC: 8.2 G/DL (ref 6.8–8.8)
PSA SERPL-ACNC: 1.18 UG/L (ref 0–4)
RBC # BLD AUTO: 5.14 10E12/L (ref 4.4–5.9)
SODIUM SERPL-SCNC: 137 MMOL/L (ref 133–144)
TRIGL SERPL-MCNC: 545 MG/DL
WBC # BLD AUTO: 9 10E9/L (ref 4–11)

## 2021-03-15 PROCEDURE — 80053 COMPREHEN METABOLIC PANEL: CPT | Performed by: FAMILY MEDICINE

## 2021-03-15 PROCEDURE — 97110 THERAPEUTIC EXERCISES: CPT | Mod: GP | Performed by: PHYSICAL THERAPIST

## 2021-03-15 PROCEDURE — 99397 PER PM REEVAL EST PAT 65+ YR: CPT | Performed by: FAMILY MEDICINE

## 2021-03-15 PROCEDURE — 83721 ASSAY OF BLOOD LIPOPROTEIN: CPT | Performed by: FAMILY MEDICINE

## 2021-03-15 PROCEDURE — 85027 COMPLETE CBC AUTOMATED: CPT | Performed by: FAMILY MEDICINE

## 2021-03-15 PROCEDURE — 36415 COLL VENOUS BLD VENIPUNCTURE: CPT | Performed by: FAMILY MEDICINE

## 2021-03-15 PROCEDURE — 99213 OFFICE O/P EST LOW 20 MIN: CPT | Mod: 25 | Performed by: FAMILY MEDICINE

## 2021-03-15 PROCEDURE — 80061 LIPID PANEL: CPT | Performed by: FAMILY MEDICINE

## 2021-03-15 PROCEDURE — G0103 PSA SCREENING: HCPCS | Performed by: FAMILY MEDICINE

## 2021-03-15 RX ORDER — LISINOPRIL 20 MG/1
20 TABLET ORAL DAILY
Qty: 90 TABLET | Refills: 3 | Status: SHIPPED | OUTPATIENT
Start: 2021-03-15 | End: 2022-02-18

## 2021-03-15 RX ORDER — TADALAFIL 10 MG/1
10 TABLET ORAL DAILY PRN
Qty: 30 TABLET | Refills: 0 | Status: SHIPPED | OUTPATIENT
Start: 2021-03-15 | End: 2023-07-17

## 2021-03-15 ASSESSMENT — ENCOUNTER SYMPTOMS
HEMATURIA: 0
PARESTHESIAS: 0
NERVOUS/ANXIOUS: 1
DIZZINESS: 0
EYE PAIN: 0
ABDOMINAL PAIN: 0
CHILLS: 0
FREQUENCY: 0
HEMATOCHEZIA: 0
PALPITATIONS: 0
DYSURIA: 0
FEVER: 0
CONSTIPATION: 0
SORE THROAT: 0
SHORTNESS OF BREATH: 0
WEAKNESS: 0
HEARTBURN: 0
HEADACHES: 0
DIARRHEA: 0
COUGH: 0
MYALGIAS: 1
NAUSEA: 0
JOINT SWELLING: 0
ARTHRALGIAS: 1

## 2021-03-15 ASSESSMENT — ACTIVITIES OF DAILY LIVING (ADL): CURRENT_FUNCTION: NO ASSISTANCE NEEDED

## 2021-03-15 ASSESSMENT — MIFFLIN-ST. JEOR: SCORE: 1679.71

## 2021-03-15 NOTE — PROGRESS NOTES
"DISCHARGE REPORT    Progress reporting period is from 1/25/2021 to 3/15/2021.       SUBJECTIVE  \"My left shoulder is really sore\" and it hurts more than before over the course of the last 2 weeks. Now he has a hard time sleeping. Had an MRI and they want him to go to ortho. Right shoulder feels about the same as far as pain. He hasn't been doing the cross friction massage at home.     Initial Pain level: 0/10 at rest (6-7/10 L shoulder reaching behind to put on jacket, 3/10 R).   Changes in function:  Yes (See Goal flowsheet attached for changes in current functional level)  Adverse reaction to treatment or activity: treatment - pt was excessively loading some exercises in HEP which increased pain    OBJECTIVE  Objective: AROM R flex 160deg*, abd 160deg, ext/IR T12, L flex 155deg*, abd 155deg*, ext/IR L2     ASSESSMENT/PLAN  Updated problem list and treatment plan: Diagnosis 1:  B shoulder pain  Pain -  home program  Decreased ROM/flexibility - home program  Decreased joint mobility - home program  Decreased strength - home program  Impaired muscle performance - home program  Decreased function - home program  Impaired posture - home program  STG/LTGs have been met or progress has been made towards goals:  None  Assessment of Progress: The patient is no longer making progress in all 3 of the following areas: subjectively, objectively and functionally.  Self Management Plans:  Patient has been instructed in a home treatment program.  Patient  has been instructed in self management of symptoms.  I have re-evaluated this patient and find that the nature, scope, duration and intensity of the therapy is appropriate for the medical condition of the patient.  Krishna continues to require the following intervention to meet STG and LTG's:  PT intervention is no longer required to meet STG/LTG.    Recommendations:  This patient is ready to be discharged from therapy and continue their home treatment program.  This patient " would benefit from further evaluation.    Please refer to the daily flowsheet for treatment today, total treatment time and time spent performing 1:1 timed codes.

## 2021-03-15 NOTE — PROGRESS NOTES
SUBJECTIVE:   Krishna Dugan is a 65 year old male who presents for Preventive Visit.    Patient has been advised of split billing requirements and indicates understanding: Yes   Are you in the first 12 months of your Medicare coverage?  No    HPI  Do you feel safe in your environment? Yes    Have you ever done Advance Care Planning? (For example, a Health Directive, POLST, or a discussion with a medical provider or your loved ones about your wishes): No, advance care planning information given to patient to review.  Advanced care planning was discussed at today's visit.      Fall risk  Fallen 2 or more times in the past year?: (P) No  Any fall with injury in the past year?: (P) No    Cognitive Screening   1) Repeat 3 items (Leader, Season, Table)    2) Clock draw: NORMAL  3) 3 item recall: Recalls 2 objects   Results: NORMAL clock, 1-2 items recalled: COGNITIVE IMPAIRMENT LESS LIKELY    Mini-CogTM Copyright S Ervin. Licensed by the author for use in [facility]; reprinted with permission (saniya@[facility]). All rights reserved.      Do you have sleep apnea, excessive snoring or daytime drowsiness?: no    Reviewed and updated as needed this visit by clinical staff  Tobacco  Allergies  Meds  Problems  Med Hx  Surg Hx  Fam Hx  Soc Hx          Reviewed and updated as needed this visit by Provider    Meds  Problems            Social History     Tobacco Use     Smoking status: Former Smoker     Packs/day: 0.50     Years: 10.00     Pack years: 5.00     Types: Cigarettes     Smokeless tobacco: Never Used   Substance Use Topics     Alcohol use: No     Comment: very little         Alcohol Use 3/15/2021   Prescreen: >3 drinks/day or >7 drinks/week? No       -------------------------------------    Current providers sharing in care for this patient include:   Patient Care Team:  Huong Tomas MD as PCP - General (Family Medicine)  Huong Tomas MD as Assigned PCP  Kenneth Ward MD as Assigned  Neuroscience Provider    The following health maintenance items are reviewed in Epic and correct as of today:  Health Maintenance   Topic Date Due     ADVANCE CARE PLANNING  Never done     COLORECTAL CANCER SCREENING  Never done     ZOSTER IMMUNIZATION (1 of 2) Never done     Pneumococcal Vaccine: 65+ Years (2 of 2 - PPSV23) 12/12/2020     COVID-19 Vaccine (2 of 2 - Moderna series) 04/02/2021     MEDICARE ANNUAL WELLNESS VISIT  03/15/2022     FALL RISK ASSESSMENT  03/15/2022     LIPID  03/15/2026     DTAP/TDAP/TD IMMUNIZATION (3 - Td) 11/21/2028     HEPATITIS C SCREENING  Completed     PHQ-2  Completed     INFLUENZA VACCINE  Completed     AORTIC ANEURYSM SCREENING (SYSTEM ASSIGNED)  Completed     Pneumococcal Vaccine: Pediatrics (0 to 5 Years) and At-Risk Patients (6 to 64 Years)  Aged Out     IPV IMMUNIZATION  Aged Out     MENINGITIS IMMUNIZATION  Aged Out     HEPATITIS B IMMUNIZATION  Aged Out     Labs reviewed in EPIC  BP Readings from Last 3 Encounters:   03/15/21 132/84   02/21/20 135/70   02/04/20 128/78    Wt Readings from Last 3 Encounters:   03/15/21 92.5 kg (204 lb)   02/04/20 92.8 kg (204 lb 8 oz)   12/12/19 94.6 kg (208 lb 8 oz)                  Patient Active Problem List   Diagnosis     Corneal foreign body     Essential hypertension, benign     Hyperlipidemia     S/P colonoscopic polypectomy     Allergic rhinitis due to animal dander     Allergic rhinitis due to dust mite     Abnormal involuntary movements     Gastroesophageal reflux disease without esophagitis     Other testicular hypofunction     Vitamin D deficiency     Essential tremor     Chronic right-sided low back pain without sciatica     Sexual dysfunction     History reviewed. No pertinent surgical history.    Social History     Tobacco Use     Smoking status: Former Smoker     Packs/day: 0.50     Years: 10.00     Pack years: 5.00     Types: Cigarettes     Smokeless tobacco: Never Used   Substance Use Topics     Alcohol use: No     Comment:  "very little     History reviewed. No pertinent family history.      Current Outpatient Medications   Medication Sig Dispense Refill     aspirin 81 MG chewable tablet Take 81 mg by mouth       azelastine (ASTELIN) 0.1 % nasal spray Spray 1-2 sprays into both nostrils 2 times daily 1 Bottle 3     cetirizine (ZYRTEC) 10 MG tablet Take 10 mg by mouth daily       fenofibrate (TRIGLIDE/LOFIBRA) 160 MG tablet Take 1 tablet (160 mg) by mouth daily 90 tablet 3     fluticasone (FLONASE) 50 MCG/ACT spray Spray 2 sprays into both nostrils daily 1 Bottle 11     ibuprofen (ADVIL/MOTRIN) 800 MG tablet Take 1 tablet (800 mg) by mouth every 8 hours as needed for moderate pain 30 tablet 0     lisinopril (ZESTRIL) 20 MG tablet Take 1 tablet (20 mg) by mouth daily 90 tablet 3     loratadine (CLARITIN) 10 MG tablet Take 1 tablet (10 mg) by mouth daily 90 tablet 3     omeprazole (PRILOSEC) 20 MG CR capsule Take 20 mg by mouth       tadalafil (CIALIS) 10 MG tablet Take 1 tablet (10 mg) by mouth daily as needed (erectile dysfunction) 30 tablet 0     VITAMIN D, CHOLECALCIFEROL, PO Take 5,000 Units by mouth daily       Allergies   Allergen Reactions     Valdecoxib      Ears turn purple  Facial flushing  Bextra     Recent Labs   Lab Test 03/15/21  1226 12/13/19  0927   LDL Cannot estimate LDL when triglyceride exceeds 400 mg/dL  138* 164*   HDL 42 37*   TRIG 545* 285*   ALT 48 27   CR 0.93 1.12   GFRESTIMATED 84 68   GFRESTBLACK >90 79   POTASSIUM 4.7 4.3          Review of Systems  Constitutional, HEENT, cardiovascular, pulmonary, GI, , musculoskeletal, neuro, skin, endocrine and psych systems are negative, except as otherwise noted.    OBJECTIVE:   /84   Pulse 87   Temp 96.7  F (35.9  C) (Temporal)   Ht 1.727 m (5' 7.99\")   Wt 92.5 kg (204 lb)   SpO2 99%   BMI 31.03 kg/m   Estimated body mass index is 31.03 kg/m  as calculated from the following:    Height as of this encounter: 1.727 m (5' 7.99\").    Weight as of this " encounter: 92.5 kg (204 lb).  Physical Exam  GENERAL: healthy, alert and no distress  EYES: Eyes grossly normal to inspection, PERRL and conjunctivae and sclerae normal  HENT: ear canals and TM's normal, nose and mouth without ulcers or lesions  NECK: no adenopathy, no asymmetry, masses, or scars and thyroid normal to palpation  RESP: lungs clear to auscultation - no rales, rhonchi or wheezes  CV: regular rate and rhythm, normal S1 S2, no S3 or S4, no murmur, click or rub, no peripheral edema and peripheral pulses strong  ABDOMEN: soft, nontender, no hepatosplenomegaly, no masses and bowel sounds normal  MS: no gross musculoskeletal defects noted, no edema  SKIN: no suspicious lesions or rashes  NEURO: Normal strength and tone, mentation intact and speech normal  PSYCH: mentation appears normal, affect normal/bright    Diagnostic Test Results:  Labs reviewed in Epic    ASSESSMENT / PLAN:   1. Screen for colon cancer  - GASTROENTEROLOGY ADULT REF PROCEDURE ONLY; Future  - LDL cholesterol direct    2. Benign essential hypertension  Blood pressures are well controlled  Refill meds  - lisinopril (ZESTRIL) 20 MG tablet; Take 1 tablet (20 mg) by mouth daily  Dispense: 90 tablet; Refill: 3  - Lipid panel reflex to direct LDL Fasting  - Comprehensive metabolic panel (BMP + Alb, Alk Phos, ALT, AST, Total. Bili, TP)  - CBC with platelets    3. Screening for prostate cancer  - PSA, screen    4. Essential tremor  Monitor for now. Does not need meds    5. Sexual dysfunction  Trial cialis  - tadalafil (CIALIS) 10 MG tablet; Take 1 tablet (10 mg) by mouth daily as needed (erectile dysfunction)  Dispense: 30 tablet; Refill: 0    6. Hypertriglyceridemia  Intolerance to statin  Hypertriglyceridemia  Trail fenofibrate  Encouraged weight loss and low fat diet to improve this    Patient has been advised of split billing requirements and indicates understanding: Yes  COUNSELING:  Reviewed preventive health counseling, as reflected in  "patient instructions       Regular exercise       Healthy diet/nutrition    Estimated body mass index is 31.03 kg/m  as calculated from the following:    Height as of this encounter: 1.727 m (5' 7.99\").    Weight as of this encounter: 92.5 kg (204 lb).    Weight management plan: Discussed healthy diet and exercise guidelines    He reports that he has quit smoking. His smoking use included cigarettes. He has a 5.00 pack-year smoking history. He has never used smokeless tobacco.      Appropriate preventive services were discussed with this patient, including applicable screening as appropriate for cardiovascular disease, diabetes, osteopenia/osteoporosis, and glaucoma.  As appropriate for age/gender, discussed screening for colorectal cancer, prostate cancer, breast cancer, and cervical cancer. Checklist reviewing preventive services available has been given to the patient.    Reviewed patients plan of care and provided an AVS. The Basic Care Plan (routine screening as documented in Health Maintenance) for Krishna meets the Care Plan requirement. This Care Plan has been established and reviewed with the Patient.    Counseling Resources:  ATP IV Guidelines  Pooled Cohorts Equation Calculator  Breast Cancer Risk Calculator  Breast Cancer: Medication to Reduce Risk  FRAX Risk Assessment  ICSI Preventive Guidelines  Dietary Guidelines for Americans, 2010  [employer]'s MyPlate  ASA Prophylaxis  Lung CA Screening    Huong Tomas MD  [facility]    Identified Health Risks:  "

## 2021-03-16 RX ORDER — FENOFIBRATE 160 MG/1
160 TABLET ORAL DAILY
Qty: 90 TABLET | Refills: 3 | Status: SHIPPED | OUTPATIENT
Start: 2021-03-16 | End: 2022-12-02

## 2021-03-16 NOTE — ASSESSMENT & PLAN NOTE
Intolerance to statin  Hypertriglyceridemia  Trail fenofibrate  Encouraged weight loss and low fat diet to improve this

## 2021-03-17 DIAGNOSIS — I10 BENIGN ESSENTIAL HYPERTENSION: ICD-10-CM

## 2021-03-17 RX ORDER — LISINOPRIL 20 MG/1
TABLET ORAL
Qty: 90 TABLET | Refills: 3 | OUTPATIENT
Start: 2021-03-17

## 2021-03-17 NOTE — TELEPHONE ENCOUNTER
Prescription was sent 3/15/21 for #90 with 3 refills.  Pharmacy notified via E-Prescribe refusal.     Deirdre Nguyễn RN on 3/17/2021 at 4:55 PM

## 2021-03-19 ENCOUNTER — TELEPHONE (OUTPATIENT)
Dept: FAMILY MEDICINE | Facility: OTHER | Age: 66
End: 2021-03-19

## 2021-04-09 ENCOUNTER — TRANSFERRED RECORDS (OUTPATIENT)
Dept: HEALTH INFORMATION MANAGEMENT | Facility: CLINIC | Age: 66
End: 2021-04-09

## 2021-04-20 NOTE — PROGRESS NOTES
[address]  [facility] [address]  Phone: [phone]  Primary Provider: Huong Tomas        PREOPERATIVE EVALUATION:  Today's date: 4/23/2021    Krishna Dugan is a 65 year old male who presents for a preoperative evaluation.    Surgical Information:  Surgery/Procedure: L shoulder rotator cuff repair and R shoulder injection  Surgery Location: [facility]  Surgeon: Dr. Torres  Surgery Date: 5/3/21  Time of Surgery: TBD  Where patient plans to recover: At home with family  Fax number for surgical facility: [phone]    Type of Anesthesia Anticipated: General    Assessment & Plan     The proposed surgical procedure is considered INTERMEDIATE risk.    Problem List Items Addressed This Visit     Essential hypertension, benign    Relevant Orders    EKG 12-lead complete w/read - Clinics (Completed)    Basic metabolic panel  (Ca, Cl, CO2, Creat, Gluc, K, Na, BUN) (Completed)    Hyperlipidemia      Other Visit Diagnoses     Preop general physical exam    -  Primary    Relevant Orders    Hemoglobin (Completed)    EKG 12-lead complete w/read - Clinics (Completed)    Basic metabolic panel  (Ca, Cl, CO2, Creat, Gluc, K, Na, BUN) (Completed)               Risks and Recommendations:  The patient has the following additional risks and recommendations for perioperative complications:   - No identified additional risk factors other than previously addressed    Medication Instructions:   - aspirin: Discontinue aspirin 7-10 days prior to procedure to reduce bleeding risk. It should be resumed postoperatively.    - ACE/ARB: May be continued on the day of surgery.     RECOMMENDATION:  APPROVAL GIVEN to proceed with proposed procedure, without further diagnostic evaluation.        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 Here for pre-op  eval      Preop Questions 4/23/2021   1. Have you ever had a heart attack or stroke? No   2. Have you ever had surgery on your heart or blood vessels, such as a stent placement, a coronary artery bypass, or surgery on an artery in your head, neck, heart, or legs? No   3. Do you have chest pain with activity? No   4. Do you have a history of  heart failure? No   5. Do you currently have a cold, bronchitis or symptoms of other infection? No   6. Do you have a cough, shortness of breath, or wheezing? No   7. Do you or anyone in your family have previous history of blood clots? No   8. Do you or does anyone in your family have a serious bleeding problem such as prolonged bleeding following surgeries or cuts? No   9. Have you ever had problems with anemia or been told to take iron pills? No   10. Have you had any abnormal blood loss such as black, tarry or bloody stools? No   11. Have you ever had a blood transfusion? No   12. Are you willing to have a blood transfusion if it is medically needed before, during, or after your surgery? Yes   13. Have you or any of your relatives ever had problems with anesthesia? No   14. Do you have sleep apnea, excessive snoring or daytime drowsiness? No   15. Do you have any artifical heart valves or other implanted medical devices like a pacemaker, defibrillator, or continuous glucose monitor? No   16. Do you have artificial joints? No   17. Are you allergic to latex? No     Health Care Directive:  Patient does not have a Health Care Directive or Living Will: Discussed advance care planning with patient; information given to patient to review.    Preoperative Review of :   reviewed - no record of controlled substances prescribed.      Status of Chronic Conditions:  See problem list for active medical problems.  Problems all longstanding and stable, except as noted/documented.  See ROS for pertinent symptoms related to these conditions.    HYPERLIPIDEMIA - Patient has a long  history of significant Hyperlipidemia requiring medication for treatment with recent good control. Patient reports no problems or side effects with the medication.     HYPERTENSION - Patient has longstanding history of HTN , currently denies any symptoms referable to elevated blood pressure. Specifically denies chest pain, palpitations, dyspnea, orthopnea, PND or peripheral edema. Blood pressure readings have been in normal range. Current medication regimen is as listed below. Patient denies any side effects of medication.       Review of Systems  CONSTITUTIONAL: NEGATIVE for fever, chills, change in weight  INTEGUMENTARY/SKIN: NEGATIVE for worrisome rashes, moles or lesions  EYES: NEGATIVE for vision changes or irritation  ENT/MOUTH: NEGATIVE for ear, mouth and throat problems  RESP: NEGATIVE for significant cough or SOB  BREAST: NEGATIVE for masses, tenderness or discharge  CV: NEGATIVE for chest pain, palpitations or peripheral edema  GI: NEGATIVE for nausea, abdominal pain, heartburn, or change in bowel habits  : NEGATIVE for frequency, dysuria, or hematuria  MUSCULOSKELETAL: NEGATIVE for significant arthralgias or myalgia  NEURO: NEGATIVE for weakness, dizziness or paresthesias  ENDOCRINE: NEGATIVE for temperature intolerance, skin/hair changes  HEME: NEGATIVE for bleeding problems  PSYCHIATRIC: NEGATIVE for changes in mood or affect    Patient Active Problem List    Diagnosis Date Noted     Essential tremor 03/15/2021     Priority: Medium     Chronic right-sided low back pain without sciatica 09/02/2020     Priority: Medium     Sexual dysfunction 09/02/2020     Priority: Medium     Abnormal involuntary movements 03/15/2019     Priority: Medium     Other testicular hypofunction 03/15/2019     Priority: Medium     Vitamin D deficiency 03/15/2019     Priority: Medium     Allergic rhinitis due to animal dander 03/06/2018     Priority: Medium     Allergic rhinitis due to dust mite 03/06/2018     Priority: Medium      Essential hypertension, benign 02/05/2018     Priority: Medium     Hyperlipidemia 02/05/2018     Priority: Medium     Overview:   HYPERLIPIDEMIA NEC/NOS       S/P colonoscopic polypectomy 02/05/2018     Priority: Medium     Gastroesophageal reflux disease without esophagitis 12/09/2015     Priority: Medium     Corneal foreign body 02/06/2012     Priority: Medium      Past Medical History:   Diagnosis Date     Diagnostic skin and sensitization tests 9/08 skin tests pos. for cat/dog ONLY (all other environmental allergens NEGATIVE)     History reviewed. No pertinent surgical history.  Current Outpatient Medications   Medication Sig Dispense Refill     azelastine (ASTELIN) 0.1 % nasal spray Spray 1-2 sprays into both nostrils 2 times daily 1 Bottle 3     fenofibrate (TRIGLIDE/LOFIBRA) 160 MG tablet Take 1 tablet (160 mg) by mouth daily 90 tablet 3     fluticasone (FLONASE) 50 MCG/ACT spray Spray 2 sprays into both nostrils daily 1 Bottle 11     lisinopril (ZESTRIL) 20 MG tablet Take 1 tablet (20 mg) by mouth daily 90 tablet 3     loratadine (CLARITIN) 10 MG tablet Take 1 tablet (10 mg) by mouth daily 90 tablet 3     omeprazole (PRILOSEC) 20 MG CR capsule Take 20 mg by mouth       tadalafil (CIALIS) 10 MG tablet Take 1 tablet (10 mg) by mouth daily as needed (erectile dysfunction) 30 tablet 0     VITAMIN D, CHOLECALCIFEROL, PO Take 5,000 Units by mouth daily       aspirin 81 MG chewable tablet Take 81 mg by mouth         Allergies   Allergen Reactions     Valdecoxib      Ears turn purple  Facial flushing  Bextra        Social History     Tobacco Use     Smoking status: Former Smoker     Packs/day: 0.50     Years: 10.00     Pack years: 5.00     Types: Cigarettes     Smokeless tobacco: Never Used   Substance Use Topics     Alcohol use: No     Comment: very little     History reviewed. No pertinent family history.  History   Drug Use No         Objective     /64   Pulse 88   Temp 98.8  F (37.1  C) (Temporal)    Resp 14   Wt 92.5 kg (204 lb)   SpO2 98%   BMI 31.03 kg/m      Physical Exam    GENERAL APPEARANCE: healthy, alert and no distress     EYES: EOMI,  PERRL     HENT: ear canals and TM's normal and nose and mouth without ulcers or lesions     NECK: no adenopathy, no asymmetry, masses, or scars and thyroid normal to palpation     RESP: lungs clear to auscultation - no rales, rhonchi or wheezes     CV: regular rates and rhythm, normal S1 S2, no S3 or S4 and no murmur, click or rub     ABDOMEN:  soft, nontender, no HSM or masses and bowel sounds normal     MS: extremities normal- no gross deformities noted, no evidence of inflammation in joints, FROM in all extremities.     SKIN: no suspicious lesions or rashes     NEURO: Normal strength and tone, sensory exam grossly normal, mentation intact and speech normal     PSYCH: mentation appears normal. and affect normal/bright     LYMPHATICS: No cervical adenopathy    Recent Labs   Lab Test 03/15/21  1226 12/13/19  0927   HGB 14.5  --      --     137   POTASSIUM 4.7 4.3   CR 0.93 1.12        Diagnostics:  Labs pending at this time.  Results will be reviewed when available.   EKG: appears normal, NSR, normal axis, normal intervals, no acute ST/T changes c/w ischemia, no LVH by voltage criteria, unchanged from previous tracings    Revised Cardiac Risk Index (RCRI):  The patient has the following serious cardiovascular risks for perioperative complications:   - No serious cardiac risks = 0 points     RCRI Interpretation: 0 points: Class I (very low risk - 0.4% complication rate)           Signed Electronically by: Huong Tomas MD  Copy of this evaluation report is provided to requesting physician.

## 2021-04-20 NOTE — PATIENT INSTRUCTIONS

## 2021-04-23 ENCOUNTER — OFFICE VISIT (OUTPATIENT)
Dept: FAMILY MEDICINE | Facility: OTHER | Age: 66
End: 2021-04-23
Payer: OTHER MISCELLANEOUS

## 2021-04-23 VITALS
RESPIRATION RATE: 14 BRPM | HEART RATE: 88 BPM | WEIGHT: 204 LBS | OXYGEN SATURATION: 98 % | TEMPERATURE: 98.8 F | SYSTOLIC BLOOD PRESSURE: 118 MMHG | DIASTOLIC BLOOD PRESSURE: 64 MMHG | BODY MASS INDEX: 31.03 KG/M2

## 2021-04-23 DIAGNOSIS — E78.5 HYPERLIPIDEMIA, UNSPECIFIED HYPERLIPIDEMIA TYPE: ICD-10-CM

## 2021-04-23 DIAGNOSIS — Z01.818 PREOP GENERAL PHYSICAL EXAM: Primary | ICD-10-CM

## 2021-04-23 DIAGNOSIS — I10 ESSENTIAL HYPERTENSION, BENIGN: ICD-10-CM

## 2021-04-23 LAB
ANION GAP SERPL CALCULATED.3IONS-SCNC: 3 MMOL/L (ref 3–14)
BUN SERPL-MCNC: 15 MG/DL (ref 7–30)
CALCIUM SERPL-MCNC: 9.7 MG/DL (ref 8.5–10.1)
CHLORIDE SERPL-SCNC: 104 MMOL/L (ref 94–109)
CO2 SERPL-SCNC: 30 MMOL/L (ref 20–32)
CREAT SERPL-MCNC: 1.16 MG/DL (ref 0.66–1.25)
GFR SERPL CREATININE-BSD FRML MDRD: 65 ML/MIN/{1.73_M2}
GLUCOSE SERPL-MCNC: 102 MG/DL (ref 70–99)
HGB BLD-MCNC: 14.3 G/DL (ref 13.3–17.7)
POTASSIUM SERPL-SCNC: 4.6 MMOL/L (ref 3.4–5.3)
SODIUM SERPL-SCNC: 137 MMOL/L (ref 133–144)

## 2021-04-23 PROCEDURE — 93000 ELECTROCARDIOGRAM COMPLETE: CPT | Performed by: FAMILY MEDICINE

## 2021-04-23 PROCEDURE — 99214 OFFICE O/P EST MOD 30 MIN: CPT | Performed by: FAMILY MEDICINE

## 2021-04-23 PROCEDURE — 85018 HEMOGLOBIN: CPT | Performed by: FAMILY MEDICINE

## 2021-04-23 PROCEDURE — 80048 BASIC METABOLIC PNL TOTAL CA: CPT | Performed by: FAMILY MEDICINE

## 2021-04-23 PROCEDURE — 36415 COLL VENOUS BLD VENIPUNCTURE: CPT | Performed by: FAMILY MEDICINE

## 2021-04-23 NOTE — RESULT ENCOUNTER NOTE
Mr. Dugan    Your recent test results are attached. Stable kidney function. Marginally elevated blood glucose.     If you have any questions or concerns please contact me via My Chart or call the clinic at [phone]     Thank You  Huong Tomas MD.

## 2021-04-23 NOTE — RESULT ENCOUNTER NOTE
Mr. Dugan    Your recent test results are attached. Normal hemoglobin     If you have any questions or concerns please contact me via My Chart or call the clinic at [phone]     Thank You  Huong Tomas MD.

## 2021-06-01 ENCOUNTER — THERAPY VISIT (OUTPATIENT)
Dept: PHYSICAL THERAPY | Facility: CLINIC | Age: 66
End: 2021-06-01
Payer: OTHER MISCELLANEOUS

## 2021-06-01 DIAGNOSIS — Z98.890 S/P ROTATOR CUFF REPAIR: ICD-10-CM

## 2021-06-01 DIAGNOSIS — M25.512 ACUTE PAIN OF LEFT SHOULDER: ICD-10-CM

## 2021-06-01 PROCEDURE — 97161 PT EVAL LOW COMPLEX 20 MIN: CPT | Mod: GP | Performed by: PHYSICAL THERAPIST

## 2021-06-01 PROCEDURE — 97110 THERAPEUTIC EXERCISES: CPT | Mod: GP | Performed by: PHYSICAL THERAPIST

## 2021-06-01 NOTE — PROGRESS NOTES
Physical Therapy Initial Evaluation  Subjective:  The history is provided by the patient. No  was used.   Patient Health History  Krishna Dugan being seen for s/p L RCR.     Problem began: 1/8/2021.   Problem occurred: fell inside a machine at work   Pain is reported as 8/10 on pain scale.  General health as reported by patient is fair.  Pertinent medical history includes: fibromyalgia and high blood pressure.   Red flags:  None as reported by patient.  Medical allergies: none.   Surgeries include:  Orthopedic surgery.    Current medications:  Anti-inflammatory and high blood pressure medication.    Current occupation is .   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  Tried to rehab it but it got even worse.  Then had an MRI and it was determined there was a RCT.  DOS: 5/17/21.         Type of problem:  Left shoulder.    This is a new condition.  Condition occurred with:  A fall.  Where condition occurred: at work.    Pain is described as aching and is constant.  Pain radiates to:  Shoulder. Pain is worse during the night.  Since onset symptoms are unchanged.  Associated symptoms:  Loss of strength and loss of motion/stiffness. Exacerbated by: unable to use arm due to post op restrictions.  and relieved by ice and bracing/immobilizing.    Previous treatment includes surgery. Improved with treatment: TBD.  Restrictions due to condition include:  Working in an alternate job.  Barriers include:  None as reported by patient.                        Objective:  System                   Shoulder Evaluation:  ROM:  AROM:    Flexion:  Right:  155    Abduction:  Right:  155      External Rotation:  Right:  75                PROM:    Flexion:  Left:  47           Abduction:  Left:  65          External Rotation:  Left:  -4                        Strength:  : not tested.                                                               General     ROS    Assessment/Plan:    Patient is a 66 year old male with left shoulder complaints.    Patient has the following significant findings with corresponding treatment plan.                Diagnosis 1:  Shoulder pain s/p RCR  Pain -  manual therapy, self management, education and home program  Decreased ROM/flexibility - manual therapy, therapeutic exercise and home program  Decreased strength - therapeutic exercise, therapeutic activities and home program  Impaired muscle performance - neuro re-education and home program  Decreased function - therapeutic activities and home program    Therapy Evaluation Codes:   1) History comprised of:   Personal factors that impact the plan of care:      Time since onset of symptoms and Work status.    Comorbidity factors that impact the plan of care are:      None.     Medications impacting care: Pain.  2) Examination of Body Systems comprised of:   Body structures and functions that impact the plan of care:      Shoulder.   Activity limitations that impact the plan of care are:      Bathing, Dressing and Lifting.  3) Clinical presentation characteristics are:   Stable/Uncomplicated.  4) Decision-Making    Low complexity using standardized patient assessment instrument and/or measureable assessment of functional outcome.  Cumulative Therapy Evaluation is: Low complexity.    Previous and current functional limitations:  (See Goal Flow Sheet for this information)    Short term and Long term goals: (See Goal Flow Sheet for this information)     Communication ability:  Patient appears to be able to clearly communicate and understand verbal and written communication and follow directions correctly.  Treatment Explanation - The following has been discussed with the patient:   RX ordered/plan of  care  Anticipated outcomes  Possible risks and side effects  This patient would benefit from PT intervention to resume normal activities.   Rehab potential is good.    Frequency:  2 X week, once daily  Duration:  for 8 weeks tapering to 1 X a week over 8 weeks  Discharge Plan:  Achieve all LTG.  Independent in home treatment program.  Reach maximal therapeutic benefit.    Please refer to the daily flowsheet for treatment today, total treatment time and time spent performing 1:1 timed codes.

## 2021-06-08 ENCOUNTER — THERAPY VISIT (OUTPATIENT)
Dept: PHYSICAL THERAPY | Facility: CLINIC | Age: 66
End: 2021-06-08
Payer: OTHER MISCELLANEOUS

## 2021-06-08 DIAGNOSIS — M25.512 ACUTE PAIN OF LEFT SHOULDER: ICD-10-CM

## 2021-06-08 DIAGNOSIS — Z98.890 S/P ROTATOR CUFF REPAIR: ICD-10-CM

## 2021-06-08 PROCEDURE — 97110 THERAPEUTIC EXERCISES: CPT | Mod: GP | Performed by: PHYSICAL THERAPIST

## 2021-06-15 ENCOUNTER — THERAPY VISIT (OUTPATIENT)
Dept: PHYSICAL THERAPY | Facility: CLINIC | Age: 66
End: 2021-06-15
Payer: OTHER MISCELLANEOUS

## 2021-06-15 DIAGNOSIS — Z98.890 S/P ROTATOR CUFF REPAIR: ICD-10-CM

## 2021-06-15 DIAGNOSIS — M25.512 ACUTE PAIN OF LEFT SHOULDER: ICD-10-CM

## 2021-06-15 PROCEDURE — 97110 THERAPEUTIC EXERCISES: CPT | Mod: GP | Performed by: PHYSICAL THERAPIST

## 2021-06-15 NOTE — PROGRESS NOTES
SUBJECTIVE  Subjective changes as noted by pt:  Pt states he is a little sore from working part of the day.  Had his sling off for 2.5 hours.  States his surgeon gave him the OK to be out as much as he wanted     Current Pain level: 7/10   Changes in function:  None    Adverse reaction to treatment or activity:  None    OBJECTIVE  Objective: PROM L shoulder flex: 94, scaption: 83, ER: 8, IR: to belly     ASSESSMENT  Krishna continues to require intervention to meet STG and LTG's: PT  Patient is progressing as expected.  Response to therapy has shown an improvement in  ROM   Progress made towards STG/LTG?  None    PLAN  Continue current treatment plan until patient demonstrates readiness to progress to higher level exercises.  Gradually wean from sling rather than try to get rid of it all at once    PTA/ATC plan:  N/A    Please refer to the daily flowsheet for treatment today, total treatment time and time spent performing 1:1 timed codes.

## 2021-06-22 ENCOUNTER — THERAPY VISIT (OUTPATIENT)
Dept: PHYSICAL THERAPY | Facility: CLINIC | Age: 66
End: 2021-06-22
Payer: OTHER MISCELLANEOUS

## 2021-06-22 DIAGNOSIS — Z98.890 S/P ROTATOR CUFF REPAIR: ICD-10-CM

## 2021-06-22 DIAGNOSIS — M25.512 ACUTE PAIN OF LEFT SHOULDER: ICD-10-CM

## 2021-06-22 PROCEDURE — 97140 MANUAL THERAPY 1/> REGIONS: CPT | Mod: GP | Performed by: PHYSICAL THERAPIST

## 2021-06-22 PROCEDURE — 97110 THERAPEUTIC EXERCISES: CPT | Mod: GP | Performed by: PHYSICAL THERAPIST

## 2021-06-29 ENCOUNTER — THERAPY VISIT (OUTPATIENT)
Dept: PHYSICAL THERAPY | Facility: CLINIC | Age: 66
End: 2021-06-29
Payer: OTHER MISCELLANEOUS

## 2021-06-29 DIAGNOSIS — M25.512 ACUTE PAIN OF LEFT SHOULDER: ICD-10-CM

## 2021-06-29 DIAGNOSIS — Z98.890 S/P ROTATOR CUFF REPAIR: ICD-10-CM

## 2021-06-29 PROCEDURE — 97140 MANUAL THERAPY 1/> REGIONS: CPT | Mod: GP | Performed by: PHYSICAL THERAPIST

## 2021-06-29 PROCEDURE — 97110 THERAPEUTIC EXERCISES: CPT | Mod: GP | Performed by: PHYSICAL THERAPIST

## 2021-06-29 NOTE — PROGRESS NOTES
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  States he is being careful not to use the arm but sometimes it's hard   Current Pain level: 7/10   Changes in function:  Yes (See Goal flowsheet attached for changes in current functional level)     Adverse reaction to treatment or activity:  None    OBJECTIVE  Objective: PROM L shoulder flex: 118, ABD: 90, ER: 23, IR: 60     ASSESSMENT  Krishna continues to require intervention to meet STG and LTG's: PT  Patient is progressing as expected.  Response to therapy has shown an improvement in  pain level and ROM   Progress made towards STG/LTG?  Yes (See Goal flowsheet attached for updates on achievement of STG and LTG)    PLAN  Current treatment program is being advanced to more complex exercises.    PTA/ATC plan:  N/A    Please refer to the daily flowsheet for treatment today, total treatment time and time spent performing 1:1 timed codes.

## 2021-07-01 ENCOUNTER — NURSE TRIAGE (OUTPATIENT)
Dept: NURSING | Facility: CLINIC | Age: 66
End: 2021-07-01

## 2021-07-01 ENCOUNTER — THERAPY VISIT (OUTPATIENT)
Dept: PHYSICAL THERAPY | Facility: CLINIC | Age: 66
End: 2021-07-01
Payer: OTHER MISCELLANEOUS

## 2021-07-01 DIAGNOSIS — Z98.890 S/P ROTATOR CUFF REPAIR: ICD-10-CM

## 2021-07-01 DIAGNOSIS — M25.512 ACUTE PAIN OF LEFT SHOULDER: ICD-10-CM

## 2021-07-01 DIAGNOSIS — K44.9 HIATAL HERNIA: Primary | ICD-10-CM

## 2021-07-01 PROCEDURE — 97140 MANUAL THERAPY 1/> REGIONS: CPT | Mod: GP | Performed by: PHYSICAL THERAPIST

## 2021-07-01 PROCEDURE — 97110 THERAPEUTIC EXERCISES: CPT | Mod: GP | Performed by: PHYSICAL THERAPIST

## 2021-07-01 RX ORDER — OMEPRAZOLE 40 MG/1
40 CAPSULE, DELAYED RELEASE ORAL
Qty: 90 CAPSULE | Refills: 0 | Status: SHIPPED | OUTPATIENT
Start: 2021-07-01 | End: 2021-09-28

## 2021-07-01 NOTE — TELEPHONE ENCOUNTER
RN Triage:    Calling to request refill of omeprazole.  Is not on current med list so RN is unable to refill.    Krishna has been taking it for 8-9 years for longstanding hiatal hernia.  Previously prescribed by another provider.    Omeprazole 40 mg;   Si every day before dinner.    [facility] in [place].    Jen Sanderson RN 21 11:06 AM  [facility] Nurse Advisor

## 2021-07-06 ENCOUNTER — THERAPY VISIT (OUTPATIENT)
Dept: PHYSICAL THERAPY | Facility: CLINIC | Age: 66
End: 2021-07-06
Payer: OTHER MISCELLANEOUS

## 2021-07-06 DIAGNOSIS — M25.512 ACUTE PAIN OF LEFT SHOULDER: ICD-10-CM

## 2021-07-06 DIAGNOSIS — Z98.890 S/P ROTATOR CUFF REPAIR: ICD-10-CM

## 2021-07-06 PROCEDURE — 97140 MANUAL THERAPY 1/> REGIONS: CPT | Mod: GP | Performed by: PHYSICAL THERAPIST

## 2021-07-06 PROCEDURE — 97110 THERAPEUTIC EXERCISES: CPT | Mod: GP | Performed by: PHYSICAL THERAPIST

## 2021-07-08 ENCOUNTER — THERAPY VISIT (OUTPATIENT)
Dept: PHYSICAL THERAPY | Facility: CLINIC | Age: 66
End: 2021-07-08
Payer: OTHER MISCELLANEOUS

## 2021-07-08 DIAGNOSIS — Z98.890 S/P ROTATOR CUFF REPAIR: ICD-10-CM

## 2021-07-08 DIAGNOSIS — M25.512 ACUTE PAIN OF LEFT SHOULDER: ICD-10-CM

## 2021-07-08 PROCEDURE — 97140 MANUAL THERAPY 1/> REGIONS: CPT | Mod: GP | Performed by: PHYSICAL THERAPIST

## 2021-07-08 PROCEDURE — 97110 THERAPEUTIC EXERCISES: CPT | Mod: GP | Performed by: PHYSICAL THERAPIST

## 2021-07-13 ENCOUNTER — THERAPY VISIT (OUTPATIENT)
Dept: PHYSICAL THERAPY | Facility: CLINIC | Age: 66
End: 2021-07-13
Payer: OTHER MISCELLANEOUS

## 2021-07-13 DIAGNOSIS — M25.512 ACUTE PAIN OF LEFT SHOULDER: ICD-10-CM

## 2021-07-13 DIAGNOSIS — Z98.890 S/P ROTATOR CUFF REPAIR: ICD-10-CM

## 2021-07-13 PROCEDURE — 97110 THERAPEUTIC EXERCISES: CPT | Mod: GP | Performed by: PHYSICAL THERAPIST

## 2021-07-13 PROCEDURE — 97140 MANUAL THERAPY 1/> REGIONS: CPT | Mod: GP | Performed by: PHYSICAL THERAPIST

## 2021-07-15 ENCOUNTER — THERAPY VISIT (OUTPATIENT)
Dept: PHYSICAL THERAPY | Facility: CLINIC | Age: 66
End: 2021-07-15
Payer: OTHER MISCELLANEOUS

## 2021-07-15 DIAGNOSIS — M25.512 ACUTE PAIN OF LEFT SHOULDER: ICD-10-CM

## 2021-07-15 DIAGNOSIS — Z98.890 S/P ROTATOR CUFF REPAIR: ICD-10-CM

## 2021-07-15 PROCEDURE — 97110 THERAPEUTIC EXERCISES: CPT | Mod: GP | Performed by: PHYSICAL THERAPIST

## 2021-07-15 PROCEDURE — 97140 MANUAL THERAPY 1/> REGIONS: CPT | Mod: GP | Performed by: PHYSICAL THERAPIST

## 2021-07-19 ENCOUNTER — THERAPY VISIT (OUTPATIENT)
Dept: PHYSICAL THERAPY | Facility: CLINIC | Age: 66
End: 2021-07-19
Payer: OTHER MISCELLANEOUS

## 2021-07-19 DIAGNOSIS — M25.512 ACUTE PAIN OF LEFT SHOULDER: ICD-10-CM

## 2021-07-19 DIAGNOSIS — Z98.890 S/P ROTATOR CUFF REPAIR: ICD-10-CM

## 2021-07-19 PROCEDURE — 97140 MANUAL THERAPY 1/> REGIONS: CPT | Mod: GP | Performed by: PHYSICAL THERAPIST

## 2021-07-19 PROCEDURE — 97110 THERAPEUTIC EXERCISES: CPT | Mod: GP | Performed by: PHYSICAL THERAPIST

## 2021-07-19 NOTE — PROGRESS NOTES
Subjective:  HPI  Physical Exam                    Objective:  System    Physical Exam    General     ROS    Assessment/Plan:    PROGRESS  REPORT    Progress reporting period is from 6/01/21 to 7/19/21       SUBJECTIVE  Patient reports moderate improvement in his left shoulder since the start of therapy.  He is currently 9 weeks post-op and is able to more easily use his arm to perform ADLs.  Has been complaining of pain lately in anterior and superior aspect of shoulder that limits him from reaching across his body, behind his back, or lowering his arm on an extended elbow from shoulder position.  Left hand swelling seems to be improving, although he reports pain and cramping in his fingers at times.    Current pain level is 4/10  .     Initial Pain level: 8/10.   Changes in function:  Yes, as noted above  Adverse reaction to treatment or activity: None    OBJECTIVE    LEFT SHOULDER AROM PROM STRENGTH   Flex 108 125 3-/5*   Abd 80 110 3-/5   IR L1 50 4+/5*   ER 25 30 5/5     NOTE: capsular end feel with all PROM  Palpation: tenderness left ACJ, bicipital groove, and coracoid   Mobility: hypomobility with inferior and posterior glides    ASSESSMENT/PLAN  Updated problem list and treatment plan: Diagnosis 1:  Left Shoulder Pain S/P RCR   Pain -  self management, education and home program  Decreased ROM/flexibility - therapeutic exercise  Decreased joint mobility - manual therapy and therapeutic exercise  Decreased strength - therapeutic exercise  Decreased function - home program    STG/LTGs have been met or progress has been made towards goals:  Yes (See Goal flow sheet completed today.)  Assessment of Progress: patient's progress has been slower than expected.   Protocol progression to AROM limited by ongoing mobility restrictions with PROM    Self Management Plans:  Patient has been instructed in a home treatment program.  I have re-evaluated this patient and find that the nature, scope, duration and intensity of  the therapy is appropriate for the medical condition of the patient.  Krishna continues to require the following intervention to meet STG and LTG's:  PT    Recommendations:  This patient would benefit from continued therapy to further increase mobility and progress strength.     Frequency:  2 X week, once daily  Duration:  for 4 weeks, tapering to 1x/week for 4 weeks        Please refer to the daily flowsheet for treatment today, total treatment time and time spent performing 1:1 timed codes.

## 2021-08-03 ENCOUNTER — THERAPY VISIT (OUTPATIENT)
Dept: PHYSICAL THERAPY | Facility: CLINIC | Age: 66
End: 2021-08-03
Payer: OTHER MISCELLANEOUS

## 2021-08-03 ENCOUNTER — TELEPHONE (OUTPATIENT)
Dept: PHYSICAL THERAPY | Facility: CLINIC | Age: 66
End: 2021-08-03

## 2021-08-03 DIAGNOSIS — M25.512 ACUTE PAIN OF LEFT SHOULDER: ICD-10-CM

## 2021-08-03 DIAGNOSIS — Z98.890 S/P ROTATOR CUFF REPAIR: ICD-10-CM

## 2021-08-03 PROCEDURE — 97110 THERAPEUTIC EXERCISES: CPT | Mod: GP | Performed by: PHYSICAL THERAPIST

## 2021-08-03 PROCEDURE — 97140 MANUAL THERAPY 1/> REGIONS: CPT | Mod: GP | Performed by: PHYSICAL THERAPIST

## 2021-08-03 NOTE — TELEPHONE ENCOUNTER
Spoke with Michelle Camacho at [facility].  Rec'd add'l authorization for 12 more visits.

## 2021-08-05 ENCOUNTER — THERAPY VISIT (OUTPATIENT)
Dept: PHYSICAL THERAPY | Facility: CLINIC | Age: 66
End: 2021-08-05
Payer: OTHER MISCELLANEOUS

## 2021-08-05 DIAGNOSIS — Z98.890 S/P ROTATOR CUFF REPAIR: ICD-10-CM

## 2021-08-05 DIAGNOSIS — M25.512 ACUTE PAIN OF LEFT SHOULDER: ICD-10-CM

## 2021-08-05 PROCEDURE — 97110 THERAPEUTIC EXERCISES: CPT | Mod: GP | Performed by: PHYSICAL THERAPIST

## 2021-08-05 PROCEDURE — 97112 NEUROMUSCULAR REEDUCATION: CPT | Mod: GP | Performed by: PHYSICAL THERAPIST

## 2021-08-05 PROCEDURE — 97140 MANUAL THERAPY 1/> REGIONS: CPT | Mod: GP | Performed by: PHYSICAL THERAPIST

## 2021-08-10 ENCOUNTER — THERAPY VISIT (OUTPATIENT)
Dept: PHYSICAL THERAPY | Facility: CLINIC | Age: 66
End: 2021-08-10
Payer: OTHER MISCELLANEOUS

## 2021-08-10 DIAGNOSIS — Z98.890 S/P ROTATOR CUFF REPAIR: ICD-10-CM

## 2021-08-10 DIAGNOSIS — M25.512 ACUTE PAIN OF LEFT SHOULDER: ICD-10-CM

## 2021-08-10 PROCEDURE — 97110 THERAPEUTIC EXERCISES: CPT | Mod: GP | Performed by: PHYSICAL THERAPIST

## 2021-08-10 PROCEDURE — 97140 MANUAL THERAPY 1/> REGIONS: CPT | Mod: GP | Performed by: PHYSICAL THERAPIST

## 2021-08-17 ENCOUNTER — THERAPY VISIT (OUTPATIENT)
Dept: PHYSICAL THERAPY | Facility: CLINIC | Age: 66
End: 2021-08-17
Payer: OTHER MISCELLANEOUS

## 2021-08-17 DIAGNOSIS — M25.512 ACUTE PAIN OF LEFT SHOULDER: ICD-10-CM

## 2021-08-17 DIAGNOSIS — Z98.890 S/P ROTATOR CUFF REPAIR: ICD-10-CM

## 2021-08-17 PROCEDURE — 97112 NEUROMUSCULAR REEDUCATION: CPT | Mod: GP | Performed by: PHYSICAL THERAPIST

## 2021-08-17 PROCEDURE — 97110 THERAPEUTIC EXERCISES: CPT | Mod: GP | Performed by: PHYSICAL THERAPIST

## 2021-08-17 NOTE — PROGRESS NOTES
SUBJECTIVE  Subjective: Pt reports he put in his notice at work today and took a long-term option.  Shoulder is largely the same with gradual improvements in ROM and strength   Current Pain level: 2/10   Changes in function:  Yes (See Goal flowsheet attached for changes in current functional level)     Adverse reaction to treatment or activity:  None    OBJECTIVE  Objective: L shoulder AROM Flex: 125, ABD: 106, ER: 28, IR: L3     ASSESSMENT  Krishna continues to require intervention to meet STG and LTG's: PT  Patient is progressing as expected.  Response to therapy has shown an improvement in  pain level, ROM  and strength  Progress made towards STG/LTG?  Yes (See Goal flowsheet attached for updates on achievement of STG and LTG)    PLAN  Current treatment program is being advanced to more complex exercises.    PTA/ATC plan:  N/A    Please refer to the daily flowsheet for treatment today, total treatment time and time spent performing 1:1 timed codes.

## 2021-08-24 ENCOUNTER — THERAPY VISIT (OUTPATIENT)
Dept: PHYSICAL THERAPY | Facility: CLINIC | Age: 66
End: 2021-08-24
Payer: OTHER MISCELLANEOUS

## 2021-08-24 DIAGNOSIS — Z98.890 S/P ROTATOR CUFF REPAIR: ICD-10-CM

## 2021-08-24 DIAGNOSIS — M25.512 ACUTE PAIN OF LEFT SHOULDER: ICD-10-CM

## 2021-08-24 PROCEDURE — 97112 NEUROMUSCULAR REEDUCATION: CPT | Mod: GP | Performed by: PHYSICAL THERAPIST

## 2021-08-24 PROCEDURE — 97110 THERAPEUTIC EXERCISES: CPT | Mod: GP | Performed by: PHYSICAL THERAPIST

## 2021-08-24 NOTE — LETTER
[facility] [place]  [address]  [address]  [place] [address]  [phone]    2021    Re: Krishna Dugan   :   1955  MRN:  [id number]   REFERRING PHYSICIAN:   DANTE [facility] [place]    Date of Initial Evaluation:  2021  Visits:  Rxs Used: 16  Reason for Referral:     S/P rotator cuff repair  Acute pain of left shoulder    EVALUATION SUMMARY    PROGRESS  REPORT    Progress reporting period is from 21 to 21.       SUBJECTIVE  Subjective: Sometimes more concerned about how his hand feels than how the shoulder feels.  Pain isn't too bad anymore.  Stiffnes is lessening.  Feels weak as expected    Current Pain level: 2/10.     Initial Pain level: 8/10.   Changes in function:  Yes  Adverse reaction to treatment or activity: None    OBJECTIVE  Objective: Still lacking about 10% of end range PROM for Flex and scaption, much more limited for ER.  L shoulder AROM Flex: 133, ABD: 120, ER: 29, IR: L1.  Shoulder shrug with elevation around 110-120     ASSESSMENT/PLAN  Updated problem list and treatment plan: Diagnosis 1:  L RCR  Pain -  manual therapy, self management, education and home program  Decreased ROM/flexibility - manual therapy, therapeutic exercise and home program  Decreased strength - therapeutic exercise, therapeutic activities and home program  Impaired muscle performance - neuro re-education and home program  STG/LTGs have been met or progress has been made towards goals:  Yes   Assessment of Progress: The patient's condition is improving.  The patient's condition has potential to improve.  Self Management Plans:  Patient has been instructed in a home treatment program.  Krishna continues to require the following intervention to meet STG and LTG's:  PT        Re: Krishna Dugan   :   1955        Recommendations:  This patient would benefit from continued therapy.      Frequency:  1-2 X week, once daily  Duration:  for 6 weeks          Thank you for your referral.    INQUIRIES  Therapist: Madi Brunson PT   [place] [id number] [facility] [address]  [facility] [address]  Phone: [phone]  Fax: [phone]

## 2021-08-24 NOTE — PROGRESS NOTES
PROGRESS  REPORT    Progress reporting period is from 6/1/21 to 8/24/21.       SUBJECTIVE  Subjective: Sometimes more concerned about how his hand feels than how the shoulder feels.  Pain isn't too bad anymore.  Stiffnes is lessening.  Feels weak as expected    Current Pain level: 2/10.     Initial Pain level: 8/10.   Changes in function:  Yes (See Goal flowsheet attached for changes in current functional level)  Adverse reaction to treatment or activity: None    OBJECTIVE  Objective: Still lacking about 10% of end range PROM for Flex and scaption, much more limited for ER.  L shoulder AROM Flex: 133, ABD: 120, ER: 29, IR: L1.  Shoulder shrug with elevation around 110-120     ASSESSMENT/PLAN  Updated problem list and treatment plan: Diagnosis 1:  L RCR  Pain -  manual therapy, self management, education and home program  Decreased ROM/flexibility - manual therapy, therapeutic exercise and home program  Decreased strength - therapeutic exercise, therapeutic activities and home program  Impaired muscle performance - neuro re-education and home program  STG/LTGs have been met or progress has been made towards goals:  Yes (See Goal flow sheet completed today.)  Assessment of Progress: The patient's condition is improving.  The patient's condition has potential to improve.  Self Management Plans:  Patient has been instructed in a home treatment program.  Krishna continues to require the following intervention to meet STG and LTG's:  PT    Recommendations:  This patient would benefit from continued therapy.     Frequency:  1-2 X week, once daily  Duration:  for 6 weeks        Please refer to the daily flowsheet for treatment today, total treatment time and time spent performing 1:1 timed codes.

## 2021-08-26 ENCOUNTER — THERAPY VISIT (OUTPATIENT)
Dept: PHYSICAL THERAPY | Facility: CLINIC | Age: 66
End: 2021-08-26
Payer: OTHER MISCELLANEOUS

## 2021-08-26 DIAGNOSIS — Z98.890 S/P ROTATOR CUFF REPAIR: ICD-10-CM

## 2021-08-26 DIAGNOSIS — M25.512 ACUTE PAIN OF LEFT SHOULDER: ICD-10-CM

## 2021-08-26 PROCEDURE — 97110 THERAPEUTIC EXERCISES: CPT | Mod: GP | Performed by: PHYSICAL THERAPIST

## 2021-08-26 PROCEDURE — 97112 NEUROMUSCULAR REEDUCATION: CPT | Mod: GP | Performed by: PHYSICAL THERAPIST

## 2021-08-26 PROCEDURE — 97140 MANUAL THERAPY 1/> REGIONS: CPT | Mod: GP | Performed by: PHYSICAL THERAPIST

## 2021-09-02 ENCOUNTER — THERAPY VISIT (OUTPATIENT)
Dept: OCCUPATIONAL THERAPY | Facility: CLINIC | Age: 66
End: 2021-09-02
Payer: OTHER MISCELLANEOUS

## 2021-09-02 ENCOUNTER — THERAPY VISIT (OUTPATIENT)
Dept: PHYSICAL THERAPY | Facility: CLINIC | Age: 66
End: 2021-09-02
Payer: OTHER MISCELLANEOUS

## 2021-09-02 DIAGNOSIS — R29.898 LEFT HAND WEAKNESS: Primary | ICD-10-CM

## 2021-09-02 DIAGNOSIS — Z98.890 S/P ROTATOR CUFF REPAIR: ICD-10-CM

## 2021-09-02 DIAGNOSIS — M25.512 ACUTE PAIN OF LEFT SHOULDER: ICD-10-CM

## 2021-09-02 PROCEDURE — 97110 THERAPEUTIC EXERCISES: CPT | Mod: GO | Performed by: OCCUPATIONAL THERAPIST

## 2021-09-02 PROCEDURE — 97110 THERAPEUTIC EXERCISES: CPT | Mod: GP | Performed by: PHYSICAL THERAPIST

## 2021-09-02 PROCEDURE — 97112 NEUROMUSCULAR REEDUCATION: CPT | Mod: GP | Performed by: PHYSICAL THERAPIST

## 2021-09-02 PROCEDURE — 97140 MANUAL THERAPY 1/> REGIONS: CPT | Mod: GP | Performed by: PHYSICAL THERAPIST

## 2021-09-02 PROCEDURE — 97165 OT EVAL LOW COMPLEX 30 MIN: CPT | Mod: GO | Performed by: OCCUPATIONAL THERAPIST

## 2021-09-02 NOTE — PROGRESS NOTES
Hand Therapy Initial Evaluation    Current Date:  9/2/2021    Subjective:  Krishna Dugan is a 66 year old right hand dominant male.    Diagnosis:   Left hand pain  DOI: 1/8/2021  DOS:  5/17/2021  Procedure:  Left shoulder RCR  Therapy referral: 7/26/2021 for hand strengthening     Patient reports symptoms of pain, stiffness/loss of motion, weakness/loss of strength and edema of the left hand which occurred after shoulder injury and surgery. Since onset symptoms are gradually getting better.  Special tests:  none of hand.  Previous treatment: seeing PT for shoulder therapy.  General health as reported by patient is fair.  Pertinent medical history includes:Fibromyalgia, High Blood Pressure, Smoking  Medical allergies:see EMR.  Surgical history: orthopedic: shoulder.  Medication history: Anti-inflammatory, High Blood Pressure.    Occupational Profile Information:  Current occupation is    Currently recently retired   Job Tasks: Driving, Lifting, Carrying  Prior functional level:  independent-shared household chores  Barriers include:none  Mobility: No difficulty  Transportation: drives  Leisure activities/hobbies: fishing    Functional Outcome Measure:  Upper Extremity Functional Index  SCORE:   Column Totals: 54/80  (A lower score indicates greater disability.)    Objective:  Pain Level (Scale 0-10)   9/2/2021   At Rest 0   With Use 3     Pain Description  Date 9/2/2021   Location hand   Pain Quality weakness   Frequency intermittent     Pain is worst  daytime   Exacerbated by  gripping   Relieved by rest   Progression Gradually getting better     Edema  Mild dorsal hand and wrist. Pt reports more significant swelling for first several weeks after shoulder surgery that has now decreased.    Sensation  WNL throughout all nerve distributions; per patient report    ROM  Mild wrist and finger stiffness. No thenar or intrinsic atrophy noted on exam.    Strength   (Measured in pounds)  Pain Report: - none  +  mild    ++ moderate    +++ severe    9/2/2021 9/2/2021   Trials Right Left   1  2  3 101  98  99 66+  56+  71+   Average 99 64     Lat Pinch 9/2/2021 9/2/2021   Trials Right Left   1  2  3 21  19  19 18-  17-  18-   Average 20 18     3 Pt Pinch 9/2/2021 9/2/2021   Trials Right Left   1  2  3 19  18  17 14-  15-  14-   Average 18 14     Assessment:  Patient presents with symptoms consistent with diagnosis of left hand pain and weakness s/p shoulder injury, with surgical  Intervention for RCR of shoulder.     Patient's limitations or Problem List includes:  Pain, Decreased ROM/motion, Increased edema, Weakness, Decreased , Decreased pinch and Tightness in musculature of the left hand which interferes with the patient's ability to perform Self Care Tasks (dressing, bathing), Work Tasks, Sleep Patterns, Recreational Activities, Household Chores and Driving  as compared to previous level of function.    Rehab Potential:  Excellent - Return to full activity, no limitations    Patient will benefit from skilled Occupational Therapy to increase ROM, flexibility, overall strength,  strength and pinch strength and decrease pain and edema to return to previous activity level and resume normal daily tasks and to reach their rehab potential.    Barriers to Learning:  No barrier    Communication Issues:  Patient appears to be able to clearly communicate and understand verbal and written communication and follow directions correctly.    Chart Review: Chart Review and Simple history review with patient    Identified Performance Deficits: bathing/showering, dressing, hygiene and grooming, driving and community mobility, home establishment and management, sleep, work and leisure activities    Assessment of Occupational Performance:  5 or more Performance Deficits    Clinical Decision Making (Complexity): Low complexity    Treatment Explanation:  The following has been discussed with the patient:  RX ordered/plan of  care  Anticipated outcomes  Possible risks and side effects    Plan:  Frequency:  1 X week, once daily  Duration:  for 6 weeks  Treatment Plan:    Modalities:    Paraffin   Therapeutic Exercise:    AROM, PROM, Tendon Gliding, Isotonics, Isometrics and Stabilization  Neuromuscular re-ed:   Nerve Gliding  Manual Techniques:   Myofascial release  Self Care:    Self Care Tasks    Discharge Plan:  Achieve all LTG.  Independent in home treatment program.  Reach maximal therapeutic benefit.    Home Exercise Program:  Tendon gliding with 3 fists  PROM wrist and forearm extensors and flexor stretch   strengthening with stress ball  Wrist E/F isotonics    Next Visit:  See in 1-2 weeks  Assess response to HEP  Progress strengthening and flexibility as indicated

## 2021-09-02 NOTE — LETTER
MITA [facility] [place]  [address]  [place] [address]  [phone]    2021    Re: Krishna Dugan   :   1955  MRN:  [id number]   REFERRING PHYSICIAN:   Izabella FAN [facility] [place]    Date of Initial Evaluation:  2021  Visits:  Rxs Used: 1  Reason for Referral:  Left hand weakness    EVALUATION SUMMARY    Hand Therapy Initial Evaluation    Current Date:  2021    Subjective:  Krishna Dugan is a 66 year old right hand dominant male.  Diagnosis:   Left hand pain  DOI: 2021  DOS:  2021  Procedure:  Left shoulder RCR  Therapy referral: 2021 for hand strengthening     Patient reports symptoms of pain, stiffness/loss of motion, weakness/loss of strength and edema of the left hand which occurred after shoulder injury and surgery. Since onset symptoms are gradually getting better.  Special tests:  none of hand.  Previous treatment: seeing PT for shoulder therapy.  General health as reported by patient is fair.  Pertinent medical history includes:Fibromyalgia, High Blood Pressure, Smoking  Medical allergies:see EMR.  Surgical history: orthopedic: shoulder.  Medication history: Anti-inflammatory, High Blood Pressure.    Occupational Profile Information:  Current occupation is    Currently recently retired   Job Tasks: Driving, Lifting, Carrying  Prior functional level:  independent-shared household chores  Barriers include:none  Mobility: No difficulty  Transportation: drives  Leisure activities/hobbies: fishing    Functional Outcome Measure:  Upper Extremity Functional Index  SCORE:   Column Totals: 54/80  (A lower score indicates greater disability.)    Objective:  Pain Level (Scale 0-10)   2021   At Rest 0   With Use 3     Pain Description  Date 2021   Location hand   Pain Quality weakness   Frequency intermittent     Pain is worst  daytime   Exacerbated by  gripping    Relieved by rest   Progression Gradually getting better     Edema  Mild dorsal hand and wrist. Pt reports more significant swelling for first several weeks after shoulder surgery that has now decreased.    Sensation  WNL throughout all nerve distributions; per patient report    ROM  Mild wrist and finger stiffness. No thenar or intrinsic atrophy noted on exam.    Strength   (Measured in pounds)  Pain Report: - none  + mild    ++ moderate    +++ severe    9/2/2021 9/2/2021   Trials Right Left   1  2  3 101  98  99 66+  56+  71+   Average 99 64     Lat Pinch 9/2/2021 9/2/2021   Trials Right Left   1  2  3 21  19  19 18-  17-  18-   Average 20 18     3 Pt Pinch 9/2/2021 9/2/2021   Trials Right Left   1  2  3 19  18  17 14-  15-  14-   Average 18 14     Assessment:  Patient presents with symptoms consistent with diagnosis of left hand pain and weakness s/p shoulder injury, with surgical  Intervention for RCR of shoulder.     Patient's limitations or Problem List includes:  Pain, Decreased ROM/motion, Increased edema, Weakness, Decreased , Decreased pinch and Tightness in musculature of the left hand which interferes with the patient's ability to perform Self Care Tasks (dressing, bathing), Work Tasks, Sleep Patterns, Recreational Activities, Household Chores and Driving  as compared to previous level of function.    Rehab Potential:  Excellent - Return to full activity, no limitations    Patient will benefit from skilled Occupational Therapy to increase ROM, flexibility, overall strength,  strength and pinch strength and decrease pain and edema to return to previous activity level and resume normal daily tasks and to reach their rehab potential.    Barriers to Learning:  No barrier    Communication Issues:  Patient appears to be able to clearly communicate and understand verbal and written communication and follow directions correctly.    Chart Review: Chart Review and Simple history review with  patient    Identified Performance Deficits: bathing/showering, dressing, hygiene and grooming, driving and community mobility, home establishment and management, sleep, work and leisure activities    Assessment of Occupational Performance:  5 or more Performance Deficits    Clinical Decision Making (Complexity): Low complexity    Treatment Explanation:  The following has been discussed with the patient:  RX ordered/plan of care  Anticipated outcomes  Possible risks and side effects    Plan:  Frequency:  1 X week, once daily  Duration:  for 6 weeks  Treatment Plan:    Modalities:    Paraffin   Therapeutic Exercise:    AROM, PROM, Tendon Gliding, Isotonics, Isometrics and Stabilization  Neuromuscular re-ed:   Nerve Gliding  Manual Techniques:   Myofascial release  Self Care:    Self Care Tasks    Discharge Plan:  Achieve all LTG.  Independent in home treatment program.  Reach maximal therapeutic benefit.    Home Exercise Program:  Tendon gliding with 3 fists  PROM wrist and forearm extensors and flexor stretch   strengthening with stress ball  Wrist E/F isotonics    Next Visit:  See in 1-2 weeks  Assess response to HEP  Progress strengthening and flexibility as indicated     Thank you for your referral.    INQUIRIES  Therapist: Edwina FAN [facility] [place]  [address] [address]  [place] [address]  Phone: [phone]  Fax: [phone]

## 2021-09-09 ENCOUNTER — THERAPY VISIT (OUTPATIENT)
Dept: OCCUPATIONAL THERAPY | Facility: CLINIC | Age: 66
End: 2021-09-09
Payer: OTHER MISCELLANEOUS

## 2021-09-09 ENCOUNTER — THERAPY VISIT (OUTPATIENT)
Dept: PHYSICAL THERAPY | Facility: CLINIC | Age: 66
End: 2021-09-09
Payer: OTHER MISCELLANEOUS

## 2021-09-09 DIAGNOSIS — R29.898 LEFT HAND WEAKNESS: ICD-10-CM

## 2021-09-09 DIAGNOSIS — Z98.890 S/P ROTATOR CUFF REPAIR: ICD-10-CM

## 2021-09-09 DIAGNOSIS — M25.512 ACUTE PAIN OF LEFT SHOULDER: ICD-10-CM

## 2021-09-09 PROCEDURE — 97110 THERAPEUTIC EXERCISES: CPT | Mod: GP | Performed by: PHYSICAL THERAPIST

## 2021-09-09 PROCEDURE — 97112 NEUROMUSCULAR REEDUCATION: CPT | Mod: GP | Performed by: PHYSICAL THERAPIST

## 2021-09-09 PROCEDURE — 97140 MANUAL THERAPY 1/> REGIONS: CPT | Mod: GO | Performed by: OCCUPATIONAL THERAPIST

## 2021-09-09 PROCEDURE — 97110 THERAPEUTIC EXERCISES: CPT | Mod: GO | Performed by: OCCUPATIONAL THERAPIST

## 2021-09-09 PROCEDURE — 97140 MANUAL THERAPY 1/> REGIONS: CPT | Mod: GP | Performed by: PHYSICAL THERAPIST

## 2021-09-12 ENCOUNTER — HEALTH MAINTENANCE LETTER (OUTPATIENT)
Age: 66
End: 2021-09-12

## 2021-09-16 ENCOUNTER — THERAPY VISIT (OUTPATIENT)
Dept: PHYSICAL THERAPY | Facility: CLINIC | Age: 66
End: 2021-09-16
Payer: OTHER MISCELLANEOUS

## 2021-09-16 ENCOUNTER — THERAPY VISIT (OUTPATIENT)
Dept: OCCUPATIONAL THERAPY | Facility: CLINIC | Age: 66
End: 2021-09-16
Payer: OTHER MISCELLANEOUS

## 2021-09-16 DIAGNOSIS — Z98.890 S/P ROTATOR CUFF REPAIR: ICD-10-CM

## 2021-09-16 DIAGNOSIS — R29.898 LEFT HAND WEAKNESS: ICD-10-CM

## 2021-09-16 DIAGNOSIS — M25.512 ACUTE PAIN OF LEFT SHOULDER: ICD-10-CM

## 2021-09-16 PROCEDURE — 97110 THERAPEUTIC EXERCISES: CPT | Mod: GO | Performed by: OCCUPATIONAL THERAPIST

## 2021-09-16 PROCEDURE — 97112 NEUROMUSCULAR REEDUCATION: CPT | Mod: GP | Performed by: PHYSICAL THERAPIST

## 2021-09-16 PROCEDURE — 97110 THERAPEUTIC EXERCISES: CPT | Mod: GP | Performed by: PHYSICAL THERAPIST

## 2021-09-16 NOTE — PROGRESS NOTES
SOAP note objective information for 9/16/2021:    Diagnosis:   Left hand pain  DOI: 1/8/2021  DOS:  5/17/2021  Procedure:  Left shoulder RCR  Therapy referral: 7/26/2021 for hand strengthening     Strength   (Measured in pounds)  Pain Report: - none  + mild    ++ moderate    +++ severe    9/2/2021 9/2/2021 9/16/2021   Trials Right Left Left   1  2  3 101  98  99 66+  56+  71+ 73-  78-  80-   Average 99 64 77     Home Exercise Program:  Tendon gliding with 3 fists  PROM wrist and forearm extensors and flexor stretch   strengthening with stress ball  Pinch strengthening with foam wedge  Finger Abd/Add isometric strengthening with foam wedge and RB  Wrist E/F isotonics    Next Visit:  See in 1-2 weeks  Assess response to pinch and intrinsic strengthening   Progress Report and UEFI  Discharge to [facility] if continued progress    Please refer to the daily flowsheet for treatment today, total treatment time and time spent performing 1:1 timed codes.

## 2021-09-16 NOTE — PROGRESS NOTES
SUBJECTIVE  Subjective: Feels stiff when he gets up in the morning.  Found it really hard to keep elbow straight when working on standing AROM   Current Pain level: 2/10   Changes in function:  Yes (See Goal flowsheet attached for changes in current functional level)     Adverse reaction to treatment or activity:  None    OBJECTIVE  Objective: AROM L shoulder flex: 130, ABD: 118.  Cues to avoid shrug when lifting away from the body     ASSESSMENT  Krishna continues to require intervention to meet STG and LTG's: PT  Patient is progressing as expected.  Response to therapy has shown an improvement in  pain level, ROM  and strength  Progress made towards STG/LTG?  Yes (See Goal flowsheet attached for updates on achievement of STG and LTG)    PLAN  Current treatment program is being advanced to more complex exercises.    PTA/ATC plan:  N/A    Please refer to the daily flowsheet for treatment today, total treatment time and time spent performing 1:1 timed codes.

## 2021-09-23 ENCOUNTER — THERAPY VISIT (OUTPATIENT)
Dept: PHYSICAL THERAPY | Facility: CLINIC | Age: 66
End: 2021-09-23
Payer: OTHER MISCELLANEOUS

## 2021-09-23 ENCOUNTER — THERAPY VISIT (OUTPATIENT)
Dept: OCCUPATIONAL THERAPY | Facility: CLINIC | Age: 66
End: 2021-09-23
Payer: OTHER MISCELLANEOUS

## 2021-09-23 DIAGNOSIS — M25.512 ACUTE PAIN OF LEFT SHOULDER: ICD-10-CM

## 2021-09-23 DIAGNOSIS — Z98.890 S/P ROTATOR CUFF REPAIR: ICD-10-CM

## 2021-09-23 DIAGNOSIS — R29.898 LEFT HAND WEAKNESS: ICD-10-CM

## 2021-09-23 PROCEDURE — 97112 NEUROMUSCULAR REEDUCATION: CPT | Mod: GP | Performed by: PHYSICAL THERAPIST

## 2021-09-23 PROCEDURE — 97110 THERAPEUTIC EXERCISES: CPT | Mod: GP | Performed by: PHYSICAL THERAPIST

## 2021-09-23 PROCEDURE — 97110 THERAPEUTIC EXERCISES: CPT | Mod: GO | Performed by: OCCUPATIONAL THERAPIST

## 2021-09-23 PROCEDURE — 97535 SELF CARE MNGMENT TRAINING: CPT | Mod: GO | Performed by: OCCUPATIONAL THERAPIST

## 2021-09-23 PROCEDURE — 97140 MANUAL THERAPY 1/> REGIONS: CPT | Mod: GP | Performed by: PHYSICAL THERAPIST

## 2021-09-23 NOTE — LETTER
MITA [facility] [place]  [address]  [place] [address]  [phone]    2021    Re: Krishna Dugan   :   1955  MRN:  [id number]   REFERRING PHYSICIAN:   Izabella FAN [facility] [place]    Date of Initial Evaluation:  2021  Visits:  Rxs Used: 4  Reason for Referral:  Left hand weakness    EVALUATION SUMMARY    Hand Therapy Progress/Discharge Note    Current Date:  2021    Reporting period is 2021 to 2021    Diagnosis:   Left hand pain  DOI: 2021  DOS:  2021  Procedure:  Left shoulder RCR  Therapy referral: 2021 for hand strengthening       Subjective:   Subjective changes noted by patient:  It just bothers me in the mornings and is more swollen when I wake up.  Functional changes noted by patient:  Improvement in Household Chores  Patient has noted adverse reaction to:  None    Functional Outcome Measure:  Upper Extremity Functional Index  SCORE:   Column Totals: 64/80  (A lower score indicates greater disability.)    Objective:  Pain Level (Scale 0-10)   2021   At Rest 0    With Use 3 No pain     Pain Description  Date 2021   Location hand No pain complaints in hand   Pain Quality weakness weakness   Frequency intermittent   Pain resolved   Pain is worst  daytime NA   Exacerbated by  gripping NA   Relieved by rest NA   Progression Gradually getting better Improving      Edema  Mild dorsal hand and wrist, especially in the mornings. Pt reports more significant swelling for first several weeks after shoulder surgery that has now decreased.    Sensation  WNL throughout all nerve distributions; per patient report    ROM  Mild wrist and finger stiffness. No thenar or intrinsic atrophy noted on exam.    Strength   (Measured in pounds)  Pain Report: - none  + mild    ++ moderate    +++ severe    2021   Trials Right Left Left    1  2  3 101  98  99 66+  56+  71+ 97-  87-  89-   Average 99 64 91     Lat Pinch 9/2/2021 9/2/2021 9/23/2021   Trials Right Left Left   1  2  3 21  19  19 18-  17-  18- 21-  20-  20-   Average 20 18 20     3 Pt Pinch 9/2/2021 9/2/2021 9/23/2021   Trials Right Left Left   1  2  3 19  18  17 14-  15-  14- 16  16  16   Average 18 14 16   Please refer to the daily flowsheet for treatment provided today.     Assessment:  Response to therapy has been improvement to:  Strength:   and pinch  Pain:  intensity of pain is decreased    Overall Assessment:  Patient's symptoms are resolving and patient is independent in home exercise program  STG/LTG:  STGoals have been reviewed and progress or achievement has occurred;  see goal sheet for details and updates.  I have re-evaluated this patient and find that the nature, scope, duration and intensity of the therapy is appropriate for the medical condition of the patient.    Plan:  Frequency/Duration:  Discharge from Hand Therapy; continue home program.    Recommendations for Continued Therapy  Home Exercise Program:  Tendon gliding with 3 fists  PROM wrist and forearm extensors and flexor stretch   strengthening with stress ball  Pinch strengthening with foam wedge  Finger Abd/Add isometric strengthening with foam wedge and RB  Wrist E/F isotonics      Thank you for your referral.    INQUIRIES  Therapist: Edwina FAN [facility] [place]  [address] [address]  [place] [address]  Phone: [phone]  Fax: [phone]

## 2021-09-23 NOTE — PROGRESS NOTES
Hand Therapy Progress/Discharge Note    Current Date:  9/23/2021    Reporting period is 9/2/2021 to 9/23/2021    Diagnosis:   Left hand pain  DOI: 1/8/2021  DOS:  5/17/2021  Procedure:  Left shoulder RCR  Therapy referral: 7/26/2021 for hand strengthening       Subjective:   Subjective changes noted by patient:  It just bothers me in the mornings and is more swollen when I wake up.  Functional changes noted by patient:  Improvement in Household Chores  Patient has noted adverse reaction to:  None    Functional Outcome Measure:  Upper Extremity Functional Index  SCORE:   Column Totals: 64/80  (A lower score indicates greater disability.)    Objective:  Pain Level (Scale 0-10)   9/2/2021 9/23/2021   At Rest 0    With Use 3 No pain     Pain Description  Date 9/2/2021 9/23/2021   Location hand No pain complaints in hand   Pain Quality weakness weakness   Frequency intermittent   Pain resolved   Pain is worst  daytime NA   Exacerbated by  gripping NA   Relieved by rest NA   Progression Gradually getting better Improving      Edema  Mild dorsal hand and wrist, especially in the mornings. Pt reports more significant swelling for first several weeks after shoulder surgery that has now decreased.    Sensation  WNL throughout all nerve distributions; per patient report    ROM  Mild wrist and finger stiffness. No thenar or intrinsic atrophy noted on exam.    Strength   (Measured in pounds)  Pain Report: - none  + mild    ++ moderate    +++ severe    9/2/2021 9/2/2021 9/23/2021   Trials Right Left Left   1  2  3 101  98  99 66+  56+  71+ 97-  87-  89-   Average 99 64 91     Lat Pinch 9/2/2021 9/2/2021 9/23/2021   Trials Right Left Left   1  2  3 21  19  19 18-  17-  18- 21-  20-  20-   Average 20 18 20     3 Pt Pinch 9/2/2021 9/2/2021 9/23/2021   Trials Right Left Left   1  2  3 19  18  17 14-  15-  14- 16  16  16   Average 18 14 16     Please refer to the daily flowsheet for treatment provided today.      Assessment:  Response to therapy has been improvement to:  Strength:   and pinch  Pain:  intensity of pain is decreased    Overall Assessment:  Patient's symptoms are resolving and patient is independent in home exercise program  STG/LTG:  STGoals have been reviewed and progress or achievement has occurred;  see goal sheet for details and updates.  I have re-evaluated this patient and find that the nature, scope, duration and intensity of the therapy is appropriate for the medical condition of the patient.    Plan:  Frequency/Duration:  Discharge from Hand Therapy; continue home program.    Recommendations for Continued Therapy  Home Exercise Program:  Tendon gliding with 3 fists  PROM wrist and forearm extensors and flexor stretch   strengthening with stress ball  Pinch strengthening with foam wedge  Finger Abd/Add isometric strengthening with foam wedge and RB  Wrist E/F isotonics

## 2021-09-26 DIAGNOSIS — K44.9 HIATAL HERNIA: ICD-10-CM

## 2021-09-28 RX ORDER — OMEPRAZOLE 40 MG/1
CAPSULE, DELAYED RELEASE ORAL
Qty: 90 CAPSULE | Refills: 0 | Status: SHIPPED | OUTPATIENT
Start: 2021-09-28 | End: 2021-12-30

## 2021-09-28 NOTE — TELEPHONE ENCOUNTER
Pending Prescriptions:                       Disp   Refills    omeprazole (PRILOSEC) 40 MG DR capsule [Ph*90 cap*0        Sig: TAKE 1 CAPSULE(40 MG) BY MOUTH DAILY WITH DINNER        Routing refill request to provider for review/approval because:  Medication is reported/historical  GALDINO BrooksN, RN, PHN  [place]  September 28, 2021

## 2021-09-29 NOTE — TELEPHONE ENCOUNTER
Patient notified, will check with wife.  Has had a colonoscopy, doesn't remember when or where.    Patient will My Chart information.    Maritza Alvarado XRO/

## 2021-10-12 ENCOUNTER — THERAPY VISIT (OUTPATIENT)
Dept: PHYSICAL THERAPY | Facility: CLINIC | Age: 66
End: 2021-10-12
Payer: OTHER MISCELLANEOUS

## 2021-10-12 DIAGNOSIS — M25.512 ACUTE PAIN OF LEFT SHOULDER: ICD-10-CM

## 2021-10-12 DIAGNOSIS — Z98.890 S/P ROTATOR CUFF REPAIR: ICD-10-CM

## 2021-10-12 PROCEDURE — 97110 THERAPEUTIC EXERCISES: CPT | Mod: GP | Performed by: PHYSICAL THERAPIST

## 2021-10-12 PROCEDURE — 97140 MANUAL THERAPY 1/> REGIONS: CPT | Mod: GP | Performed by: PHYSICAL THERAPIST

## 2021-10-12 NOTE — PROGRESS NOTES
"Subjective:  HPI  Physical Exam                    Objective:  System    Physical Exam    General     ROS    Assessment/Plan:    PROGRESS  REPORT    Progress reporting period is from 9/16/21 to 1012/21.       SUBJECTIVE  Patient reports minimal change in his shoulder over the past 4-6 weeks.  Recently returned from a trip to [place] so has not done exercises for a while.  States his shoulder has \"not been good\" and strength is \"still not there yet.\"  Notes ongoing pain pain when getting dressed, showering, and lying on left side.  Tried putting his carry-on into the overhead compartment on the plane, but couldn't do it. Wife had to help him.        Current Pain level: 4/10.     Initial Pain level: 8/10.   Changes in function:  None  Adverse reaction to treatment or activity: None    OBJECTIVE    LEFT SHOULDER AROM PROM STRENGTH   Flexion 110 125 3/5   Abduction 90 115 3/5   ER 26 40 5/5   IR L4 55 5/5     NOTE: capsular end feel with all PROM  Mobility:  Hypomobility with glenohumeral A-P and inferior glides    ASSESSMENT/PLAN  Updated problem list and treatment plan: Diagnosis 1:  Left Shoulder Pain S/P RCR   Pain -  self management, education and home program  Decreased ROM/flexibility - therapeutic exercise  Decreased joint mobility - manual therapy and therapeutic exercise  Decreased strength - therapeutic exercise  Impaired muscle performance - neuro re-education  Decreased function - home program    STG/LTGs have been met or progress has been made towards goals:  no  Assessment of Progress: The patient's progress has slowed.  Self Management Plans:  Patient has been instructed in a home treatment program.  I have re-evaluated this patient and find that the nature, scope, duration and intensity of the therapy is appropriate for the medical condition of the patient.  Krishna continues to require the following intervention to meet STG and LTG's:  PT and MD    Recommendations:  Patient has completed 22 of 24 " approved WC visits.  Recovery of strength remains delayed due to ongoing mobility deficits and capsular restrictions.  Please advise re: future POC.     Please refer to the daily flowsheet for treatment today, total treatment time and time spent performing 1:1 timed codes.

## 2021-10-12 NOTE — LETTER
"[facility]ILIT[facility] [place]  [address]  [address]  [place] [address]  [phone]    2021    Re: Krishna Dugan   :   1955  MRN:  [id number]   REFERRING PHYSICIAN:   Izabella FAN [facility] [place]    Date of Initial Evaluation:  2021  Visits:  Rxs Used: 22  Reason for Referral:     S/P rotator cuff repair  Acute pain of left shoulder    EVALUATION SUMMARY    Subjective:  HPI  Physical Exam                    Objective:  System    Physical Exam    General     ROS    Assessment/Plan:    PROGRESS  REPORT    Progress reporting period is from 21 to .       SUBJECTIVE  Patient reports minimal change in his shoulder over the past 4-6 weeks.  Recently returned from a trip to [place] so has not done exercises for a while.  States his shoulder has \"not been good\" and strength is \"still not there yet.\"  Notes ongoing pain pain when getting dressed, showering, and lying on left side.  Tried putting his carry-on into the overhead compartment on the plane, but couldn't do it. Wife had to help him.        Current Pain level: 4/10.     Initial Pain level: 8/10.   Changes in function:  None  Adverse reaction to treatment or activity: None      Re: Krishna Dugan   :   1955    OBJECTIVE    LEFT SHOULDER AROM PROM STRENGTH   Flexion 110 125 3/5   Abduction 90 115 3/5   ER 26 40 5/5   IR L4 55 5/5     NOTE: capsular end feel with all PROM  Mobility:  Hypomobility with glenohumeral A-P and inferior glides    ASSESSMENT/PLAN  Updated problem list and treatment plan: Diagnosis 1:  Left Shoulder Pain S/P RCR   Pain -  self management, education and home program  Decreased ROM/flexibility - therapeutic exercise  Decreased joint mobility - manual therapy and therapeutic exercise  Decreased strength - therapeutic exercise  Impaired muscle performance - neuro re-education  Decreased function - home " program    STG/LTGs have been met or progress has been made towards goals:  no  Assessment of Progress: The patient's progress has slowed.  Self Management Plans:  Patient has been instructed in a home treatment program.  I have re-evaluated this patient and find that the nature, scope, duration and intensity of the therapy is appropriate for the medical condition of the patient.  Krishna continues to require the following intervention to meet STG and LTG's:  PT and MD    Recommendations:  Patient has completed 22 of 24 approved  visits.  Recovery of strength remains delayed due to ongoing mobility deficits and capsular restrictions.  Please advise re: future POC.     Thank you for your referral.    INQUIRIES  Therapist: Gloria Khan PT  [address] [address]  Phone: [phone]  Fax: [phone]

## 2021-10-19 ENCOUNTER — THERAPY VISIT (OUTPATIENT)
Dept: PHYSICAL THERAPY | Facility: CLINIC | Age: 66
End: 2021-10-19
Payer: OTHER MISCELLANEOUS

## 2021-10-19 DIAGNOSIS — M25.512 ACUTE PAIN OF LEFT SHOULDER: ICD-10-CM

## 2021-10-19 DIAGNOSIS — Z98.890 S/P ROTATOR CUFF REPAIR: ICD-10-CM

## 2021-10-19 PROCEDURE — 97140 MANUAL THERAPY 1/> REGIONS: CPT | Mod: GP | Performed by: PHYSICAL THERAPIST

## 2021-10-19 PROCEDURE — 97110 THERAPEUTIC EXERCISES: CPT | Mod: GP | Performed by: PHYSICAL THERAPIST

## 2021-11-18 ENCOUNTER — THERAPY VISIT (OUTPATIENT)
Dept: PHYSICAL THERAPY | Facility: CLINIC | Age: 66
End: 2021-11-18
Payer: OTHER MISCELLANEOUS

## 2021-11-18 DIAGNOSIS — Z98.890 S/P ROTATOR CUFF REPAIR: ICD-10-CM

## 2021-11-18 DIAGNOSIS — M25.512 ACUTE PAIN OF LEFT SHOULDER: ICD-10-CM

## 2021-11-18 PROCEDURE — 97112 NEUROMUSCULAR REEDUCATION: CPT | Mod: GP | Performed by: PHYSICAL THERAPIST

## 2021-11-18 PROCEDURE — 97110 THERAPEUTIC EXERCISES: CPT | Mod: GP | Performed by: PHYSICAL THERAPIST

## 2021-11-18 PROCEDURE — 97140 MANUAL THERAPY 1/> REGIONS: CPT | Mod: GP | Performed by: PHYSICAL THERAPIST

## 2021-11-18 NOTE — LETTER
"[facility] RE[facility]ABILITATION [facility] [place]  [address]  [address]  [place] [address]  [phone]    2021    Re: Krishan Dugan   :   1955  MRN:  [id number]   REFERRING PHYSICIAN:   Izabella FAN [facility] SERVICES [place]    Date of Initial Evaluation:  2021  Visits:  Rxs Used: 24  Reason for Referral:     S/P rotator cuff repair  Acute pain of left shoulder    EVALUATION SUMMARY    Subjective:  HPI  Physical Exam                    Objective:  System    Physical Exam    General     ROS    Assessment/Plan:    PROGRESS REPORT    Progress reporting period is from 10/12/21 to 21.       SUBJECTIVE  Patient returns to PT following a 4 week hiatus.  States he was recently ill for a couple weeks so was not able to do his home exercises and stretching.  Since resuming exercises, left shoulder has been pretty irritated and sore, but he thinks his mobility has gotten \"a little better.\"  Functionally, he continues to have  difficulty putting on a jacket or a shirt, lying comfortably on left side for any length of time, reaching overhead, or lifting above shoulder level.    Current pain level is 6/10  .     Previous pain level: 8/10.   Changes in function:  Yes (See Goal flowsheet attached for changes in current functional level)  Adverse reaction to treatment or activity: as noted above.    Re: Krishna Dugan   :   1955    OBJECTIVE    LEFT SHOULDER AROM PROM STRENGTH   Flexion 115 138 4/5   Abduction 108 120 4/5   ER 47 50 5/5   IR T12 65 5/5       ASSESSMENT/PLAN  Updated problem list and treatment plan: Diagnosis 1:  Left Shoulder Pain S/P RCR   Pain -  self management, education and home program  Decreased ROM/flexibility - therapeutic exercise  Decreased joint mobility - manual therapy and therapeutic exercise  Decreased strength - therapeutic exercise  Impaired muscle performance - neuro re-education  Decreased function - " home program    STG/LTGs have been met or progress has been made towards goals:  Yes (See Goal flow sheet completed today.)  Assessment of Progress: The patient's condition is improving, although progress has been slower than expected due to capsular restrictions.  Self Management Plans:  Patient has been instructed in a home exercise program.  I have re-evaluated this patient and find that the nature, scope, duration and intensity of the therapy is appropriate for the medical condition of the patient.  Krishna continues to require the following intervention to meet STG and LTG's:  HEP    Recommendations:  Patient has been advised to continue independently with home exercises at this time until next MD follow-up.  I am wondering if patient may be a candidate for a CSI to help decrease pain so he can more effectively work on recovery of motion and strength.  Please advise.    Thank you for your referral.    INQUIRIES  Therapist: Gloria Khan PT   [facility] SERVICES [place]  [id number] [place] [address]  Phone: [phone]  Fax: [phone]

## 2021-11-18 NOTE — PROGRESS NOTES
"Subjective:  HPI  Physical Exam                    Objective:  System    Physical Exam    General     ROS    Assessment/Plan:    PROGRESS REPORT    Progress reporting period is from 10/12/21 to 11/18/21.       SUBJECTIVE  Patient returns to PT following a 4 week hiatus.  States he was recently ill for a couple weeks so was not able to do his home exercises and stretching.  Since resuming exercises, left shoulder has been pretty irritated and sore, but he thinks his mobility has gotten \"a little better.\"  Functionally, he continues to have  difficulty putting on a jacket or a shirt, lying comfortably on left side for any length of time, reaching overhead, or lifting above shoulder level.    Current pain level is 6/10  .     Previous pain level: 8/10.   Changes in function:  Yes (See Goal flowsheet attached for changes in current functional level)  Adverse reaction to treatment or activity: as noted above.    OBJECTIVE    LEFT SHOULDER AROM PROM STRENGTH   Flexion 115 138 4/5   Abduction 108 120 4/5   ER 47 50 5/5   IR T12 65 5/5       ASSESSMENT/PLAN  Updated problem list and treatment plan: Diagnosis 1:  Left Shoulder Pain S/P RCR   Pain -  self management, education and home program  Decreased ROM/flexibility - therapeutic exercise  Decreased joint mobility - manual therapy and therapeutic exercise  Decreased strength - therapeutic exercise  Impaired muscle performance - neuro re-education  Decreased function - home program    STG/LTGs have been met or progress has been made towards goals:  Yes (See Goal flow sheet completed today.)  Assessment of Progress: The patient's condition is improving, although progress has been slower than expected due to capsular restrictions.  Self Management Plans:  Patient has been instructed in a home exercise program.  I have re-evaluated this patient and find that the nature, scope, duration and intensity of the therapy is appropriate for the medical condition of the " patient.  Krishna continues to require the following intervention to meet STG and LTG's:  HEP    Recommendations:  Patient has been advised to continue independently with home exercises at this time until next MD follow-up.  I am wondering if patient may be a candidate for a CSI to help decrease pain so he can more effectively work on recovery of motion and strength.  Please advise.    Please refer to the daily flowsheet for treatment today, total treatment time and time spent performing 1:1 timed codes.

## 2021-12-23 ENCOUNTER — THERAPY VISIT (OUTPATIENT)
Dept: PHYSICAL THERAPY | Facility: CLINIC | Age: 66
End: 2021-12-23
Payer: OTHER MISCELLANEOUS

## 2021-12-23 DIAGNOSIS — M25.512 ACUTE PAIN OF LEFT SHOULDER: ICD-10-CM

## 2021-12-23 DIAGNOSIS — Z98.890 S/P ROTATOR CUFF REPAIR: ICD-10-CM

## 2021-12-23 PROCEDURE — 97140 MANUAL THERAPY 1/> REGIONS: CPT | Mod: GP | Performed by: PHYSICAL THERAPIST

## 2021-12-23 PROCEDURE — 97110 THERAPEUTIC EXERCISES: CPT | Mod: GP | Performed by: PHYSICAL THERAPIST

## 2021-12-23 NOTE — LETTER
"[facility] REHABILITATION SERVICES [place]  [address]  [address]  [place] [address]  [phone]    2021    Re: Krishna Dugan   :   1955  MRN:  [id number]   REFERRING PHYSICIAN:   Izabella FAN [facility] SERVICES [place]    Date of Initial Evaluation:  2021  Visits:  Rxs Used: 25  Reason for Referral:     S/P rotator cuff repair  Acute pain of left shoulder    EVALUATION SUMMARY    Subjective:  HPI  Physical Exam                  Objective:  System    Physical Exam    General     ROS    Assessment/Plan:    PROGRESS  REPORT    Progress reporting period is from 21 to 21.       SUBJECTIVE  Patient returns to PT with add orders for 12 more visits.  Since last clinic visit, he states he cut back on his exercises some because he was \"tired of being sore\" all the time. Notices ongoing deficits with any reaching or lifting above shoulder level.     Current pain level is 3/10  .     Initial Pain level: 8/10.   Changes in function:  None  Adverse reaction to treatment or activity: None            Re: Krishna Dugan   :   1955    OBJECTIVE      LEFT SHOULDER AROM PROM STRENGTH   Flexion 118 150 4+/5   Abduction 118 135 4/5   ER 45 60 5/5   IR T12 65 5/5     Mobility: hypomobility GH joint with A/P and inferior glides    ASSESSMENT/PLAN  Updated problem list and treatment plan: Diagnosis 1:  Left Shoulder Pain S/P RCR   Pain -  self management, education and home program  Decreased ROM/flexibility - manual therapy and therapeutic exercise  Decreased joint mobility - manual therapy and therapeutic exercise  Decreased strength - therapeutic exercise  Impaired muscle performance - neuro re-education  Decreased function - home program    STG/LTGs have been met or progress has been made towards goals:  None  Assessment of Progress: The patient's condition is improving.  Self Management Plans:  Patient has been instructed in a " home treatment program.  I have re-evaluated this patient and find that the nature, scope, duration and intensity of the therapy is appropriate for the medical condition of the patient.  Krishna continues to require the following intervention to meet STG and LTG's:  PT    Recommendations:  This patient would benefit from continued therapy to further mobilize shoulder and increase strength for overhead activity.     Frequency:  1 X week, once daily  Duration:  for 12 weeks    Thank you for your referral.    INQUIRIES  Therapist: Gloria Khan PT  [address] [address]  Phone: [phone]  Fax: [phone]

## 2021-12-23 NOTE — PROGRESS NOTES
"Subjective:  HPI  Physical Exam                    Objective:  System    Physical Exam    General     ROS    Assessment/Plan:    PROGRESS  REPORT    Progress reporting period is from 11/18/21 to 12/23/21.       SUBJECTIVE  Patient returns to PT with add orders for 12 more visits.  Since last clinic visit, he states he cut back on his exercises some because he was \"tired of being sore\" all the time. Notices ongoing deficits with any reaching or lifting above shoulder level.     Current pain level is 3/10  .     Initial Pain level: 8/10.   Changes in function:  None  Adverse reaction to treatment or activity: None    OBJECTIVE    LEFT SHOULDER AROM PROM STRENGTH   Flexion 118 150 4+/5   Abduction 118 135 4/5   ER 45 60 5/5   IR T12 65 5/5     Mobility: hypomobility GH joint with A/P and inferior glides    ASSESSMENT/PLAN  Updated problem list and treatment plan: Diagnosis 1:  Left Shoulder Pain S/P RCR   Pain -  self management, education and home program  Decreased ROM/flexibility - manual therapy and therapeutic exercise  Decreased joint mobility - manual therapy and therapeutic exercise  Decreased strength - therapeutic exercise  Impaired muscle performance - neuro re-education  Decreased function - home program    STG/LTGs have been met or progress has been made towards goals:  None  Assessment of Progress: The patient's condition is improving.  Self Management Plans:  Patient has been instructed in a home treatment program.  I have re-evaluated this patient and find that the nature, scope, duration and intensity of the therapy is appropriate for the medical condition of the patient.  Krishna continues to require the following intervention to meet STG and LTG's:  PT    Recommendations:  This patient would benefit from continued therapy to further mobilize shoulder and increase strength for overhead activity.     Frequency:  1 X week, once daily  Duration:  for 12 weeks        Please refer to the daily flowsheet " for treatment today, total treatment time and time spent performing 1:1 timed codes.

## 2022-01-06 ENCOUNTER — THERAPY VISIT (OUTPATIENT)
Dept: PHYSICAL THERAPY | Facility: CLINIC | Age: 67
End: 2022-01-06
Payer: OTHER MISCELLANEOUS

## 2022-01-06 DIAGNOSIS — Z98.890 S/P ROTATOR CUFF REPAIR: ICD-10-CM

## 2022-01-06 DIAGNOSIS — M25.512 ACUTE PAIN OF LEFT SHOULDER: ICD-10-CM

## 2022-01-06 PROCEDURE — 97112 NEUROMUSCULAR REEDUCATION: CPT | Mod: GP

## 2022-01-06 PROCEDURE — 97110 THERAPEUTIC EXERCISES: CPT | Mod: GP

## 2022-01-06 PROCEDURE — 97140 MANUAL THERAPY 1/> REGIONS: CPT | Mod: GP

## 2022-01-13 ENCOUNTER — THERAPY VISIT (OUTPATIENT)
Dept: PHYSICAL THERAPY | Facility: CLINIC | Age: 67
End: 2022-01-13
Payer: OTHER MISCELLANEOUS

## 2022-01-13 DIAGNOSIS — Z98.890 S/P ROTATOR CUFF REPAIR: ICD-10-CM

## 2022-01-13 DIAGNOSIS — M25.512 ACUTE PAIN OF LEFT SHOULDER: Primary | ICD-10-CM

## 2022-01-13 PROCEDURE — 97112 NEUROMUSCULAR REEDUCATION: CPT | Mod: GP | Performed by: PHYSICAL THERAPIST

## 2022-01-13 PROCEDURE — 97140 MANUAL THERAPY 1/> REGIONS: CPT | Mod: GP | Performed by: PHYSICAL THERAPIST

## 2022-01-13 PROCEDURE — 97110 THERAPEUTIC EXERCISES: CPT | Mod: GP | Performed by: PHYSICAL THERAPIST

## 2022-01-13 NOTE — PROGRESS NOTES
Subjective:  HPI  Physical Exam                    Objective:  System    Physical Exam    General     ROS    Assessment/Plan:    PROGRESS  REPORT    Progress reporting period is from 11/18/21 to 1/13/22.       SUBJECTIVE  Patient reports minimal change in his shoulder over the past couple months.  Frustrated with delayed recovery.  Admits he has only been doing stretching exercises once a day versus 3x/day as instructed.  Complains of ongoing limitations with overhead reaching/lifting.         Current pain level is 5/10  .     Previous pain level was  6/10   Changes in function:  None  Adverse reaction to treatment or activity: None    OBJECTIVE    L SHOULDER AROM PROM STRENGTH   Flex 125 153 4+/5   Abduction 125 140 4+/5   IR/Ext T12 58 5/5   ER 45 62 5/5     Mobility: ongoing hypomobility exists with A/P and inferior glides  Scapular Stability: improving; dynamic dyskinesis less pronounced  ASSESSMENT/PLAN  Updated problem list and treatment plan: Diagnosis 1:  Left Shoulder Pain S/P RCR   Pain -  self management, education and home program  Decreased ROM/flexibility - manual therapy and therapeutic exercise  Decreased joint mobility - manual therapy and therapeutic exercise  Decreased strength - therapeutic exercise  Impaired muscle performance - neuro re-education  Decreased function - home program    STG/LTGs have been met or progress has been made towards goals:  None  Assessment of Progress: patient is progressing slower than expected  Self Management Plans:  Patient has been instructed in a home treatment program.  I have re-evaluated this patient and find that the nature, scope, duration and intensity of the therapy is appropriate for the medical condition of the patient.  Krishna continues to require the following intervention to meet STG and LTG's:  PT,MD    Recommendations:  Would like you to re-assess and advise.  ROM measurements above, though slightly improved since November, reflect minimal change.        Please refer to the daily flowsheet for treatment today, total treatment time and time spent performing 1:1 timed codes.

## 2022-01-13 NOTE — LETTER
[facility] REHABILITATION SERVICES [place]  [address]  [address]  [place] [address]  [phone]    2022    Re: Krishna Dugan   :   1955  MRN:  [id number]   REFERRING PHYSICIAN:   Izabella FAN [facility] SERVICES [place]    Date of Initial Evaluation:  2021  Visits:  Rxs Used: 27  Reason for Referral:     S/P rotator cuff repair  Acute pain of left shoulder    EVALUATION SUMMARY    Subjective:  HPI  Physical Exam                  Objective:  System    Physical Exam    General     ROS    Assessment/Plan:    PROGRESS  REPORT    Progress reporting period is from 21 to 22.       SUBJECTIVE  Patient reports minimal change in his shoulder over the past couple months.  Frustrated with delayed recovery.  Admits he has only been doing stretching exercises once a day versus 3x/day as instructed.  Complains of ongoing limitations with overhead reaching/lifting.         Current pain level is 5/10  .     Previous pain level was  6/10   Changes in function:  None  Adverse reaction to treatment or activity: None    OBJECTIVE    L SHOULDER AROM PROM STRENGTH   Flex 125 153 4+/5   Abduction 125 140 4+/5   IR/Ext T12 58 5/5   ER 45 62 5/5     Mobility: ongoing hypomobility exists with A/P and inferior glides  Scapular Stability: improving; dynamic dyskinesis less pronounced  ASSESSMENT/PLAN  Updated problem list and treatment plan: Diagnosis 1:  Left Shoulder Pain S/P RCR   Pain -  self management, education and home program  Decreased ROM/flexibility - manual therapy and therapeutic exercise  Decreased joint mobility - manual therapy and therapeutic exercise  Decreased strength - therapeutic exercise  Impaired muscle performance - neuro re-education  Decreased function - home program    STG/LTGs have been met or progress has been made towards goals:  None  Assessment of Progress: patient is progressing slower than expected  Self Management  Plans:  Patient has been instructed in a home treatment program.  I have re-evaluated this patient and find that the nature, scope, duration and intensity of the therapy is appropriate for the medical condition of the patient.  Krishna continues to require the following intervention to meet STG and LTG's:  PT,MD    Recommendations:  Would like you to re-assess and advise.  ROM measurements above, though slightly improved since November, reflect minimal change.     Thank you for your referral.    INQUIRIES  Therapist: Gloria Khan PT  [address]  [facility] [address]  Phone: [phone]  Fax: [phone]

## 2022-01-20 ENCOUNTER — THERAPY VISIT (OUTPATIENT)
Dept: PHYSICAL THERAPY | Facility: CLINIC | Age: 67
End: 2022-01-20
Payer: OTHER MISCELLANEOUS

## 2022-01-20 DIAGNOSIS — Z98.890 S/P ROTATOR CUFF REPAIR: ICD-10-CM

## 2022-01-20 DIAGNOSIS — M25.512 ACUTE PAIN OF LEFT SHOULDER: ICD-10-CM

## 2022-01-20 PROCEDURE — 97110 THERAPEUTIC EXERCISES: CPT | Mod: GP | Performed by: PHYSICAL THERAPIST

## 2022-01-20 PROCEDURE — 97140 MANUAL THERAPY 1/> REGIONS: CPT | Mod: GP | Performed by: PHYSICAL THERAPIST

## 2022-01-27 ENCOUNTER — THERAPY VISIT (OUTPATIENT)
Dept: PHYSICAL THERAPY | Facility: CLINIC | Age: 67
End: 2022-01-27
Payer: OTHER MISCELLANEOUS

## 2022-01-27 DIAGNOSIS — M25.512 ACUTE PAIN OF LEFT SHOULDER: ICD-10-CM

## 2022-01-27 DIAGNOSIS — Z98.890 S/P ROTATOR CUFF REPAIR: ICD-10-CM

## 2022-01-27 PROCEDURE — 97140 MANUAL THERAPY 1/> REGIONS: CPT | Mod: GP | Performed by: PHYSICAL THERAPIST

## 2022-01-27 PROCEDURE — 97110 THERAPEUTIC EXERCISES: CPT | Mod: GP | Performed by: PHYSICAL THERAPIST

## 2022-01-27 PROCEDURE — 97112 NEUROMUSCULAR REEDUCATION: CPT | Mod: GP | Performed by: PHYSICAL THERAPIST

## 2022-01-27 NOTE — PROGRESS NOTES
SUBJECTIVE  Subjective: Still hurts at times and it gets frustrating to him that he doesn't have better use of his arm yet.  Feels there is small progress in strength and ROM but it's so slow   Current Pain level: 4/10   Changes in function:  Yes (See Goal flowsheet attached for changes in current functional level)     Adverse reaction to treatment or activity:  None    OBJECTIVE  Objective: AROM L shoulder flex: 136, ABD (scaption): 142, ER: 48.  Less shrug than seen in the past with overhead elevation.  And less painful with end range PROM.       ASSESSMENT  Krishna continues to require intervention to meet STG and LTG's: PT  Patient is progressing as expected.  I had not worked with Brent in over 2 months but there is significant improvement from that time  Response to therapy has shown an improvement in  pain level, ROM  and strength  Progress made towards STG/LTG?  Yes (See Goal flowsheet attached for updates on achievement of STG and LTG)    PLAN  Current treatment program is being advanced to more complex exercises.    PTA/ATC plan:  N/A    Please refer to the daily flowsheet for treatment today, total treatment time and time spent performing 1:1 timed codes.

## 2022-02-03 ENCOUNTER — THERAPY VISIT (OUTPATIENT)
Dept: PHYSICAL THERAPY | Facility: CLINIC | Age: 67
End: 2022-02-03
Payer: OTHER MISCELLANEOUS

## 2022-02-03 DIAGNOSIS — M25.512 ACUTE PAIN OF LEFT SHOULDER: ICD-10-CM

## 2022-02-03 DIAGNOSIS — Z98.890 S/P ROTATOR CUFF REPAIR: ICD-10-CM

## 2022-02-03 PROCEDURE — 97140 MANUAL THERAPY 1/> REGIONS: CPT | Mod: GP | Performed by: PHYSICAL THERAPIST

## 2022-02-03 PROCEDURE — 97110 THERAPEUTIC EXERCISES: CPT | Mod: GP | Performed by: PHYSICAL THERAPIST

## 2022-02-03 PROCEDURE — 97112 NEUROMUSCULAR REEDUCATION: CPT | Mod: GP | Performed by: PHYSICAL THERAPIST

## 2022-02-10 ENCOUNTER — THERAPY VISIT (OUTPATIENT)
Dept: PHYSICAL THERAPY | Facility: CLINIC | Age: 67
End: 2022-02-10
Payer: OTHER MISCELLANEOUS

## 2022-02-10 DIAGNOSIS — Z98.890 S/P ROTATOR CUFF REPAIR: ICD-10-CM

## 2022-02-10 DIAGNOSIS — M25.512 ACUTE PAIN OF LEFT SHOULDER: ICD-10-CM

## 2022-02-10 PROCEDURE — 97112 NEUROMUSCULAR REEDUCATION: CPT | Mod: GP | Performed by: PHYSICAL THERAPIST

## 2022-02-10 PROCEDURE — 97110 THERAPEUTIC EXERCISES: CPT | Mod: GP | Performed by: PHYSICAL THERAPIST

## 2022-02-10 PROCEDURE — 97140 MANUAL THERAPY 1/> REGIONS: CPT | Mod: GP | Performed by: PHYSICAL THERAPIST

## 2022-02-17 ENCOUNTER — THERAPY VISIT (OUTPATIENT)
Dept: PHYSICAL THERAPY | Facility: CLINIC | Age: 67
End: 2022-02-17
Payer: OTHER MISCELLANEOUS

## 2022-02-17 DIAGNOSIS — M25.512 ACUTE PAIN OF LEFT SHOULDER: ICD-10-CM

## 2022-02-17 DIAGNOSIS — Z98.890 S/P ROTATOR CUFF REPAIR: ICD-10-CM

## 2022-02-17 PROCEDURE — 97140 MANUAL THERAPY 1/> REGIONS: CPT | Mod: GP | Performed by: PHYSICAL THERAPIST

## 2022-02-17 PROCEDURE — 97110 THERAPEUTIC EXERCISES: CPT | Mod: GP | Performed by: PHYSICAL THERAPIST

## 2022-02-18 DIAGNOSIS — I10 BENIGN ESSENTIAL HYPERTENSION: ICD-10-CM

## 2022-02-18 RX ORDER — LISINOPRIL 20 MG/1
TABLET ORAL
Qty: 90 TABLET | Refills: 0 | Status: SHIPPED | OUTPATIENT
Start: 2022-02-18 | End: 2022-05-24

## 2022-02-18 NOTE — TELEPHONE ENCOUNTER
Pending Prescriptions:                       Disp   Refills    lisinopril (ZESTRIL) 20 MG tablet [Pharma*90 tab*0            Sig: TAKE 1 TABLET(20 MG) BY MOUTH DAILY    Medication is being filled for 1 time radha refill only due to:  Patient is due for annual and med check before out    Please call and help schedule.  Thank you!

## 2022-02-24 ENCOUNTER — THERAPY VISIT (OUTPATIENT)
Dept: PHYSICAL THERAPY | Facility: CLINIC | Age: 67
End: 2022-02-24
Payer: OTHER MISCELLANEOUS

## 2022-02-24 DIAGNOSIS — Z98.890 S/P ROTATOR CUFF REPAIR: ICD-10-CM

## 2022-02-24 DIAGNOSIS — M25.512 ACUTE PAIN OF LEFT SHOULDER: ICD-10-CM

## 2022-02-24 PROCEDURE — 97110 THERAPEUTIC EXERCISES: CPT | Mod: GP | Performed by: PHYSICAL THERAPIST

## 2022-02-24 PROCEDURE — 97112 NEUROMUSCULAR REEDUCATION: CPT | Mod: GP | Performed by: PHYSICAL THERAPIST

## 2022-02-24 NOTE — PROGRESS NOTES
SUBJECTIVE  Subjective: Still doesn't feel as strong as he'd like lifting overhead.  Back has been so sore lately he has been focused on that   Current Pain level: 2/10   Changes in function:  Yes (See Goal flowsheet attached for changes in current functional level)     Adverse reaction to treatment or activity:  None    OBJECTIVE  Objective: AROM L shoulder flex: 141 (without upper trap substitution).  Able to lift 3# overhead with ease     ASSESSMENT  Krishna continues to require intervention to meet STG and LTG's: PT  Patient is progressing as expected.  Response to therapy has shown an improvement in  pain level, ROM  and strength  Progress made towards STG/LTG?  Yes (See Goal flowsheet attached for updates on achievement of STG and LTG)    PLAN  Current treatment program is being advanced to more complex exercises.    PTA/ATC plan:  N/A    Please refer to the daily flowsheet for treatment today, total treatment time and time spent performing 1:1 timed codes.

## 2022-03-03 ENCOUNTER — THERAPY VISIT (OUTPATIENT)
Dept: PHYSICAL THERAPY | Facility: CLINIC | Age: 67
End: 2022-03-03
Payer: OTHER MISCELLANEOUS

## 2022-03-03 DIAGNOSIS — M25.512 ACUTE PAIN OF LEFT SHOULDER: ICD-10-CM

## 2022-03-03 DIAGNOSIS — Z98.890 S/P ROTATOR CUFF REPAIR: ICD-10-CM

## 2022-03-03 PROCEDURE — 97112 NEUROMUSCULAR REEDUCATION: CPT | Mod: GP | Performed by: PHYSICAL THERAPIST

## 2022-03-03 PROCEDURE — 97110 THERAPEUTIC EXERCISES: CPT | Mod: GP | Performed by: PHYSICAL THERAPIST

## 2022-03-04 ENCOUNTER — OFFICE VISIT (OUTPATIENT)
Dept: FAMILY MEDICINE | Facility: CLINIC | Age: 67
End: 2022-03-04
Payer: COMMERCIAL

## 2022-03-04 VITALS
RESPIRATION RATE: 16 BRPM | SYSTOLIC BLOOD PRESSURE: 126 MMHG | BODY MASS INDEX: 32.48 KG/M2 | DIASTOLIC BLOOD PRESSURE: 62 MMHG | TEMPERATURE: 98.3 F | HEART RATE: 96 BPM | WEIGHT: 213.56 LBS | OXYGEN SATURATION: 97 %

## 2022-03-04 DIAGNOSIS — M54.6 ACUTE RIGHT-SIDED THORACIC BACK PAIN: Primary | ICD-10-CM

## 2022-03-04 PROCEDURE — 99213 OFFICE O/P EST LOW 20 MIN: CPT | Performed by: STUDENT IN AN ORGANIZED HEALTH CARE EDUCATION/TRAINING PROGRAM

## 2022-03-04 RX ORDER — NAPROXEN 500 MG/1
500 TABLET ORAL 2 TIMES DAILY WITH MEALS
Qty: 30 TABLET | Refills: 0 | Status: SHIPPED | OUTPATIENT
Start: 2022-03-04 | End: 2022-03-23

## 2022-03-04 NOTE — PROGRESS NOTES
"  Assessment & Plan     Acute right-sided thoracic back pain  It appears musculoskeletal in nature.  Without acute injury.  We will have him start naproxen as needed for short time and see physical therapy which she is already going for shoulder.  Follow-up if not improving.  - naproxen (NAPROSYN) 500 MG tablet  Dispense: 30 tablet; Refill: 0  - Physical Therapy Referral           BMI:   Estimated body mass index is 32.48 kg/m  as calculated from the following:    Height as of 3/15/21: 1.727 m (5' 7.99\").    Weight as of this encounter: 96.9 kg (213 lb 9 oz).   Return if symptoms worsen or fail to improve.    Shaun Caro MD  [facility] JOLYNN Kennedy is a 66 year old who presents for the following health issues     History of Present Illness       Back Pain:  He presents for follow up of back pain. Patient's back pain is a chronic problem.  Location of back pain:  Right middle of back and left middle of back  Description of back pain: dull ache and gnawing  Back pain spreads: nowhere    Since patient first noticed back pain, pain is: always present, but gets better and worse  Does back pain interfere with his job:  Not applicable      He eats 0-1 servings of fruits and vegetables daily.He consumes 2 sweetened beverage(s) daily.He exercises with enough effort to increase his heart rate 9 or less minutes per day.  He exercises with enough effort to increase his heart rate 3 or less days per week.   He is taking medications regularly.     No specific injury that he knows of.  He has had pain in his back on and off for years.  Has been more constant over the last 3 weeks.  Achy down to 1 most the time but does get sharp with certain movements.  No symptoms into his legs.  No change in bowel or bladder.  No significant weight changes recently.  It does look like he should follow-up for regular cancer screenings.    Review of Systems   Constitutional, HEENT, cardiovascular, " pulmonary, gi and gu systems are negative, except as otherwise noted.      Objective    /62 (BP Location: Left arm, Patient Position: Sitting, Cuff Size: Adult Large)   Pulse 96   Temp 98.3  F (36.8  C) (Temporal)   Resp 16   Wt 96.9 kg (213 lb 9 oz)   SpO2 97%   BMI 32.48 kg/m    Body mass index is 32.48 kg/m .  Physical Exam   GENERAL: healthy, alert and no distress  EYES: Eyes grossly normal to inspection, PERRL and conjunctivae and sclerae normal  HENT: hearing grossly intact  RESP: lbreathing comfortably room air  CV: No extremity edema  MS: no gross musculoskeletal defects noted, no edema, right-sided low thoracic paraspinal tenderness to palpation.  No spinal process tenderness to palpation.  SKIN: no suspicious lesions or rashes  NEURO: Normal strength and tone, mentation intact and speech normal  PSYCH: mentation appears normal, affect normal/bright

## 2022-03-10 ENCOUNTER — THERAPY VISIT (OUTPATIENT)
Dept: PHYSICAL THERAPY | Facility: CLINIC | Age: 67
End: 2022-03-10
Payer: OTHER MISCELLANEOUS

## 2022-03-10 DIAGNOSIS — M25.512 ACUTE PAIN OF LEFT SHOULDER: ICD-10-CM

## 2022-03-10 DIAGNOSIS — Z98.890 S/P ROTATOR CUFF REPAIR: ICD-10-CM

## 2022-03-10 PROCEDURE — 97140 MANUAL THERAPY 1/> REGIONS: CPT | Mod: GP | Performed by: PHYSICAL THERAPIST

## 2022-03-10 PROCEDURE — 97112 NEUROMUSCULAR REEDUCATION: CPT | Mod: GP | Performed by: PHYSICAL THERAPIST

## 2022-03-10 PROCEDURE — 97110 THERAPEUTIC EXERCISES: CPT | Mod: GP | Performed by: PHYSICAL THERAPIST

## 2022-03-17 ENCOUNTER — THERAPY VISIT (OUTPATIENT)
Dept: PHYSICAL THERAPY | Facility: CLINIC | Age: 67
End: 2022-03-17
Payer: OTHER MISCELLANEOUS

## 2022-03-17 DIAGNOSIS — M25.512 ACUTE PAIN OF LEFT SHOULDER: ICD-10-CM

## 2022-03-17 DIAGNOSIS — Z98.890 S/P ROTATOR CUFF REPAIR: ICD-10-CM

## 2022-03-17 PROCEDURE — 97112 NEUROMUSCULAR REEDUCATION: CPT | Mod: GP | Performed by: PHYSICAL THERAPIST

## 2022-03-17 PROCEDURE — 97110 THERAPEUTIC EXERCISES: CPT | Mod: GP | Performed by: PHYSICAL THERAPIST

## 2022-03-17 NOTE — PROGRESS NOTES
Subjective:  HPI  Physical Exam                    Objective:  System    Physical Exam    General     ROS    Assessment/Plan:    DISCHARGE  REPORT    Progress reporting period is from 1/13/22 to 3/17/22       SUBJECTIVE  Patient reports moderate improvement in his left shoulder over the past couple months; 70% improvement overall since the start of therapy. He is able to reach further overhead with left arm, but continues to experience weakness when trying to lift anything > 8# overhead.  Had an episode of increased shoulder pain this past weekend [place] [employer]. Symptoms eventually settled down later in the evening, but he's still puzzled as to what caused the pain in the first place.  Left hand remains a little swollen in comparison to the right.     Current pain level is 2/10  .     Previous pain level was  5/10  Changes in function:  Yes (See Goal flowsheet attached for changes in current functional level)  Adverse reaction to treatment or activity: None    OBJECTIVE  * denotes pain  LEFT SHOULDER AROM PROM STRENGTH   Flexion 140 157 5/5   Abduction 143 150 5/5   IR T10 55 5/5   ER 60 72* 5/5     Scapulohumeral Rhythm: asymmetrical with scapular dyskinesis, although improved   Flexibility: increased in pec and latissimus dorsi musculature.  Decreased capsular restrictions in G-H joint    ASSESSMENT/PLAN  Updated problem list and treatment plan: Diagnosis 1:  Left Shoulder Pain S/P RCR  Pain -  self management, education and home program  Decreased ROM/flexibility - manual therapy and therapeutic exercise  Decreased joint mobility - manual therapy and therapeutic exercise  Decreased strength - therapeutic exercise  Decreased function - home program    STG/LTGs have been met or progress has been made towards goals:  Yes (See Goal flow sheet completed today.)  Assessment of Progress: The patient's progress has slowed.  Self Management Plans:  Patient has been instructed in a home  treatment program.  I have re-evaluated this patient and find that the nature, scope, duration and intensity of the therapy is appropriate for the medical condition of the patient.  Krishna continues to require the following intervention to meet STG and LTG's: HEP    Recommendations:  This patient is ready to be discharged from therapy to continue his home exercise program. I anticipate that his overhead lifting tolerance will continue to improve with time as joint mechanics and strength improve.  Encouraged patient to call if he has any questions or concerns.      Please refer to the daily flowsheet for treatment today, total treatment time and time spent performing 1:1 timed codes.

## 2022-03-17 NOTE — LETTER
[facility] [place]  [address]  [address]  [place] [address]  [phone]    2022    Re: Krishna Dugan   :   1955  MRN:  [id number]   REFERRING PHYSICIAN:   Izabella F[facility] [place]    Date of Initial Evaluation:  2021  Visits:  Rxs Used: 36  Reason for Referral:     S/P rotator cuff repair  Acute pain of left shoulder    EVALUATION SUMMARY    Subjective:  HPI  Physical Exam                    Objective:  System    Physical Exam    General     ROS    Assessment/Plan:    DISCHARGE  REPORT    Progress reporting period is from 22 to 3/17/22       SUBJECTIVE  Patient reports moderate improvement in his left shoulder over the past couple months; 70% improvement overall since the start of therapy. He is able to reach further overhead with left arm, but continues to experience weakness when trying to lift anything > 8# overhead.  Had an episode of increased shoulder pain this past weekend while [place] [employer]. Symptoms eventually settled down later in the evening, but he's still puzzled as to what caused the pain in the first place.  Left hand remains a little swollen in comparison to the right.   Current pain level is 2/10  .     Previous pain level was  5/10  Changes in function:  Yes (See Goal flowsheet attached for changes in current functional level)  Adverse reaction to treatment or activity: None  Re: Krishna Dugan   :   1955    OBJECTIVE  * denotes pain  LEFT SHOULDER AROM PROM STRENGTH   Flexion 140 157 5/5   Abduction 143 150 5/5   IR T10 55 5/5   ER 60 72* 5/5     Scapulohumeral Rhythm: asymmetrical with scapular dyskinesis, although improved   Flexibility: increased in pec and latissimus dorsi musculature.  Decreased capsular restrictions in G-H joint    ASSESSMENT/PLAN  Updated problem list and treatment plan: Diagnosis 1:  Left Shoulder Pain S/P  RCR  Pain -  self management, education and home program  Decreased ROM/flexibility - manual therapy and therapeutic exercise  Decreased joint mobility - manual therapy and therapeutic exercise  Decreased strength - therapeutic exercise  Decreased function - home program    STG/LTGs have been met or progress has been made towards goals:  Yes (See Goal flow sheet completed today.)  Assessment of Progress: The patient's progress has slowed.  Self Management Plans:  Patient has been instructed in a home treatment program.  I have re-evaluated this patient and find that the nature, scope, duration and intensity of the therapy is appropriate for the medical condition of the patient.  Krishna continues to require the following intervention to meet STG and LTG's: HEP    Recommendations:  This patient is ready to be discharged from therapy to continue his home exercise program. I anticipate that his overhead lifting tolerance will continue to improve with time as joint mechanics and strength improve.  Encouraged patient to call if he has any questions or concerns.    Thank you for your referral.    INQUIRIES  Therapist: Gloria Khan PT   [facility]  [address] [place] [address] [address]  [facility] [address]  Phone: [phone]  Fax: [phone]

## 2022-03-18 DIAGNOSIS — M54.6 ACUTE RIGHT-SIDED THORACIC BACK PAIN: ICD-10-CM

## 2022-03-22 NOTE — TELEPHONE ENCOUNTER
Pending Prescriptions:                       Disp   Refills    naproxen (NAPROSYN) 500 MG tablet [Pharmac*30 tab*0        Sig: TAKE 1 TABLET(500 MG) BY MOUTH TWICE DAILY WITH MEALS      Routing refill request to provider for review/approval because:   Normal ALT on file in past 12 months    Normal AST on file in past 12 months    Patient is age 6-64 years    Normal CBC on file in past 12 months       Alexus King RN

## 2022-03-23 RX ORDER — NAPROXEN 500 MG/1
TABLET ORAL
Qty: 30 TABLET | Refills: 0 | Status: SHIPPED | OUTPATIENT
Start: 2022-03-23 | End: 2022-12-02

## 2022-04-24 ENCOUNTER — HEALTH MAINTENANCE LETTER (OUTPATIENT)
Age: 67
End: 2022-04-24

## 2022-05-22 DIAGNOSIS — I10 BENIGN ESSENTIAL HYPERTENSION: ICD-10-CM

## 2022-05-24 RX ORDER — LISINOPRIL 20 MG/1
TABLET ORAL
Qty: 90 TABLET | Refills: 0 | Status: SHIPPED | OUTPATIENT
Start: 2022-05-24 | End: 2022-08-25

## 2022-05-24 NOTE — TELEPHONE ENCOUNTER
Pending Prescriptions:                       Disp   Refills    lisinopril (ZESTRIL) 20 MG tablet [Pharma*90 tab*0            Sig: TAKE 1 TABLET(20 MG) BY MOUTH DAILY    Medication is being filled for 1 time radha refill only due to:  Patient is due for labs    Please call and help schedule.  Thank you!

## 2022-07-29 ENCOUNTER — OFFICE VISIT (OUTPATIENT)
Dept: FAMILY MEDICINE | Facility: CLINIC | Age: 67
End: 2022-07-29
Payer: COMMERCIAL

## 2022-07-29 VITALS
SYSTOLIC BLOOD PRESSURE: 138 MMHG | BODY MASS INDEX: 32.31 KG/M2 | DIASTOLIC BLOOD PRESSURE: 80 MMHG | HEART RATE: 72 BPM | RESPIRATION RATE: 20 BRPM | WEIGHT: 213.2 LBS | OXYGEN SATURATION: 99 % | TEMPERATURE: 97.2 F | HEIGHT: 68 IN

## 2022-07-29 DIAGNOSIS — R09.81 NASAL CONGESTION: Primary | ICD-10-CM

## 2022-07-29 DIAGNOSIS — R05.9 COUGH: ICD-10-CM

## 2022-07-29 DIAGNOSIS — L60.0 INGROWN RIGHT BIG TOENAIL: ICD-10-CM

## 2022-07-29 PROCEDURE — 99213 OFFICE O/P EST LOW 20 MIN: CPT | Performed by: FAMILY MEDICINE

## 2022-07-29 ASSESSMENT — ENCOUNTER SYMPTOMS: COUGH: 1

## 2022-07-29 ASSESSMENT — PAIN SCALES - GENERAL: PAINLEVEL: MILD PAIN (2)

## 2022-07-29 NOTE — PATIENT INSTRUCTIONS
1.  Saline sinus rinse (one form comes in a squirt bottle form while another kind is known as a Neti Pots).  Follow directions on package.  May do this 1-2 times per day.  2.  Flonase (fluticasone):  use as directed on package or prescription.  A good idea is to use this medication with saline nasal irrigation.  If you choose to do this, use the fluticasone about 30-45 minutes after the sinus rinse to maximize effect of medication.    3.  Antihistamines:  Daytime:  Zyrtec (cetirizine).  Nighttime:  Benadryl (diphenhydramine).  4.  Tylenol (acetaminophen) or Advil (ibuprofen) as needed for pain and/or fever.

## 2022-07-29 NOTE — PROGRESS NOTES
"  Assessment & Plan     ASSESSMENT/ORDERS:    ICD-10-CM    1. Nasal congestion  R09.81    2. Cough  R05.9    3. Ingrown right big toenail  L60.0      PLAN:  1.  See below for patient instructions for recommendations and discussion on management of nasal congestion and cough that is accompanying it.    2.  Continue once to twice daily soaks of right great toe.      Patient Instructions   1.  Saline sinus rinse (one form comes in a squirt bottle form while another kind is known as a Neti Pots).  Follow directions on package.  May do this 1-2 times per day.  2.  Flonase (fluticasone):  use as directed on package or prescription.  A good idea is to use this medication with saline nasal irrigation.  If you choose to do this, use the fluticasone about 30-45 minutes after the sinus rinse to maximize effect of medication.    3.  Antihistamines:  Daytime:  Zyrtec (cetirizine).  Nighttime:  Benadryl (diphenhydramine).  4.  Tylenol (acetaminophen) or Advil (ibuprofen) as needed for pain and/or fever.                    BMI:   Estimated body mass index is 32.42 kg/m  as calculated from the following:    Height as of this encounter: 1.727 m (5' 8\").    Weight as of this encounter: 96.7 kg (213 lb 3.2 oz).           Return in about 1 week (around 8/5/2022) for recheck if symptoms worsen or fail to improve.    Wagner Iglesias MD  [facility] JOLYNN Kennedy is a 67 year old, presenting for the following health issues:  Cough and Hearing Problem      Cough    Hearing Problem  Associated symptoms include coughing.            Symptoms x1 month.  Sinuses, ears.  Got COVID-19 last month. Hasn't felt well since 1 week before he had COVID-19.  Did have fevers with the positive COVID-19 test and was taking acetaminophen for this.  Got paxlovid as well.  Continues to have sinus symptoms.  Symptoms staying the same.      Has ingrown right toenail.  Has improved some.    Review of Systems   Respiratory: " "Positive for cough.             Objective    /80 (BP Location: Right arm, Patient Position: Chair, Cuff Size: Adult Large)   Pulse 72   Temp 97.2  F (36.2  C) (Temporal)   Resp 20   Ht 1.727 m (5' 8\")   Wt 96.7 kg (213 lb 3.2 oz)   SpO2 99%   BMI 32.42 kg/m    Body mass index is 32.42 kg/m .  Physical Exam  Constitutional:       Appearance: He is well-developed.   HENT:      Head: Normocephalic.      Right Ear: Tympanic membrane, ear canal and external ear normal.      Left Ear: Tympanic membrane, ear canal and external ear normal.      Nose: Mucosal edema and rhinorrhea present.      Right Sinus: No maxillary sinus tenderness or frontal sinus tenderness.      Left Sinus: No maxillary sinus tenderness or frontal sinus tenderness.      Mouth/Throat:      Mouth: Mucous membranes are not dry.      Pharynx: Uvula midline. No oropharyngeal exudate.      Tonsils: No tonsillar exudate.   Eyes:      General: Lids are normal.         Right eye: No discharge.         Left eye: No discharge.      Conjunctiva/sclera: Conjunctivae normal.   Neck:      Thyroid: No thyromegaly.   Cardiovascular:      Rate and Rhythm: Normal rate and regular rhythm.      Heart sounds: Normal heart sounds, S1 normal and S2 normal. No murmur heard.  Pulmonary:      Effort: Pulmonary effort is normal. No respiratory distress.      Breath sounds: Normal breath sounds. No wheezing, rhonchi or rales.   Musculoskeletal:      Cervical back: Normal range of motion and neck supple.   Lymphadenopathy:      Cervical: No cervical adenopathy.   Skin:     General: Skin is warm.      Findings: No erythema or rash.      Comments: Right great toenail with medial corner catching under skin of paronychia.  No infection.   Neurological:      Mental Status: He is alert.                            .  ..  "

## 2022-07-29 NOTE — NURSING NOTE
Health Maintenance Due   Topic Date Due     ANNUAL REVIEW OF HM ORDERS  Never done     COLORECTAL CANCER SCREENING  Never done     ZOSTER IMMUNIZATION (1 of 2) Never done     LUNG CANCER SCREENING  Never done     Pneumococcal Vaccine: 65+ Years (2 - PPSV23 or PCV20) 12/12/2020     MEDICARE ANNUAL WELLNESS VISIT  03/15/2022     FALL RISK ASSESSMENT  03/15/2022     COVID-19 Vaccine (4 - Booster for Moderna series) 04/02/2022     Alondra XIE LPN

## 2022-08-22 DIAGNOSIS — I10 BENIGN ESSENTIAL HYPERTENSION: ICD-10-CM

## 2022-08-25 RX ORDER — LISINOPRIL 20 MG/1
TABLET ORAL
Qty: 30 TABLET | Refills: 0 | Status: SHIPPED | OUTPATIENT
Start: 2022-08-25 | End: 2022-09-01

## 2022-08-25 NOTE — TELEPHONE ENCOUNTER
"Pending Prescriptions:                       Disp   Refills    lisinopril (ZESTRIL) 20 MG tablet [Pharmac*90 tab*0        Sig: TAKE 1 TABLET(20 MG) BY MOUTH DAILY        Routing refill request to provider for review/approval because:      ACE Inhibitors (Including Combos) Protocol Failed    Rerun Protocol (8/22/2022 3:50 AM)      Normal serum creatinine on file in past 12 months        Recent Labs   Lab Test 04/23/21  1032   CR 1.16      Ok to refill medication if creatinine is low      Normal serum potassium on file in past 12 months        Recent Labs   Lab Test 04/23/21  1032   POTASSIUM 4.6         Blood pressure under 140/90 in past 12 months        BP Readings from Last 3 Encounters:   07/29/22 138/80   03/04/22 126/62   04/23/21 118/64             Recent (12 mo) or future (30 days) visit within the authorizing provider's specialty    Patient has had an office visit with the authorizing provider or a provider within the authorizing providers department within the previous 12 mos or has a future within next 30 days. See \"Patient Info\" tab in inbasket, or \"Choose Columns\" in Meds & Orders section of the refill encounter.           Medication is active on med list          Patient is age 18 or older      "

## 2022-08-26 ENCOUNTER — TELEPHONE (OUTPATIENT)
Dept: FAMILY MEDICINE | Facility: OTHER | Age: 67
End: 2022-08-26

## 2022-08-26 DIAGNOSIS — I10 BENIGN ESSENTIAL HYPERTENSION: ICD-10-CM

## 2022-08-26 NOTE — TELEPHONE ENCOUNTER
lisinopril (ZESTRIL) 20 MG tablet    Pharmacy message: The patient is requesting authorization to dispense a 90 day supply.    [facility]

## 2022-08-26 NOTE — TELEPHONE ENCOUNTER
See other encounter. Patient is overdue for a Medicare annual wellness exam.  Please schedule appointment.    GALDINO GonsalvesN, RN, PHN  [facility] ~ Registered Nurse  Clinic Triage ~ [facility]  August 26, 2022

## 2022-09-01 ENCOUNTER — TELEPHONE (OUTPATIENT)
Dept: FAMILY MEDICINE | Facility: OTHER | Age: 67
End: 2022-09-01

## 2022-09-01 DIAGNOSIS — I10 BENIGN ESSENTIAL HYPERTENSION: ICD-10-CM

## 2022-09-01 RX ORDER — LISINOPRIL 20 MG/1
20 TABLET ORAL DAILY
Qty: 5 TABLET | Refills: 0 | Status: SHIPPED | OUTPATIENT
Start: 2022-09-01 | End: 2022-09-16

## 2022-09-01 NOTE — TELEPHONE ENCOUNTER
Patient called to get a 5 day refill of his Lisinopril sent to the pharmacy [place]. He forgot his medication at home.   Refill send, reminder that he is due for a Wellness Physical.       Juve Howell RN

## 2022-09-10 DIAGNOSIS — I10 BENIGN ESSENTIAL HYPERTENSION: ICD-10-CM

## 2022-09-14 NOTE — TELEPHONE ENCOUNTER
"Pending Prescriptions:                       Disp   Refills    lisinopril (ZESTRIL) 20 MG tablet [Pharmac*5 tabl*0        Sig: TAKE 1 TABLET(20 MG) BY MOUTH DAILY        Routing refill request to provider for review/approval because:      ACE Inhibitors (Including Combos) Protocol Failed    Rerun Protocol (9/10/2022 4:00 AM)      Normal serum creatinine on file in past 12 months        Recent Labs   Lab Test 04/23/21  1032   CR 1.16      Ok to refill medication if creatinine is low      Normal serum potassium on file in past 12 months        Recent Labs   Lab Test 04/23/21  1032   POTASSIUM 4.6         Blood pressure under 140/90 in past 12 months        BP Readings from Last 3 Encounters:   07/29/22 138/80   03/04/22 126/62   04/23/21 118/64             Recent (12 mo) or future (30 days) visit within the authorizing provider's specialty    Patient has had an office visit with the authorizing provider or a provider within the authorizing providers department within the previous 12 mos or has a future within next 30 days. See \"Patient Info\" tab in inbasket, or \"Choose Columns\" in Meds & Orders section of the refill encounter.           Medication is active on med list          Patient is age 18 or older      "

## 2022-09-16 DIAGNOSIS — I10 BENIGN ESSENTIAL HYPERTENSION: ICD-10-CM

## 2022-09-16 RX ORDER — LISINOPRIL 20 MG/1
TABLET ORAL
Qty: 30 TABLET | Refills: 0 | Status: SHIPPED | OUTPATIENT
Start: 2022-09-16 | End: 2022-10-28

## 2022-09-16 NOTE — TELEPHONE ENCOUNTER
I did not see the patient in more than a year.  He is due for a physical.  Please schedule appointment .I sent a short prescription and then interim

## 2022-09-19 RX ORDER — LISINOPRIL 20 MG/1
TABLET ORAL
Qty: 5 TABLET | Refills: 0 | OUTPATIENT
Start: 2022-09-19

## 2022-10-26 DIAGNOSIS — I10 BENIGN ESSENTIAL HYPERTENSION: ICD-10-CM

## 2022-10-27 NOTE — TELEPHONE ENCOUNTER
"Pending Prescriptions:                       Disp   Refills    lisinopril (ZESTRIL) 20 MG tablet [Pharmac*30 tab*0        Sig: TAKE 1 TABLET(20 MG) BY MOUTH DAILY    Routing refill request to provider for review/approval because:  Labs not current:      Requested Prescriptions   Pending Prescriptions Disp Refills    lisinopril (ZESTRIL) 20 MG tablet [Pharmacy Med Name: LISINOPRIL 20MG TABLETS] 30 tablet 0     Sig: TAKE 1 TABLET(20 MG) BY MOUTH DAILY       ACE Inhibitors (Including Combos) Protocol Failed - 10/26/2022  2:40 PM        Failed - Normal serum creatinine on file in past 12 months     Recent Labs   Lab Test 04/23/21  1032   CR 1.16       Ok to refill medication if creatinine is low          Failed - Normal serum potassium on file in past 12 months     Recent Labs   Lab Test 04/23/21  1032   POTASSIUM 4.6             Passed - Blood pressure under 140/90 in past 12 months     BP Readings from Last 3 Encounters:   07/29/22 138/80   03/04/22 126/62   04/23/21 118/64                 Passed - Recent (12 mo) or future (30 days) visit within the authorizing provider's specialty     Patient has had an office visit with the authorizing provider or a provider within the authorizing providers department within the previous 12 mos or has a future within next 30 days. See \"Patient Info\" tab in inbasket, or \"Choose Columns\" in Meds & Orders section of the refill encounter.              Passed - Medication is active on med list        Passed - Patient is age 18 or older                   "

## 2022-10-28 RX ORDER — LISINOPRIL 20 MG/1
TABLET ORAL
Qty: 30 TABLET | Refills: 0 | Status: SHIPPED | OUTPATIENT
Start: 2022-10-28 | End: 2022-12-02

## 2022-10-28 NOTE — TELEPHONE ENCOUNTER
Patient is due for Medicare annual wellness exam.  Please schedule appointment.  Short prescription sent in the interim

## 2022-11-21 DIAGNOSIS — I10 BENIGN ESSENTIAL HYPERTENSION: ICD-10-CM

## 2022-11-25 RX ORDER — LISINOPRIL 20 MG/1
20 TABLET ORAL DAILY
Qty: 30 TABLET | Refills: 0 | OUTPATIENT
Start: 2022-11-25

## 2022-12-02 ENCOUNTER — TELEPHONE (OUTPATIENT)
Dept: FAMILY MEDICINE | Facility: OTHER | Age: 67
End: 2022-12-02

## 2022-12-02 ENCOUNTER — OFFICE VISIT (OUTPATIENT)
Dept: FAMILY MEDICINE | Facility: OTHER | Age: 67
End: 2022-12-02
Payer: COMMERCIAL

## 2022-12-02 VITALS
SYSTOLIC BLOOD PRESSURE: 128 MMHG | HEART RATE: 92 BPM | RESPIRATION RATE: 14 BRPM | OXYGEN SATURATION: 99 % | WEIGHT: 211.5 LBS | HEIGHT: 68 IN | BODY MASS INDEX: 32.05 KG/M2 | TEMPERATURE: 98 F | DIASTOLIC BLOOD PRESSURE: 76 MMHG

## 2022-12-02 DIAGNOSIS — R73.9 HYPERGLYCEMIA: ICD-10-CM

## 2022-12-02 DIAGNOSIS — Z00.00 ANNUAL PHYSICAL EXAM: Primary | ICD-10-CM

## 2022-12-02 DIAGNOSIS — E78.1 HYPERTRIGLYCERIDEMIA: ICD-10-CM

## 2022-12-02 DIAGNOSIS — Z12.11 SCREEN FOR COLON CANCER: ICD-10-CM

## 2022-12-02 DIAGNOSIS — G25.0 ESSENTIAL TREMOR: ICD-10-CM

## 2022-12-02 DIAGNOSIS — R61 ABNORMAL FLUSHING AND SWEATING: ICD-10-CM

## 2022-12-02 DIAGNOSIS — I10 BENIGN ESSENTIAL HYPERTENSION: ICD-10-CM

## 2022-12-02 DIAGNOSIS — E78.5 HYPERLIPIDEMIA, UNSPECIFIED HYPERLIPIDEMIA TYPE: ICD-10-CM

## 2022-12-02 DIAGNOSIS — K44.9 HIATAL HERNIA: ICD-10-CM

## 2022-12-02 DIAGNOSIS — I10 ESSENTIAL HYPERTENSION, BENIGN: ICD-10-CM

## 2022-12-02 DIAGNOSIS — E78.01 FAMILIAL HYPERCHOLESTEREMIA: ICD-10-CM

## 2022-12-02 DIAGNOSIS — R07.89 ATYPICAL CHEST PAIN: ICD-10-CM

## 2022-12-02 DIAGNOSIS — Z12.5 SCREENING FOR PROSTATE CANCER: ICD-10-CM

## 2022-12-02 DIAGNOSIS — Z00.00 ENCOUNTER FOR MEDICARE ANNUAL WELLNESS EXAM: ICD-10-CM

## 2022-12-02 DIAGNOSIS — R23.2 ABNORMAL FLUSHING AND SWEATING: ICD-10-CM

## 2022-12-02 DIAGNOSIS — K21.00 GASTROESOPHAGEAL REFLUX DISEASE WITH ESOPHAGITIS WITHOUT HEMORRHAGE: ICD-10-CM

## 2022-12-02 DIAGNOSIS — R00.2 PALPITATIONS: ICD-10-CM

## 2022-12-02 DIAGNOSIS — K21.9 GASTROESOPHAGEAL REFLUX DISEASE WITHOUT ESOPHAGITIS: ICD-10-CM

## 2022-12-02 PROBLEM — M25.512 ACUTE PAIN OF LEFT SHOULDER: Status: RESOLVED | Noted: 2021-06-01 | Resolved: 2022-12-02

## 2022-12-02 PROCEDURE — 99214 OFFICE O/P EST MOD 30 MIN: CPT | Mod: 25 | Performed by: FAMILY MEDICINE

## 2022-12-02 PROCEDURE — 90677 PCV20 VACCINE IM: CPT | Performed by: FAMILY MEDICINE

## 2022-12-02 PROCEDURE — 99397 PER PM REEVAL EST PAT 65+ YR: CPT | Mod: 25 | Performed by: FAMILY MEDICINE

## 2022-12-02 PROCEDURE — 93000 ELECTROCARDIOGRAM COMPLETE: CPT | Performed by: FAMILY MEDICINE

## 2022-12-02 PROCEDURE — 90471 IMMUNIZATION ADMIN: CPT | Performed by: FAMILY MEDICINE

## 2022-12-02 RX ORDER — LISINOPRIL 30 MG/1
30 TABLET ORAL DAILY
Qty: 90 TABLET | Refills: 3 | Status: SHIPPED | OUTPATIENT
Start: 2022-12-02 | End: 2024-02-09

## 2022-12-02 RX ORDER — OMEPRAZOLE 40 MG/1
CAPSULE, DELAYED RELEASE ORAL
Qty: 90 CAPSULE | Refills: 3 | Status: SHIPPED | OUTPATIENT
Start: 2022-12-02 | End: 2024-02-09

## 2022-12-02 RX ORDER — PROPRANOLOL HCL 60 MG
60 CAPSULE, EXTENDED RELEASE 24HR ORAL DAILY
Qty: 90 CAPSULE | Refills: 3 | Status: SHIPPED | OUTPATIENT
Start: 2022-12-02 | End: 2023-07-17

## 2022-12-02 RX ORDER — FENOFIBRATE 160 MG/1
160 TABLET ORAL DAILY
Qty: 90 TABLET | Refills: 3 | Status: SHIPPED | OUTPATIENT
Start: 2022-12-02 | End: 2023-07-17

## 2022-12-02 ASSESSMENT — ENCOUNTER SYMPTOMS
SORE THROAT: 0
MYALGIAS: 0
HEMATURIA: 0
SHORTNESS OF BREATH: 0
NERVOUS/ANXIOUS: 1
CHILLS: 0
FEVER: 0
JOINT SWELLING: 0
PARESTHESIAS: 1
CONSTIPATION: 0
EYE PAIN: 0
WEAKNESS: 0
HEARTBURN: 1
HEADACHES: 1
NAUSEA: 0
ARTHRALGIAS: 0
DYSURIA: 0
COUGH: 0
ABDOMINAL PAIN: 0
DIZZINESS: 0
PALPITATIONS: 1
DIARRHEA: 0
FREQUENCY: 1
HEMATOCHEZIA: 0

## 2022-12-02 ASSESSMENT — PAIN SCALES - GENERAL: PAINLEVEL: NO PAIN (0)

## 2022-12-02 ASSESSMENT — ACTIVITIES OF DAILY LIVING (ADL): CURRENT_FUNCTION: NO ASSISTANCE NEEDED

## 2022-12-02 NOTE — ASSESSMENT & PLAN NOTE
He reports increasing trouble with his tremor.  We will try and add propranolol to help with this.

## 2022-12-02 NOTE — ASSESSMENT & PLAN NOTE
Patient is allergic to statin.  He does have significantly elevated total cholesterol, triglycerides and LDL levels consistent with familial hypercholesterolemia increasing his risk for cardiac disease.  With his recent symptoms of increased flushing, I completed an EKG today which did not reveal any acute ST changes.  I encouraged him to complete a stress test to rule out coronary artery disease as a possible trigger.  Will await results before further recommendations.

## 2022-12-02 NOTE — PROGRESS NOTES
"SUBJECTIVE:   Brent is a 67 year old who presents for Preventive Visit.  Patient has been advised of split billing requirements and indicates understanding: Yes  Are you in the first 12 months of your Medicare coverage?  No    Healthy Habits:     In general, how would you rate your overall health?  Fair    Frequency of exercise:  None    Do you usually eat at least 4 servings of fruit and vegetables a day, include whole grains    & fiber and avoid regularly eating high fat or \"junk\" foods?  No    Taking medications regularly:  Yes    Medication side effects:  None    Ability to successfully perform activities of daily living:  No assistance needed    Home Safety:  No safety concerns identified    Hearing Impairment:  Difficulty following a conversation in a noisy restaurant or crowded room    In the past 6 months, have you been bothered by leaking of urine?  No    In general, how would you rate your overall mental or emotional health?  Fair      PHQ-2 Total Score: 1    Additional concerns today:  No      Have you ever done Advance Care Planning? (For example, a Health Directive, POLST, or a discussion with a medical provider or your loved ones about your wishes): No, advance care planning information given to patient to review.  Advanced care planning was discussed at today's visit.      Fall risk  Fallen 2 or more times in the past year?: No  Any fall with injury in the past year?: No  click delete button to remove this line now  Cognitive Screening   1) Repeat 3 items (Leader, Season, Table)    2) Clock draw: NORMAL  3) 3 item recall: Recalls 2 objects   Results: NORMAL clock, 1-2 items recalled: COGNITIVE IMPAIRMENT LESS LIKELY    Mini-CogTM Radha Mueller. Licensed by the author for use in [facility]; reprinted with permission (saniya@.[email or web address]). All rights reserved.      Do you have sleep apnea, excessive snoring or daytime drowsiness?: no    Reviewed and updated as needed this visit by clinical " staff                  Reviewed and updated as needed this visit by Provider                 Social History     Tobacco Use     Smoking status: Former     Packs/day: 0.50     Years: 10.00     Pack years: 5.00     Types: Cigarettes     Smokeless tobacco: Never   Substance Use Topics     Alcohol use: No     Comment: very little         Alcohol Use 12/2/2022   Prescreen: >3 drinks/day or >7 drinks/week? No           -------------------------------------    Current providers sharing in care for this patient include:   Patient Care Team:  Huong Tomas MD as PCP - General (Family Medicine)  Wagner Iglesias MD as Assigned PCP    The following health maintenance items are reviewed in Epic and correct as of today:  Health Maintenance   Topic Date Due     ANNUAL REVIEW OF  ORDERS  Never done     COLORECTAL CANCER SCREENING  Never done     ZOSTER IMMUNIZATION (1 of 2) Never done     LUNG CANCER SCREENING  Never done     Pneumococcal Vaccine: 65+ Years (2 - PPSV23 if available, else PCV20) 12/12/2020     MEDICARE ANNUAL WELLNESS VISIT  03/15/2022     FALL RISK ASSESSMENT  12/02/2023     LIPID  03/15/2026     ADVANCE CARE PLANNING  03/16/2026     DTAP/TDAP/TD IMMUNIZATION (3 - Td or Tdap) 11/21/2028     HEPATITIS C SCREENING  Completed     PHQ-2 (once per calendar year)  Completed     INFLUENZA VACCINE  Completed     AORTIC ANEURYSM SCREENING (SYSTEM ASSIGNED)  Completed     COVID-19 Vaccine  Completed     IPV IMMUNIZATION  Aged Out     MENINGITIS IMMUNIZATION  Aged Out     Labs reviewed in EPIC  BP Readings from Last 3 Encounters:   12/02/22 128/76   07/29/22 138/80   03/04/22 126/62    Wt Readings from Last 3 Encounters:   12/02/22 95.9 kg (211 lb 8 oz)   07/29/22 96.7 kg (213 lb 3.2 oz)   03/04/22 96.9 kg (213 lb 9 oz)                  Patient Active Problem List   Diagnosis     Corneal foreign body     Essential hypertension, benign     Hyperlipidemia     S/P colonoscopic polypectomy     Allergic rhinitis due  to animal dander     Allergic rhinitis due to dust mite     Abnormal involuntary movements     Gastroesophageal reflux disease without esophagitis     Other testicular hypofunction     Vitamin D deficiency     Essential tremor     Chronic right-sided low back pain without sciatica     Sexual dysfunction     S/P rotator cuff repair     Acute pain of left shoulder     No past surgical history on file.    Social History     Tobacco Use     Smoking status: Former     Packs/day: 0.50     Years: 10.00     Pack years: 5.00     Types: Cigarettes     Smokeless tobacco: Never   Substance Use Topics     Alcohol use: No     Comment: very little     No family history on file.      Current Outpatient Medications   Medication Sig Dispense Refill     azelastine (ASTELIN) 0.1 % nasal spray Spray 1-2 sprays into both nostrils 2 times daily 1 Bottle 3     fenofibrate (TRIGLIDE/LOFIBRA) 160 MG tablet Take 1 tablet (160 mg) by mouth daily 90 tablet 3     fluticasone (FLONASE) 50 MCG/ACT spray Spray 2 sprays into both nostrils daily 1 Bottle 11     lisinopril (ZESTRIL) 30 MG tablet Take 1 tablet (30 mg) by mouth daily 90 tablet 3     loratadine (CLARITIN) 10 MG tablet Take 1 tablet (10 mg) by mouth daily 90 tablet 3     omeprazole (PRILOSEC) 40 MG DR capsule TAKE 1 CAPSULE(40 MG) BY MOUTH DAILY WITH DINNER Strength: 40 mg 90 capsule 3     propranolol ER (INDERAL LA) 60 MG 24 hr capsule Take 1 capsule (60 mg) by mouth daily 90 capsule 3     tadalafil (CIALIS) 10 MG tablet Take 1 tablet (10 mg) by mouth daily as needed (erectile dysfunction) 30 tablet 0     VITAMIN D, CHOLECALCIFEROL, PO Take 5,000 Units by mouth daily       Allergies   Allergen Reactions     Statins [Hmg-Coa-R Inhibitors] Muscle Pain (Myalgia)     Valdecoxib      Ears turn purple  Facial flushing  Bextra     Pneumonia Vaccine:due for PCV20        Review of Systems   Constitutional: Negative for chills and fever.   HENT: Positive for hearing loss. Negative for congestion,  "ear pain and sore throat.    Eyes: Negative for pain and visual disturbance.   Respiratory: Negative for cough and shortness of breath.    Cardiovascular: Positive for chest pain, palpitations and peripheral edema.   Gastrointestinal: Positive for heartburn. Negative for abdominal pain, constipation, diarrhea, hematochezia and nausea.   Genitourinary: Positive for frequency and impotence. Negative for dysuria, genital sores, hematuria, penile discharge and urgency.   Musculoskeletal: Negative for arthralgias, joint swelling and myalgias.   Skin: Positive for rash.   Neurological: Positive for headaches and paresthesias. Negative for dizziness and weakness.   Psychiatric/Behavioral: Negative for mood changes. The patient is nervous/anxious.          OBJECTIVE:   There were no vitals taken for this visit. Estimated body mass index is 32.42 kg/m  as calculated from the following:    Height as of 7/29/22: 1.727 m (5' 8\").    Weight as of 7/29/22: 96.7 kg (213 lb 3.2 oz).  Physical Exam  GENERAL: healthy, alert and no distress  EYES: Eyes grossly normal to inspection, PERRL and conjunctivae and sclerae normal  HENT: ear canals and TM's normal, nose and mouth without ulcers or lesions  NECK: no adenopathy, no asymmetry, masses, or scars and thyroid normal to palpation  RESP: lungs clear to auscultation - no rales, rhonchi or wheezes  CV: regular rate and rhythm, normal S1 S2, no S3 or S4, no murmur, click or rub, no peripheral edema and peripheral pulses strong  ABDOMEN: soft, nontender, no hepatosplenomegaly, no masses and bowel sounds normal  MS: no gross musculoskeletal defects noted, no edema  SKIN: no suspicious lesions or rashes  NEURO: Normal strength and tone, mentation intact and speech normal  PSYCH: mentation appears normal, affect normal/bright    Diagnostic Test Results:  Labs reviewed in Epic    ASSESSMENT / PLAN:     Problem List Items Addressed This Visit     Essential hypertension, benign     Increase " lisinopril to 30 mg daily.  Recheck blood chemistries and cholesterol levels.  Discussed diet and lifestyle modifications.  Encouraged weight loss.         Relevant Medications    lisinopril (ZESTRIL) 30 MG tablet    Hyperlipidemia     Patient is allergic to statin.  He does have significantly elevated total cholesterol, triglycerides and LDL levels consistent with familial hypercholesterolemia increasing his risk for cardiac disease.  With his recent symptoms of increased flushing, I completed an EKG today which did not reveal any acute ST changes.  I encouraged him to complete a stress test to rule out coronary artery disease as a possible trigger.  Will await results before further recommendations.         Relevant Medications    fenofibrate (TRIGLIDE/LOFIBRA) 160 MG tablet    Gastroesophageal reflux disease without esophagitis     Patient reports that he has increased flushing with food intake.  He was previously told he had hiatal hernia and has been on chronic PPI treatment.  I do not see a baseline EGD on chart.  Encouraged to complete this for surveillance.  Continue PPI without changes.         Relevant Medications    omeprazole (PRILOSEC) 40 MG DR capsule    Essential tremor     He reports increasing trouble with his tremor.  We will try and add propranolol to help with this.         Relevant Medications    propranolol ER (INDERAL LA) 60 MG 24 hr capsule   Other Visit Diagnoses     Annual physical exam    -  Primary    Screen for colon cancer        Benign essential hypertension        Relevant Medications    lisinopril (ZESTRIL) 30 MG tablet    Other Relevant Orders    Lipid panel reflex to direct LDL Fasting    Comprehensive metabolic panel (BMP + Alb, Alk Phos, ALT, AST, Total. Bili, TP)    CBC with platelets    Screening for prostate cancer        Relevant Orders    PSA, screen    Hyperglycemia        Relevant Orders    Hemoglobin A1c    Gastroesophageal reflux disease with esophagitis without  "hemorrhage        Relevant Orders    Adult GI  Referral - Procedure Only    Hiatal hernia        Relevant Medications    omeprazole (PRILOSEC) 40 MG DR capsule    Hypertriglyceridemia        Relevant Medications    fenofibrate (TRIGLIDE/LOFIBRA) 160 MG tablet    Atypical chest pain        Familial hypercholesteremia        Relevant Medications    fenofibrate (TRIGLIDE/LOFIBRA) 160 MG tablet    Other Relevant Orders    NM Lexiscan stress test    Abnormal flushing and sweating        Relevant Orders    NM Lexiscan stress test    Palpitations        Relevant Orders    TSH with free T4 reflex    EKG 12-lead complete w/read - Clinics    Encounter for Medicare annual wellness exam              COUNSELING:  Reviewed preventive health counseling, as reflected in patient instructions       Regular exercise       Healthy diet/nutrition      BMI:   Estimated body mass index is 32.42 kg/m  as calculated from the following:    Height as of 7/29/22: 1.727 m (5' 8\").    Weight as of 7/29/22: 96.7 kg (213 lb 3.2 oz).         He reports that he has quit smoking. His smoking use included cigarettes. He has a 5.00 pack-year smoking history. He has never used smokeless tobacco.      Appropriate preventive services were discussed with this patient, including applicable screening as appropriate for cardiovascular disease, diabetes, osteopenia/osteoporosis, and glaucoma.  As appropriate for age/gender, discussed screening for colorectal cancer, prostate cancer, breast cancer, and cervical cancer. Checklist reviewing preventive services available has been given to the patient.    Reviewed patients plan of care and provided an AVS. The Basic Care Plan (routine screening as documented in Health Maintenance) for Krishna meets the Care Plan requirement. This Care Plan has been established and reviewed with the Patient.          Huong Tomas MD  [facility]    Identified Health Risks:    The patient was " provided with suggestions to help him develop a healthy physical lifestyle.  He is at risk for lack of exercise and has been provided with information to increase physical activity for the benefit of his well-being.  The patient was counseled and encouraged to consider modifying their diet and eating habits. He was provided with information on recommended healthy diet options.  The patient was provided with written information regarding signs of hearing loss.  The patient was provided with suggestions to help him develop a healthy emotional lifestyle.

## 2022-12-02 NOTE — ASSESSMENT & PLAN NOTE
Patient reports that he has increased flushing with food intake.  He was previously told he had hiatal hernia and has been on chronic PPI treatment.  I do not see a baseline EGD on chart.  Encouraged to complete this for surveillance.  Continue PPI without changes.

## 2022-12-02 NOTE — ASSESSMENT & PLAN NOTE
Increase lisinopril to 30 mg daily.  Recheck blood chemistries and cholesterol levels.  Discussed diet and lifestyle modifications.  Encouraged weight loss.

## 2022-12-02 NOTE — PATIENT INSTRUCTIONS
Patient Education   Personalized Prevention Plan  You are due for the preventive services outlined below.  Your care team is available to assist you in scheduling these services.  If you have already completed any of these items, please share that information with your care team to update in your medical record.  Health Maintenance Due   Topic Date Due     Colorectal Cancer Screening  Never done     Zoster (Shingles) Vaccine (1 of 2) Never done     Pneumococcal Vaccine (2 - PPSV23 if available, else PCV20) 12/12/2020     Annual Wellness Visit  03/15/2022     Your Health Risk Assessment indicates you feel you are not in good health    A healthy lifestyle helps keep the body fit and the mind alert. It helps protect you from disease, helps you fight disease, and helps prevent chronic disease (disease that doesn't go away) from getting worse. This is important as you get older and begin to notice twinges in muscles and joints and a decline in the strength and stamina you once took for granted. A healthy lifestyle includes good healthcare, good nutrition, weight control, recreation, and regular exercise. Avoid harmful substances and do what you can to keep safe. Another part of a healthy lifestyle is stay mentally active and socially involved.    Good healthcare     Have a wellness visit every year.     If you have new symptoms, let us know right away. Don't wait until the next checkup.     Take medicines exactly as prescribed and keep your medicines in a safe place. Tell us if your medicine causes problems.   Healthy diet and weight control     Eat 3 or 4 small, nutritious, low-fat, high-fiber meals a day. Include a variety of fruits, vegetables, and whole-grain foods.     Make sure you get enough calcium in your diet. Calcium, vitamin D, and exercise help prevent osteoporosis (bone thinning).     If you live alone, try eating with others when you can. That way you get a good meal and have company while you eat it.      Try to keep a healthy weight. If you eat more calories than your body uses for energy, it will be stored as fat and you will gain weight.     Recreation   Recreation is not limited to sports and team events. It includes any activity that provides relaxation, interest, enjoyment, and exercise. Recreation provides an outlet for physical, mental, and social energy. It can give a sense of worth and achievement. It can help you stay healthy.    Mental Exercise and Social Involvement  Mental and emotional health is as important as physical health. Keep in touch with friends and family. Stay as active as possible. Continue to learn and challenge yourself.   Things you can do to stay mentally active are:    Learn something new, like a foreign language or musical instrument.     Play SCRABBLE or do crossword puzzles. If you cannot find people to play these games with you at home, you can play them with others on your computer through the Internet.     Join a games club--anything from card games to chess or checkers or lawn bowling.     Start a new hobby.     Go back to school.     Volunteer.     Read.   Keep up with world events.    Exercise for a Healthier Heart  You may wonder how you can improve the health of your heart. If you re thinking about exercise, you re on the right track. You don t need to become an athlete. But you do need a certain amount of brisk exercise to help strengthen your heart. If you have been diagnosed with a heart condition, your healthcare provider may advise exercise to help stabilize your condition. To help make exercise a habit, choose safe, fun activities.      Exercise with a friend. When activity is fun, you're more likely to stick with it.   Before you start  Check with your healthcare provider before starting an exercise program. This is especially important if you have not been active for a while. It's also important if you have a long-term (chronic) health problem such as heart  disease, diabetes, or obesity. Or if you are at high risk for having these problems.   Why exercise?  Exercising regularly offers many healthy rewards. It can help you do all of the following:     Improve your blood cholesterol level to help prevent further heart trouble    Lower your blood pressure to help prevent a stroke or heart attack    Control diabetes, or reduce your risk of getting this disease    Improve your heart and lung function    Reach and stay at a healthy weight    Make your muscles stronger so you can stay active    Prevent falls and fractures by slowing the loss of bone mass (osteoporosis)    Manage stress better    Reduce your blood pressure    Improve your sense of self and your body image  Exercise tips      Ease into your routine. Set small goals. Then build on them. If you are not sure what your activity level should be, talk with your healthcare provider first before starting an exercise routine.    Exercise on most days. Aim for a total of 150 minutes (2 hours and 30 minutes) or more of moderate-intensity aerobic activity each week. Or 75 minutes (1 hour and 15 minutes) or more of vigorous-intensity aerobic activity each week. Or try for a combination of both. Moderate activity means that you breathe heavier and your heart rate increases but you can still talk. Think about doing 40 minutes of moderate exercise, 3 to 4 times a week. For best results, activity should last for about 40 minutes to lower blood pressure and cholesterol. It's OK to work up to the 40-minute period over time. Examples of moderate-intensity activity are walking 1 mile in 15 minutes. Or doing 30 to 45 minutes of yard work.    Step up your daily activity level.  Along with your exercise program, try being more active the whole day. Walk instead of drive. Or park further away so that you take more steps each day. Do more household tasks or yard work. You may not be able to meet the advised mount of physical activity.  But doing some moderate- or vigorous-intensity aerobic activity can help reduce your risk for heart disease. Your healthcare provider can help you figure out what is best for you.    Choose 1 or more activities you enjoy.  Walking is one of the easiest things you can do. You can also try swimming, riding a bike, dancing, or taking an exercise class.    When to call your healthcare provider  Call your healthcare provider if you have any of these:     Chest pain or feel dizzy or lightheaded    Burning, tightness, pressure, or heaviness in your chest, neck, shoulders, back, or arms    Abnormal shortness of breath    More joint or muscle pain    A very fast or irregular heartbeat (palpitations)  [employer] last reviewed this educational content on 7/1/2019 2000-2021 [employer]. All rights reserved. This information is not intended as a substitute for professional medical care. Always follow your healthcare professional's instructions.          Understanding USDA MyPlate  The USDA has guidelines to help you make healthy food choices. These are called MyPlate. MyPlate shows the food groups that make up healthy meals using the image of a place setting. Before you eat, think about the healthiest choices for what to put on your plate or in your cup or bowl. To learn more about building a healthy plate, visit www.choosemyplate.gov.    The food groups    Fruits. Any fruit or 100% fruit juice counts as part of the Fruit Group. Fruits may be fresh, canned, frozen, or dried, and may be whole, cut-up, or pureed. Make 1/2 of your plate fruits and vegetables.    Vegetables. Any vegetable or 100% vegetable juice counts as a member of the Vegetable Group. Vegetables may be fresh, frozen, canned, or dried. They can be served raw or cooked and may be whole, cut-up, or mashed. Make 1/2 of your plate fruits and vegetables.    Grains. All foods made from grains are part of the Grains Group. These include wheat, rice, oats,  cornmeal, and barley. Grains are often used to make foods such as bread, pasta, oatmeal, cereal, tortillas, and grits. Grains should be no more than 1/4 of your plate. At least half of your grains should be whole grains.    Protein. This group includes meat, poultry, seafood, beans and peas, eggs, processed soy products (such as tofu), nuts (including nut butters), and seeds. Make protein choices no more than 1/4 of your plate. Meat and poultry choices should be lean or low fat.    Dairy. The Dairy Group includes all fluid milk products and foods made from milk that contain calcium, such as yogurt and cheese. (Foods that have little calcium, such as cream, butter, and cream cheese, are not part of this group.) Most dairy choices should be low-fat or fat-free.    Oils. Oils aren't a food group, but they do contain essential nutrients. However it's important to watch your intake of oils. These are fats that are liquid at room temperature. They include canola, corn, olive, soybean, vegetable, and sunflower oil. Foods that are mainly oil include mayonnaise, certain salad dressings, and soft margarines. You likely already get your daily oil allowance from the foods you eat.  Things to limit  Eating healthy also means limiting these things in your diet:       Salt (sodium). Many processed foods have a lot of sodium. To keep sodium intake down, eat fresh vegetables, meats, poultry, and seafood when possible. Purchase low-sodium, reduced-sodium, or no-salt-added food products at the store. And don't add salt to your meals at home. Instead, season them with herbs and spices such as dill, oregano, cumin, and paprika. Or try adding flavor with lemon or lime zest and juice.    Saturated fat. Saturated fats are most often found in animal products such as beef, pork, and chicken. They are often solid at room temperature, such as butter. To reduce your saturated fat intake, choose leaner cuts of meat and poultry. And try healthier  cooking methods such as grilling, broiling, roasting, or baking. For a simple lower-fat swap, use plain nonfat yogurt instead of mayonnaise when making potato salad or macaroni salad.    Added sugars. These are sugars added to foods. They are in foods such as ice cream, candy, soda, fruit drinks, sports drinks, energy drinks, cookies, pastries, jams, and syrups. Cut down on added sugars by sharing sweet treats with a family member or friend. You can also choose fruit for dessert, and drink water or other unsweetened beverages.     [employer] last reviewed this educational content on 6/1/2020 2000-2021 [employer]. All rights reserved. This information is not intended as a substitute for professional medical care. Always follow your healthcare professional's instructions.          Signs of Hearing Loss      Hearing much better with one ear can be a sign of hearing loss.   Hearing loss is a problem shared by many people. In fact, it is one of the most common health problems, particularly as people age. Most people age 65 and older have some hearing loss. By age 80, almost everyone does. Hearing loss often occurs slowly over the years. So you may not realize your hearing has gotten worse.  Have your hearing checked  Call your healthcare provider if you:    Have to strain to hear normal conversation    Have to watch other people s faces very carefully to follow what they re saying    Need to ask people to repeat what they ve said    Often misunderstand what people are saying    Turn the volume of the television or radio up so high that others complain    Feel that people are mumbling when they re talking to you    Find that the effort to hear leaves you feeling tired and irritated    Notice, when using the phone, that you hear better with one ear than the other  [employer] last reviewed this educational content on 1/1/2020 2000-2021 [employer]. All rights reserved. This information is not  intended as a substitute for professional medical care. Always follow your healthcare professional's instructions.        Your Health Risk Assessment indicates you feel you are not in good emotional health.    Recreation   Recreation is not limited to sports and team events. It includes any activity that provides relaxation, interest, enjoyment, and exercise. Recreation provides an outlet for physical, mental, and social energy. It can give a sense of worth and achievement. It can help you stay healthy.    Mental Exercise and Social Involvement  Mental and emotional health is as important as physical health. Keep in touch with friends and family. Stay as active as possible. Continue to learn and challenge yourself.   Things you can do to stay mentally active are:    Learn something new, like a foreign language or musical instrument.     Play SCRABBLE or do crossword puzzles. If you cannot find people to play these games with you at home, you can play them with others on your computer through the Internet.     Join a games club--anything from card games to chess or checkers or lawn bowling.     Start a new hobby.     Go back to school.     Volunteer.     Read.   Keep up with world events.

## 2022-12-07 ENCOUNTER — LAB (OUTPATIENT)
Dept: LAB | Facility: OTHER | Age: 67
End: 2022-12-07
Payer: COMMERCIAL

## 2022-12-07 ENCOUNTER — TELEPHONE (OUTPATIENT)
Dept: GASTROENTEROLOGY | Facility: CLINIC | Age: 67
End: 2022-12-07

## 2022-12-07 DIAGNOSIS — I10 BENIGN ESSENTIAL HYPERTENSION: ICD-10-CM

## 2022-12-07 DIAGNOSIS — R73.9 HYPERGLYCEMIA: ICD-10-CM

## 2022-12-07 DIAGNOSIS — R00.2 PALPITATIONS: ICD-10-CM

## 2022-12-07 DIAGNOSIS — Z12.5 SCREENING FOR PROSTATE CANCER: ICD-10-CM

## 2022-12-07 PROBLEM — R73.03 PREDIABETES: Status: ACTIVE | Noted: 2022-12-07

## 2022-12-07 LAB
ALBUMIN SERPL-MCNC: 3.8 G/DL (ref 3.4–5)
ALP SERPL-CCNC: 62 U/L (ref 40–150)
ALT SERPL W P-5'-P-CCNC: 27 U/L (ref 0–70)
ANION GAP SERPL CALCULATED.3IONS-SCNC: 4 MMOL/L (ref 3–14)
AST SERPL W P-5'-P-CCNC: 17 U/L (ref 0–45)
BILIRUB SERPL-MCNC: 0.5 MG/DL (ref 0.2–1.3)
BUN SERPL-MCNC: 13 MG/DL (ref 7–30)
CALCIUM SERPL-MCNC: 9.1 MG/DL (ref 8.5–10.1)
CHLORIDE BLD-SCNC: 103 MMOL/L (ref 94–109)
CHOLEST SERPL-MCNC: 260 MG/DL
CO2 SERPL-SCNC: 29 MMOL/L (ref 20–32)
CREAT SERPL-MCNC: 1 MG/DL (ref 0.66–1.25)
ERYTHROCYTE [DISTWIDTH] IN BLOOD BY AUTOMATED COUNT: 14.6 % (ref 10–15)
FASTING STATUS PATIENT QL REPORTED: YES
GFR SERPL CREATININE-BSD FRML MDRD: 82 ML/MIN/1.73M2
GLUCOSE BLD-MCNC: 108 MG/DL (ref 70–99)
HBA1C MFR BLD: 6 % (ref 0–5.6)
HCT VFR BLD AUTO: 42.2 % (ref 40–53)
HDLC SERPL-MCNC: 32 MG/DL
HGB BLD-MCNC: 13.7 G/DL (ref 13.3–17.7)
LDLC SERPL CALC-MCNC: 162 MG/DL
MCH RBC QN AUTO: 27.5 PG (ref 26.5–33)
MCHC RBC AUTO-ENTMCNC: 32.5 G/DL (ref 31.5–36.5)
MCV RBC AUTO: 85 FL (ref 78–100)
NONHDLC SERPL-MCNC: 228 MG/DL
PLATELET # BLD AUTO: 339 10E3/UL (ref 150–450)
POTASSIUM BLD-SCNC: 4.6 MMOL/L (ref 3.4–5.3)
PROT SERPL-MCNC: 8.2 G/DL (ref 6.8–8.8)
PSA SERPL-MCNC: 1.3 UG/L (ref 0–4)
RBC # BLD AUTO: 4.98 10E6/UL (ref 4.4–5.9)
SODIUM SERPL-SCNC: 136 MMOL/L (ref 133–144)
TRIGL SERPL-MCNC: 328 MG/DL
TSH SERPL DL<=0.005 MIU/L-ACNC: 1.39 MU/L (ref 0.4–4)
WBC # BLD AUTO: 8.4 10E3/UL (ref 4–11)

## 2022-12-07 PROCEDURE — 36415 COLL VENOUS BLD VENIPUNCTURE: CPT

## 2022-12-07 PROCEDURE — 85027 COMPLETE CBC AUTOMATED: CPT

## 2022-12-07 PROCEDURE — 80053 COMPREHEN METABOLIC PANEL: CPT

## 2022-12-07 PROCEDURE — 80061 LIPID PANEL: CPT

## 2022-12-07 PROCEDURE — 83036 HEMOGLOBIN GLYCOSYLATED A1C: CPT

## 2022-12-07 PROCEDURE — G0103 PSA SCREENING: HCPCS

## 2022-12-07 PROCEDURE — 84443 ASSAY THYROID STIM HORMONE: CPT

## 2022-12-07 NOTE — TELEPHONE ENCOUNTER
Screening Questions  BLUE  KIND OF PREP RED  LOCATION [review exclusion criteria] GREEN  SEDATION TYPE        Y Are you active on mychart?     Huong Tomas MD    Ordering/Referring Provider?        [employer] What type of coverage do you have?      N Have you had a positive covid test in the last 14 days?     32.1 1. BMI  [BMI 40+ - review exclusion criteria]    Y  2. Are you able to give consent for your medical care? [IF NO,RN REVIEW]        N  3. Are you taking any prescription pain medications on a routine schedule?      NA  3a. EXTENDED PREP What kind of prescription?     N 4. Do you have any chemical dependencies such as alcohol, street drugs, or methadone?    N 5. Do you have any history of post-traumatic stress syndrome, severe anxiety or history of psychosis?      **If yes 3- 5 , please schedule with MAC sedation.**          IF YES TO ANY 6 - 10 - HOSPITAL SETTING ONLY.     N 6.   Do you need assistance transferring?     N 7.   Have you had a heart or lung transplant?    N 8.   Are you currently on dialysis?   N 9.   Do you use daily home oxygen?   N 10. Do you take nitroglycerin?   10a. NA If yes, how often?     11. [FEMALES]  NA Are you currently pregnant?    11a. NA If yes, how many weeks? [ Greater than 12 weeks, OR NEEDED]    N 12. Do you have Pulmonary Hypertension? *NEED PAC APPT AT [facility]*     N 13. [review exclusion criteria]  Do you have any implantable devices in your body (pacemaker, defib, LVAD)?    N 14. In the past 6 months, have you had any heart related issues including cardiomyopathy or heart attack?     14a. NA If yes, did it require cardiac stenting if so when?     N 15. Have you had a stroke or Transient ischemic attack (TIA - aka  mini stroke ) within 6 months?      N 16. Do you have mod to severe Obstructive Sleep Apnea?  [Hospital only - Ok at [place]]    N 17. Do you have SEVERE AND UNCONTROLLED asthma? *NEED PAC APPT AT [facility]*     N 18. Are you currently taking any blood thinners?  "    18a. If yes, inform patient to \"follow up w/ ordering provider for bridging instructions.\"    N 19. Do you take the medication Phentermine?    19a. If yes, \"Hold for 7 days before procedure.  Please consult your prescribing provider if you have questions about holding this medication.\"     N  20. Do you have chronic kidney disease?      N  21. Do you have a diagnosis of diabetes?     N  22. On a regular basis do you go 3-5 days between bowel movements?      23. Preferred LOCAL Pharmacy for Pre Prescription    [ LIST ONLY ONE PHARMACY]        [id number] [id number] AT [facility] OF  & MAIN      - CLOSING REMINDERS -    Informed patient they will need an adult    Cannot take any type of public or medical transportation alone    Conscious Sedation- Needs  for 6 hours after the procedure       MAC/General-Needs  for 24 hours after procedure    Pre-Procedure Covid test to be completed [[facility] PCR Testing Required]    Confirmed Nurse will call to complete assessment       - SCHEDULING DETAILS -     LONG  Surgeon    03/10/2  Date of Procedure  Upper Endoscopy [EGD]  Type of Procedure Scheduled  [facility]     MODERATE Sedation Type     N PAC / Pre-op Required         Additional comments:            "

## 2022-12-08 NOTE — RESULT ENCOUNTER NOTE
Mr. Dugan    Your recent test results are attached. Elevated but improved total cholesterol and triglyceride levels. Hba1c is elevated showing signs of prediabetes. I would recommend aggressive dietary modifications to reduce carb intake and regular exercise to help improve sugars and prevent diabetes . We could try a small dose of metformin to help with blood sugars. I can send the script over if you agree to this. Otherwise , we will recheck this in 6 months again    If you have any questions or concerns please contact me via My Chart or call the clinic at [phone]     Thank You  Huong Tomas MD.

## 2023-01-04 ENCOUNTER — TELEPHONE (OUTPATIENT)
Dept: GASTROENTEROLOGY | Facility: CLINIC | Age: 68
End: 2023-01-04

## 2023-01-04 NOTE — TELEPHONE ENCOUNTER
Caller: Elena  Reason for Reschedule/Cancellation (please be detailed, any staff messages or encounters to note?): Provider not available      Prior to reschedule please review:    Ordering Provider:Huong Tomas    Sedation per order:moderate    Does patient have any ASC Exclusions, please identify?:       Notes on Cancelled Procedure:    Procedure:Upper Endoscopy [EGD]     Date: 3/10/23    Location:[facility] [place]; [address] [place] [address]    Surgeon: Long        Rescheduled: Yes    Procedure: Upper Endoscopy [EGD]     Date: 3/14/23    Location:[facility] [place]; [address] [place] [address]    Surgeon: Long    Sedation Level Scheduled  mac,  Reason for Sedation Level on block for that day    Prep/Instructions updated and sent: sonido

## 2023-03-13 NOTE — H&P
[facility] Anesthesia Pre-op History and Physical    Krishna Dugan MRN# [id number]   Age: 67 year old YOB: 1955      Date of Surgery: 3/14/2023 Location [facility]      Date of Exam 3/14/2023 Facility (Same day)       Home clinic: [facility]  Primary care provider: Huong Tomas         Chief Complaint and/or Reason for Procedure:   No chief complaint on file.  EGD  GERD.       Active problem list:     Patient Active Problem List    Diagnosis Date Noted     Prediabetes 12/07/2022     Priority: Medium     S/P rotator cuff repair 06/01/2021     Priority: Medium     Essential tremor 03/15/2021     Priority: Medium     Chronic right-sided low back pain without sciatica 09/02/2020     Priority: Medium     Sexual dysfunction 09/02/2020     Priority: Medium     Other testicular hypofunction 03/15/2019     Priority: Medium     Vitamin D deficiency 03/15/2019     Priority: Medium     Allergic rhinitis due to animal dander 03/06/2018     Priority: Medium     Allergic rhinitis due to dust mite 03/06/2018     Priority: Medium     Essential hypertension, benign 02/05/2018     Priority: Medium     Hyperlipidemia 02/05/2018     Priority: Medium     Overview:   HYPERLIPIDEMIA NEC/NOS       S/P colonoscopic polypectomy 02/05/2018     Priority: Medium     Gastroesophageal reflux disease without esophagitis 12/09/2015     Priority: Medium            Medications (include herbals and vitamins):   Any Plavix use in the last 7 days? No     No current facility-administered medications for this encounter.     Current Outpatient Medications   Medication Sig     azelastine (ASTELIN) 0.1 % nasal spray Spray 1-2 sprays into both nostrils 2 times daily     fenofibrate (TRIGLIDE/LOFIBRA) 160 MG tablet Take 1 tablet (160 mg) by mouth daily     fluticasone (FLONASE) 50 MCG/ACT spray Spray 2 sprays into both nostrils daily     lisinopril (ZESTRIL) 30 MG tablet Take 1  tablet (30 mg) by mouth daily     loratadine (CLARITIN) 10 MG tablet Take 1 tablet (10 mg) by mouth daily     omeprazole (PRILOSEC) 40 MG DR capsule TAKE 1 CAPSULE(40 MG) BY MOUTH DAILY WITH DINNER Strength: 40 mg     propranolol ER (INDERAL LA) 60 MG 24 hr capsule Take 1 capsule (60 mg) by mouth daily     tadalafil (CIALIS) 10 MG tablet Take 1 tablet (10 mg) by mouth daily as needed (erectile dysfunction)     VITAMIN D, CHOLECALCIFEROL, PO Take 5,000 Units by mouth daily             Allergies:      Allergies   Allergen Reactions     Statins [Hmg-Coa-R Inhibitors] Muscle Pain (Myalgia)     Valdecoxib      Ears turn purple  Facial flushing  Bextra     Allergy to Latex? No  Allergy to tape?   No  Intolerances:             Physical Exam:   All vitals have been reviewed  No data found.  No intake/output data recorded.  Lungs:   No increased work of breathing, good air exchange, clear to auscultation bilaterally, no crackles or wheezing     Cardiovascular:   Normal apical impulse, regular rate and rhythm, normal S1 and S2, no S3 or S4, and no murmur noted             Lab / Radiology Results:            Anesthetic risk and/or ASA classification:       Sanchez Franco MD

## 2023-03-14 ENCOUNTER — ANESTHESIA (OUTPATIENT)
Dept: GASTROENTEROLOGY | Facility: CLINIC | Age: 68
End: 2023-03-14
Payer: COMMERCIAL

## 2023-03-14 ENCOUNTER — HOSPITAL ENCOUNTER (OUTPATIENT)
Facility: CLINIC | Age: 68
Discharge: HOME | End: 2023-03-14
Attending: INTERNAL MEDICINE | Admitting: INTERNAL MEDICINE
Payer: COMMERCIAL

## 2023-03-14 ENCOUNTER — ANESTHESIA EVENT (OUTPATIENT)
Dept: GASTROENTEROLOGY | Facility: CLINIC | Age: 68
End: 2023-03-14
Payer: COMMERCIAL

## 2023-03-14 VITALS
SYSTOLIC BLOOD PRESSURE: 119 MMHG | DIASTOLIC BLOOD PRESSURE: 66 MMHG | TEMPERATURE: 97.9 F | HEART RATE: 78 BPM | OXYGEN SATURATION: 96 % | RESPIRATION RATE: 14 BRPM

## 2023-03-14 LAB — UPPER GI ENDOSCOPY: NORMAL

## 2023-03-14 PROCEDURE — 370N000017 HC ANESTHESIA TECHNICAL FEE, PER MIN: Performed by: INTERNAL MEDICINE

## 2023-03-14 PROCEDURE — 250N000011 HC RX IP 250 OP 636: Performed by: NURSE ANESTHETIST, CERTIFIED REGISTERED

## 2023-03-14 PROCEDURE — 258N000003 HC RX IP 258 OP 636: Performed by: NURSE ANESTHETIST, CERTIFIED REGISTERED

## 2023-03-14 PROCEDURE — 250N000009 HC RX 250: Performed by: NURSE ANESTHETIST, CERTIFIED REGISTERED

## 2023-03-14 PROCEDURE — 43235 EGD DIAGNOSTIC BRUSH WASH: CPT | Performed by: INTERNAL MEDICINE

## 2023-03-14 RX ORDER — SODIUM CHLORIDE, SODIUM LACTATE, POTASSIUM CHLORIDE, CALCIUM CHLORIDE 600; 310; 30; 20 MG/100ML; MG/100ML; MG/100ML; MG/100ML
INJECTION, SOLUTION INTRAVENOUS CONTINUOUS
Status: DISCONTINUED | OUTPATIENT
Start: 2023-03-14 | End: 2023-03-14 | Stop reason: HOSPADM

## 2023-03-14 RX ORDER — PROPOFOL 10 MG/ML
INJECTION, EMULSION INTRAVENOUS PRN
Status: DISCONTINUED | OUTPATIENT
Start: 2023-03-14 | End: 2023-03-14

## 2023-03-14 RX ORDER — LIDOCAINE HYDROCHLORIDE 20 MG/ML
INJECTION, SOLUTION INFILTRATION; PERINEURAL PRN
Status: DISCONTINUED | OUTPATIENT
Start: 2023-03-14 | End: 2023-03-14

## 2023-03-14 RX ORDER — LIDOCAINE 40 MG/G
CREAM TOPICAL
Status: DISCONTINUED | OUTPATIENT
Start: 2023-03-14 | End: 2023-03-14 | Stop reason: HOSPADM

## 2023-03-14 RX ORDER — PROPOFOL 10 MG/ML
INJECTION, EMULSION INTRAVENOUS CONTINUOUS PRN
Status: DISCONTINUED | OUTPATIENT
Start: 2023-03-14 | End: 2023-03-14

## 2023-03-14 RX ADMIN — PROPOFOL 60 MG: 10 INJECTION, EMULSION INTRAVENOUS at 07:29

## 2023-03-14 RX ADMIN — PROPOFOL 150 MCG/KG/MIN: 10 INJECTION, EMULSION INTRAVENOUS at 07:29

## 2023-03-14 RX ADMIN — SODIUM CHLORIDE, POTASSIUM CHLORIDE, SODIUM LACTATE AND CALCIUM CHLORIDE: 600; 310; 30; 20 INJECTION, SOLUTION INTRAVENOUS at 07:00

## 2023-03-14 RX ADMIN — LIDOCAINE HYDROCHLORIDE 80 MG: 20 INJECTION, SOLUTION INFILTRATION; PERINEURAL at 07:29

## 2023-03-14 ASSESSMENT — ACTIVITIES OF DAILY LIVING (ADL): ADLS_ACUITY_SCORE: 35

## 2023-03-14 ASSESSMENT — LIFESTYLE VARIABLES: TOBACCO_USE: 1

## 2023-03-14 NOTE — ANESTHESIA POSTPROCEDURE EVALUATION
Patient: Krishna Dugan    Procedure: Procedure(s):  ESOPHAGOGASTRODUODENOSCOPY (EGD)       Anesthesia Type:  MAC    Note:  Disposition: Outpatient   Postop Pain Control: Uneventful            Sign Out: Well controlled pain   PONV: No   Neuro/Psych: Uneventful            Sign Out: Acceptable/Baseline neuro status   Airway/Respiratory: Uneventful            Sign Out: Acceptable/Baseline resp. status   CV/Hemodynamics: Uneventful            Sign Out: Acceptable CV status   Other NRE: NONE   DID A NON-ROUTINE EVENT OCCUR? No    Event details/Postop Comments:  Pt was happy with anesthesia care.  No complications.  I will follow up with the pt if needed.           Last vitals:  Vitals Value Taken Time   /66 03/14/23 0810   Temp 97.9  F (36.6  C) 03/14/23 0740   Pulse 78 03/14/23 0810   Resp 14 03/14/23 0741   SpO2 96 % 03/14/23 0810   Vitals shown include unvalidated device data.    Electronically Signed By: LAM Blakely CRNA  March 14, 2023  8:19 AM

## 2023-03-14 NOTE — ANESTHESIA CARE TRANSFER NOTE
Patient: Krishna Dugan    Procedure: Procedure(s):  ESOPHAGOGASTRODUODENOSCOPY (EGD)       Diagnosis: Gastroesophageal reflux disease with esophagitis without hemorrhage [K21.00]  Diagnosis Additional Information: No value filed.    Anesthesia Type:   MAC     Note:    Oropharynx: oropharynx clear of all foreign objects and spontaneously breathing  Level of Consciousness: drowsy  Oxygen Supplementation: face mask    Independent Airway: airway patency satisfactory and stable  Dentition: dentition unchanged  Vital Signs Stable: post-procedure vital signs reviewed and stable  Report to RN Given: handoff report given  Patient transferred to: Phase II    Handoff Report: Identifed the Patient, Identified the Reponsible Provider, Reviewed the pertinent medical history, Discussed the surgical course, Reviewed Intra-OP anesthesia mangement and issues during anesthesia, Set expectations for post-procedure period and Allowed opportunity for questions and acknowledgement of understanding      Vitals:  Vitals Value Taken Time   BP     Temp     Pulse     Resp     SpO2         Electronically Signed By: LAM Blakely CRNA  March 14, 2023  7:36 AM

## 2023-03-14 NOTE — ANESTHESIA PREPROCEDURE EVALUATION
Anesthesia Pre-Procedure Evaluation    Patient: Krishna Dugan   MRN: [id number] : 1955        Procedure : Procedure(s):  ESOPHAGOGASTRODUODENOSCOPY (EGD)          Past Medical History:   Diagnosis Date     Diagnostic skin and sensitization tests  skin tests pos. for cat/dog ONLY (all other environmental allergens NEGATIVE)      No past surgical history on file.   Allergies   Allergen Reactions     Statins [Hmg-Coa-R Inhibitors] Muscle Pain (Myalgia)     Valdecoxib      Ears turn purple  Facial flushing  Bextra      Social History     Tobacco Use     Smoking status: Former     Packs/day: 0.50     Years: 10.00     Pack years: 5.00     Types: Cigarettes     Smokeless tobacco: Never   Substance Use Topics     Alcohol use: No     Comment: very little      Wt Readings from Last 1 Encounters:   22 95.9 kg (211 lb 8 oz)        Anesthesia Evaluation   Pt has had prior anesthetic. Type: General.        ROS/MED HX  ENT/Pulmonary:     (+) tobacco use, Past use,     Neurologic: Comment: Essential tremor       Cardiovascular:     (+) Dyslipidemia hypertension-----Irregular Heartbeat/Palpitations, Previous cardiac testing   Echo: Date: Results:    Stress Test: Date: Results:    ECG Reviewed: Date: 2021 Results:  SR  Cath: Date: Results:      METS/Exercise Tolerance:     Hematologic:  - neg hematologic  ROS     Musculoskeletal: Comment: Rotator cuff repair      GI/Hepatic:     (+) GERD, esophageal disease,     Renal/Genitourinary:  - neg Renal ROS     Endo:  - neg endo ROS     Psychiatric/Substance Use:  - neg psychiatric ROS     Infectious Disease:  - neg infectious disease ROS     Malignancy:  - neg malignancy ROS     Other:            Physical Exam    Airway        Mallampati: II   TM distance: > 3 FB   Neck ROM: full   Mouth opening: > 3 cm    Respiratory Devices and Support         Dental           Cardiovascular   cardiovascular exam normal       Rhythm and rate: regular and normal     Pulmonary    pulmonary exam normal        breath sounds clear to auscultation           OUTSIDE LABS:  CBC:   Lab Results   Component Value Date    WBC 8.4 12/07/2022    WBC 9.0 03/15/2021    HGB 13.7 12/07/2022    HGB 14.3 04/23/2021    HCT 42.2 12/07/2022    HCT 44.0 03/15/2021     12/07/2022     03/15/2021     BMP:   Lab Results   Component Value Date     12/07/2022     04/23/2021    POTASSIUM 4.6 12/07/2022    POTASSIUM 4.6 04/23/2021    CHLORIDE 103 12/07/2022    CHLORIDE 104 04/23/2021    CO2 29 12/07/2022    CO2 30 04/23/2021    BUN 13 12/07/2022    BUN 15 04/23/2021    CR 1.00 12/07/2022    CR 1.16 04/23/2021     (H) 12/07/2022     (H) 04/23/2021     COAGS: No results found for: PTT, INR, FIBR  POC: No results found for: BGM, HCG, HCGS  HEPATIC:   Lab Results   Component Value Date    ALBUMIN 3.8 12/07/2022    PROTTOTAL 8.2 12/07/2022    ALT 27 12/07/2022    AST 17 12/07/2022    ALKPHOS 62 12/07/2022    BILITOTAL 0.5 12/07/2022    KUMAR 13 09/27/2005     OTHER:   Lab Results   Component Value Date    LACT 1.0 09/27/2005    A1C 6.0 (H) 12/07/2022    LILLIAN 9.1 12/07/2022    TSH 1.39 12/07/2022       Anesthesia Plan    ASA Status:  2   NPO Status:  NPO Appropriate    Anesthesia Type: MAC.     - Reason for MAC: straight local not clinically adequate   Induction: Intravenous, Propofol.   Maintenance: TIVA.        Consents    Anesthesia Plan(s) and associated risks, benefits, and realistic alternatives discussed. Questions answered and patient/representative(s) expressed understanding.     - Discussed: Risks, Benefits and Alternatives for BOTH SEDATION and the PROCEDURE were discussed     - Discussed with:  Patient    Use of blood products discussed: No .     Postoperative Care            Comments:    Other Comments: The risks and benefits of anesthesia, and the alternatives where applicable, have been discussed with the patient, and they wish to proceed.            Homar Berry, APRN  CRNA

## 2023-03-14 NOTE — DISCHARGE INSTRUCTIONS
[facility]    Home Care Following Endoscopy          Activity:  You have just undergone an endoscopic procedure usually performed with conscious sedation.  Do not work or operate machinery (including a car) for at least 12 hours.    I encourage you to walk and attempt to pass this air as soon as possible.    Diet:  Return to the diet you were on before your procedure but eat lightly for the first 12-24 hours.  Drink plenty of water.  Resume any regular medications unless otherwise advised by your physician.  Please begin any new medication prescribed as a result of your procedure as directed by your physician.   If you had any biopsy or polyp removed please refrain from aspirin or aspirin products for 2 days.  If on Coumadin please restart as instructed by your physician.   Pain:  You may take Tylenol as needed for pain.  Expected Recovery:  You can expect some mild abdominal fullness and/or discomfort due to the air used to inflate your intestinal tract. It is also normal to have a mild sore throat after upper endoscopy.    Call Your Physician if You Have:  After Upper Endoscopy:  Shoulder, back or chest pain.  Difficulty breathing or swallowing.  Vomiting blood.  After Colonoscopy:  Worsening persisting abdominal pain which is worse with activity.  Fevers (>101 degrees F), chills or shakes.  Passage of continued blood with bowel movements.   Any questions or concerns about your recovery, please call [phone] or after hours [phone] Nurse Advice Line.    Follow-up Care:  IF You did have polyps/biopsy tissue sample(s) removed.  The polyps/biopsy tissue sample(s) will be sent to pathology.  You should receive letter in your My Chart from Dr Franco with your results within 1-2 weeks. If you do not participate in My Chart a physical letter will come in the mail in 2-3 weeks.  Please call if you have not received a notification of your results.

## 2023-07-17 ENCOUNTER — OFFICE VISIT (OUTPATIENT)
Dept: INTERNAL MEDICINE | Facility: CLINIC | Age: 68
End: 2023-07-17
Payer: COMMERCIAL

## 2023-07-17 VITALS
DIASTOLIC BLOOD PRESSURE: 72 MMHG | OXYGEN SATURATION: 100 % | BODY MASS INDEX: 32.28 KG/M2 | TEMPERATURE: 98.3 F | RESPIRATION RATE: 16 BRPM | HEIGHT: 68 IN | WEIGHT: 213 LBS | HEART RATE: 92 BPM | SYSTOLIC BLOOD PRESSURE: 136 MMHG

## 2023-07-17 DIAGNOSIS — I10 ESSENTIAL HYPERTENSION, BENIGN: ICD-10-CM

## 2023-07-17 DIAGNOSIS — M54.6 ACUTE RIGHT-SIDED THORACIC BACK PAIN: Primary | ICD-10-CM

## 2023-07-17 LAB
ANION GAP SERPL CALCULATED.3IONS-SCNC: 10 MMOL/L (ref 7–15)
BUN SERPL-MCNC: 13 MG/DL (ref 8–23)
CALCIUM SERPL-MCNC: 9.3 MG/DL (ref 8.8–10.2)
CHLORIDE SERPL-SCNC: 101 MMOL/L (ref 98–107)
CREAT SERPL-MCNC: 1.06 MG/DL (ref 0.67–1.17)
DEPRECATED HCO3 PLAS-SCNC: 25 MMOL/L (ref 22–29)
ERYTHROCYTE [DISTWIDTH] IN BLOOD BY AUTOMATED COUNT: 15.9 % (ref 10–15)
GFR SERPL CREATININE-BSD FRML MDRD: 76 ML/MIN/1.73M2
GLUCOSE SERPL-MCNC: 118 MG/DL (ref 70–99)
HCT VFR BLD AUTO: 42.2 % (ref 40–53)
HGB BLD-MCNC: 13.7 G/DL (ref 13.3–17.7)
MCH RBC QN AUTO: 26.8 PG (ref 26.5–33)
MCHC RBC AUTO-ENTMCNC: 32.5 G/DL (ref 31.5–36.5)
MCV RBC AUTO: 83 FL (ref 78–100)
PLATELET # BLD AUTO: 336 10E3/UL (ref 150–450)
POTASSIUM SERPL-SCNC: 4.4 MMOL/L (ref 3.4–5.3)
RBC # BLD AUTO: 5.11 10E6/UL (ref 4.4–5.9)
SODIUM SERPL-SCNC: 136 MMOL/L (ref 136–145)
WBC # BLD AUTO: 10 10E3/UL (ref 4–11)

## 2023-07-17 PROCEDURE — 85027 COMPLETE CBC AUTOMATED: CPT | Performed by: INTERNAL MEDICINE

## 2023-07-17 PROCEDURE — 99214 OFFICE O/P EST MOD 30 MIN: CPT | Performed by: INTERNAL MEDICINE

## 2023-07-17 PROCEDURE — 36415 COLL VENOUS BLD VENIPUNCTURE: CPT | Performed by: INTERNAL MEDICINE

## 2023-07-17 PROCEDURE — 80048 BASIC METABOLIC PNL TOTAL CA: CPT | Performed by: INTERNAL MEDICINE

## 2023-07-17 ASSESSMENT — ENCOUNTER SYMPTOMS: BACK PAIN: 1

## 2023-07-17 ASSESSMENT — PAIN SCALES - GENERAL: PAINLEVEL: MODERATE PAIN (4)

## 2023-07-17 NOTE — PROGRESS NOTES
"  Assessment & Plan     Essential hypertension, benign  Blood pressure is treated with lisinopril, better on recheck.  Continue his lisinopril he is not on the propranolol seems to be doing okay.    Acute right-sided thoracic back pain  Acute right-sided thoracic back pain for last few weeks, no shortness of breath no cough quite severe pain when he takes a deep breath I worry about pleurisy I think he should have a chest CT to evaluate.  His PSA was normal in January but still could have metastatic disease could have empyema or just pleurisy.  We will do a chest CT with and without to rule out any blood clots and other lung lesion.  Then possibly treat with prednisone.  - Basic metabolic panel  (Ca, Cl, CO2, Creat, Gluc, K, Na, BUN); Future  - CBC with platelets; Future  - CT Chest w/o & w Contrast; Future             BMI:   Estimated body mass index is 32.39 kg/m  as calculated from the following:    Height as of this encounter: 1.727 m (5' 8\").    Weight as of this encounter: 96.6 kg (213 lb).           Sekou Enrique MD  [facility] JOLYNN Kennedy is a 68 year old, presenting for the following health issues:  Back Pain      7/17/2023     3:26 PM   Additional Questions   Roomed by Lupe Beauchamp     Back Pain     History of Present Illness       Reason for visit:  Back pain    He eats 0-1 servings of fruits and vegetables daily.He consumes 1 sweetened beverage(s) daily.He exercises with enough effort to increase his heart rate 9 or less minutes per day.  He exercises with enough effort to increase his heart rate 3 or less days per week.   He is taking medications regularly.        retired , lives in [facility] [place].      Not taking propranolol concerned about slower heart rate.      Right thoracic back hurts to breath,   5 weeks ago picked up old dog and carries up and down stairs, then felt lower back go out.  Worked out in a week and half, then got this thoracic " "back pain.  Now that isn't going away.     Just sits in that one spot, no radicular symptoms.      Worse at night and in the morning, sometimes more with activity.      Review of Systems   Musculoskeletal:  Positive for back pain.          Objective    BP (!) 154/72   Pulse 92   Temp 98.3  F (36.8  C) (Temporal)   Resp 16   Ht 1.727 m (5' 8\")   Wt 96.6 kg (213 lb)   SpO2 100%   BMI 32.39 kg/m    Body mass index is 32.39 kg/m .  Physical Exam   NAD  Lungs are clear, heart is regular  Spine is nontender  Ribs are nontender to palpation  With deep breaths the right lower rib area does have exquisite pain.                      "

## 2023-07-18 ENCOUNTER — HOSPITAL ENCOUNTER (OUTPATIENT)
Dept: CT IMAGING | Facility: CLINIC | Age: 68
Discharge: HOME | End: 2023-07-18
Attending: INTERNAL MEDICINE | Admitting: INTERNAL MEDICINE
Payer: COMMERCIAL

## 2023-07-18 DIAGNOSIS — M54.6 ACUTE RIGHT-SIDED THORACIC BACK PAIN: ICD-10-CM

## 2023-07-18 PROCEDURE — 250N000011 HC RX IP 250 OP 636: Performed by: INTERNAL MEDICINE

## 2023-07-18 PROCEDURE — 250N000009 HC RX 250: Performed by: INTERNAL MEDICINE

## 2023-07-18 PROCEDURE — 71275 CT ANGIOGRAPHY CHEST: CPT

## 2023-07-18 RX ORDER — IOPAMIDOL 755 MG/ML
500 INJECTION, SOLUTION INTRAVASCULAR ONCE
Status: COMPLETED | OUTPATIENT
Start: 2023-07-18 | End: 2023-07-18

## 2023-07-18 RX ORDER — PREDNISONE 20 MG/1
TABLET ORAL
Qty: 20 TABLET | Refills: 0 | Status: SHIPPED | OUTPATIENT
Start: 2023-07-18 | End: 2023-08-08

## 2023-07-18 RX ADMIN — IOPAMIDOL 80 ML: 755 INJECTION, SOLUTION INTRAVENOUS at 08:11

## 2023-07-18 RX ADMIN — SODIUM CHLORIDE 70 ML: 9 INJECTION, SOLUTION INTRAVENOUS at 08:10

## 2023-08-08 ENCOUNTER — ANCILLARY PROCEDURE (OUTPATIENT)
Dept: GENERAL RADIOLOGY | Facility: CLINIC | Age: 68
End: 2023-08-08
Attending: PHYSICIAN ASSISTANT
Payer: COMMERCIAL

## 2023-08-08 ENCOUNTER — OFFICE VISIT (OUTPATIENT)
Dept: FAMILY MEDICINE | Facility: CLINIC | Age: 68
End: 2023-08-08
Payer: COMMERCIAL

## 2023-08-08 VITALS
SYSTOLIC BLOOD PRESSURE: 131 MMHG | TEMPERATURE: 98.2 F | DIASTOLIC BLOOD PRESSURE: 83 MMHG | HEART RATE: 91 BPM | BODY MASS INDEX: 32.28 KG/M2 | OXYGEN SATURATION: 98 % | HEIGHT: 68 IN | RESPIRATION RATE: 18 BRPM | WEIGHT: 213 LBS

## 2023-08-08 DIAGNOSIS — M54.6 RIGHT-SIDED THORACIC BACK PAIN, UNSPECIFIED CHRONICITY: ICD-10-CM

## 2023-08-08 DIAGNOSIS — M54.6 RIGHT-SIDED THORACIC BACK PAIN, UNSPECIFIED CHRONICITY: Primary | ICD-10-CM

## 2023-08-08 PROCEDURE — 72070 X-RAY EXAM THORAC SPINE 2VWS: CPT | Mod: TC | Performed by: RADIOLOGY

## 2023-08-08 PROCEDURE — 99214 OFFICE O/P EST MOD 30 MIN: CPT | Performed by: PHYSICIAN ASSISTANT

## 2023-08-08 RX ORDER — METHOCARBAMOL 500 MG/1
500 TABLET, FILM COATED ORAL 4 TIMES DAILY PRN
Qty: 20 TABLET | Refills: 1 | Status: SHIPPED | OUTPATIENT
Start: 2023-08-08 | End: 2024-08-08

## 2023-08-08 ASSESSMENT — ENCOUNTER SYMPTOMS: BACK PAIN: 1

## 2023-08-08 NOTE — PROGRESS NOTES
{PROVIDER CHARTING PREFERENCE:306027}    Anibal Kennedy is a 68 year old, presenting for the following health issues:  RECHECK      8/8/2023     3:53 PM   Additional Questions   Roomed by Chelo   Accompanied by Self       History of Present Illness       Back Pain:  He presents for follow up of back pain. Patient's back pain is a chronic problem.  Location of back pain:  Right middle of back  Description of back pain: gnawing  Back pain spreads: nowhere    Since patient first noticed back pain, pain is: always present, but gets better and worse  Does back pain interfere with his job:  Not applicable       He eats 0-1 servings of fruits and vegetables daily.He consumes 1 sweetened beverage(s) daily.He exercises with enough effort to increase his heart rate 9 or less minutes per day.  He exercises with enough effort to increase his heart rate 3 or less days per week.   He is taking medications regularly.       {SUPERLIST (Optional):187977}  {additonal problems for provider to add (Optional):566728}      Review of Systems   {ROS COMP (Optional):951585}      Objective    There were no vitals taken for this visit.  There is no height or weight on file to calculate BMI.  Physical Exam   {Exam List (Optional):420922}    {Diagnostic Test Results (Optional):330382}    {AMBULATORY ATTESTATION (Optional):839153}

## 2023-08-08 NOTE — PROGRESS NOTES
"  Assessment & Plan     Right-sided thoracic back pain, unspecified chronicity  Still suspect msk.  Start with xray and muscle relaxant.  If xray negative consider PT or continued symptomatic cares.    - XR Thoracic Spine 2 Views; Future  - methocarbamol (ROBAXIN) 500 MG tablet; Take 1 tablet (500 mg) by mouth 4 times daily as needed for muscle spasms             BMI:   Estimated body mass index is 32.39 kg/m  as calculated from the following:    Height as of this encounter: 1.727 m (5' 8\").    Weight as of this encounter: 96.6 kg (213 lb).   Weight management plan: did not address         Fay Cuellar PA-C  [facility] MAHENDRA Kennedy is a 68 year old, presenting for the following health issues:  RECHECK and Back Pain      8/8/2023     3:53 PM   Additional Questions   Roomed by Chelo   Accompanied by Self       History of Present Illness       Back Pain:  He presents for follow up of back pain. Patient's back pain is a chronic problem.  Location of back pain:  Right middle of back  Description of back pain: gnawing  Back pain spreads: nowhere    Since patient first noticed back pain, pain is: always present, but gets better and worse  Does back pain interfere with his job:  Not applicable       He eats 0-1 servings of fruits and vegetables daily.He consumes 1 sweetened beverage(s) daily.He exercises with enough effort to increase his heart rate 9 or less minutes per day.  He exercises with enough effort to increase his heart rate 3 or less days per week.   He is taking medications regularly.     Started about 3 months ago.  Lifted his dog and pulled his low back and this has happened before.  Then the right side mid back and radiates around a little.  Started about a week after injuring low back.  Was seen for this and ct was done.  Prednisone helped a little but didn't completely resolve pain.  Gets a gnawing pain at night.  Hard to sleep.  Has not had this back pain before.  " "Takes ibuprofen and tylenol and it helps  No fever, dysuria or blood in urine.  No change in bowel habits.              Review of Systems   Musculoskeletal:  Positive for back pain.            Objective    /83   Pulse 91   Temp 98.2  F (36.8  C) (Oral)   Resp 18   Ht 1.727 m (5' 8\")   Wt 96.6 kg (213 lb)   SpO2 98%   BMI 32.39 kg/m    Body mass index is 32.39 kg/m .  Physical Exam  Constitutional:       General: He is not in acute distress.  Cardiovascular:      Rate and Rhythm: Normal rate and regular rhythm.   Pulmonary:      Effort: Pulmonary effort is normal.      Breath sounds: Normal breath sounds.   Abdominal:      General: Bowel sounds are normal.      Tenderness: There is abdominal tenderness (epigastric). There is no right CVA tenderness or left CVA tenderness.   Musculoskeletal:      Thoracic back: Tenderness (just lateral on right side of spine at about the level of the 10th rib) present.      Lumbar back: Decreased range of motion (extenstion due to pain).      Comments: Positive SLR on left   Skin:     Findings: No bruising, lesion or rash.   Neurological:      Mental Status: He is alert.                                "

## 2023-08-09 ENCOUNTER — TELEPHONE (OUTPATIENT)
Dept: FAMILY MEDICINE | Facility: OTHER | Age: 68
End: 2023-08-09

## 2023-08-09 DIAGNOSIS — M54.6 RIGHT-SIDED THORACIC BACK PAIN, UNSPECIFIED CHRONICITY: Primary | ICD-10-CM

## 2023-08-09 NOTE — TELEPHONE ENCOUNTER
Routing to provider    Pt calling stating he is interested in PT    Imaging note:   Ra Kennedy,     Your xray is unremarkable.  Do you want to try some physical therapy?     Fay Carbone RN

## 2023-08-14 ENCOUNTER — MYC MEDICAL ADVICE (OUTPATIENT)
Dept: FAMILY MEDICINE | Facility: CLINIC | Age: 68
End: 2023-08-14
Payer: COMMERCIAL

## 2023-08-14 DIAGNOSIS — M54.6 RIGHT-SIDED THORACIC BACK PAIN, UNSPECIFIED CHRONICITY: Primary | ICD-10-CM

## 2023-08-14 NOTE — TELEPHONE ENCOUNTER
Routing to provider to advise. Pt had office visit 8/8/23.    Michelle Cortes RN  [facility]

## 2023-08-15 ENCOUNTER — OFFICE VISIT (OUTPATIENT)
Dept: FAMILY MEDICINE | Facility: CLINIC | Age: 68
End: 2023-08-15
Payer: COMMERCIAL

## 2023-08-15 VITALS
SYSTOLIC BLOOD PRESSURE: 135 MMHG | DIASTOLIC BLOOD PRESSURE: 79 MMHG | WEIGHT: 214 LBS | OXYGEN SATURATION: 97 % | RESPIRATION RATE: 18 BRPM | HEIGHT: 68 IN | TEMPERATURE: 98.4 F | BODY MASS INDEX: 32.43 KG/M2 | HEART RATE: 82 BPM

## 2023-08-15 DIAGNOSIS — M54.6 RIGHT-SIDED THORACIC BACK PAIN, UNSPECIFIED CHRONICITY: Primary | ICD-10-CM

## 2023-08-15 PROCEDURE — 99214 OFFICE O/P EST MOD 30 MIN: CPT | Performed by: PHYSICIAN ASSISTANT

## 2023-08-15 RX ORDER — DIAZEPAM 5 MG
TABLET ORAL
Qty: 1 TABLET | Refills: 0 | Status: SHIPPED | OUTPATIENT
Start: 2023-08-15 | End: 2024-02-09

## 2023-08-15 ASSESSMENT — ENCOUNTER SYMPTOMS: BACK PAIN: 1

## 2023-08-15 NOTE — PROGRESS NOTES
Assessment & Plan     Right-sided thoracic back pain, unspecified chronicity  Suspect it is still msk but given age and the persistence and gnawing pain will get MRI.  Still start PT.  Will follow up when results are back.     - MR Lumbar Spine w/o Contrast; Future  - MR Thoracic Spine w/o Contrast; Future  - diazepam (VALIUM) 5 MG tablet; Take 30min before the procedure                 DANTE Bhandari [facility] MAHENDRA Kennedy is a 68 year old, presenting for the following health issues:  Back Pain (Follow up )        8/15/2023     9:18 AM   Additional Questions   Roomed by Chelo   Accompanied by self       History of Present Illness       Back Pain:  He presents for follow up of back pain. Patient's back pain is a chronic problem.  Location of back pain:  Right middle of back  Description of back pain: gnawing  Back pain spreads: nowhere    Since patient first noticed back pain, pain is: always present, but gets better and worse  Does back pain interfere with his job:  Not applicable       He eats 0-1 servings of fruits and vegetables daily.He consumes 1 sweetened beverage(s) daily.He exercises with enough effort to increase his heart rate 9 or less minutes per day.  He exercises with enough effort to increase his heart rate 3 or less days per week.   He is taking medications regularly.     Most pain is right side of spine mid back.  Putting pressure on that area is painful and pushing on ribs is painful.  Has this pain for 3 months.  Started after injuring his low back by lifting his dog.  This was not there when he injured his back but started about a week later.  Feels the pain is worsening.  Feels like someone has their knuckle pushed in his back.  No pain with eating. Deep breath hurts.  No shortness of breath or cough  No pain with urination or blood in urine or stool, normal stools.    Prednisone and muscle relaxants haven't helped much.    No weight loss.  No  "fevers.    This feel different than his pain in mid back before   Had CT of chest that was unremarkable and xray of mid back unremarkable.              Review of Systems   Musculoskeletal:  Positive for back pain.            Objective    /79   Pulse 82   Temp 98.4  F (36.9  C) (Oral)   Resp 18   Ht 1.727 m (5' 8\")   Wt 97.1 kg (214 lb)   SpO2 97%   BMI 32.54 kg/m    Body mass index is 32.54 kg/m .  Physical Exam  Constitutional:       General: He is not in acute distress.  Cardiovascular:      Rate and Rhythm: Normal rate and regular rhythm.   Pulmonary:      Effort: Pulmonary effort is normal.      Breath sounds: Normal breath sounds.   Abdominal:      Tenderness: There is abdominal tenderness (pain with palpation mid clavicular line about level of rib 9).   Musculoskeletal:      Thoracic back: Tenderness present.      Lumbar back: Normal.        Back:    Skin:     Findings: No rash.   Neurological:      Mental Status: He is alert.                              "

## 2023-08-21 ENCOUNTER — HOSPITAL ENCOUNTER (OUTPATIENT)
Dept: MRI IMAGING | Facility: CLINIC | Age: 68
Discharge: HOME | End: 2023-08-21
Attending: PHYSICIAN ASSISTANT | Admitting: PHYSICIAN ASSISTANT
Payer: COMMERCIAL

## 2023-08-21 DIAGNOSIS — M54.6 RIGHT-SIDED THORACIC BACK PAIN, UNSPECIFIED CHRONICITY: ICD-10-CM

## 2023-08-21 PROCEDURE — 72148 MRI LUMBAR SPINE W/O DYE: CPT

## 2023-08-21 PROCEDURE — 72146 MRI CHEST SPINE W/O DYE: CPT

## 2023-08-22 NOTE — RESULT ENCOUNTER NOTE
Brent,    Your MRI shows normal thoracic (middle) spine and mild lower back arthritis which is stable over time. Follow-up with your provider if symptoms are not helped by physical therapy.    Annabella Humphreys MD

## 2023-08-30 ENCOUNTER — THERAPY VISIT (OUTPATIENT)
Dept: PHYSICAL THERAPY | Facility: CLINIC | Age: 68
End: 2023-08-30
Attending: PHYSICIAN ASSISTANT
Payer: COMMERCIAL

## 2023-08-30 DIAGNOSIS — M54.6 RIGHT-SIDED THORACIC BACK PAIN, UNSPECIFIED CHRONICITY: ICD-10-CM

## 2023-08-30 DIAGNOSIS — M54.6 ACUTE RIGHT-SIDED THORACIC BACK PAIN: Primary | ICD-10-CM

## 2023-08-30 PROCEDURE — 97161 PT EVAL LOW COMPLEX 20 MIN: CPT | Mod: GP | Performed by: PHYSICAL THERAPIST

## 2023-08-30 PROCEDURE — 97110 THERAPEUTIC EXERCISES: CPT | Mod: GP | Performed by: PHYSICAL THERAPIST

## 2023-08-30 NOTE — PROGRESS NOTES
"PHYSICAL THERAPY EVALUATION  Type of Visit: Evaluation    See electronic medical record for Abuse and Falls Screening details.    Subjective       Presenting condition or subjective complaint: Back pain from midback to low back. Pain initially started in low back after carrying older and heavy dog at home that made his low back \"go out\". Eventually the low back pain improved but the midback pain worsened. Pain was very severe over the summer making it very hard to do his normal daily tasks. He is a retired  and spent hours lifting and moving heavy parts in a partially flexed position.  Date of onset: 06/20/23    Relevant medical history:     Dates & types of surgery: Left shoulder    Prior diagnostic imaging/testing results: MRI; CT scan     Prior therapy history for the same diagnosis, illness or injury: No      Prior Level of Function  Transfers:   Ambulation:   ADL:   IADL:     Living Environment  Social support: With a significant other or spouse   Type of home: House; Multi-level   Stairs to enter the home: Yes 2 Is there a railing: No   Ramp: No   Stairs inside the home: Yes 14 Is there a railing: Yes   Help at home: None  Equipment owned:       Employment: No    Hobbies/Interests: Fishing    Patient goals for therapy: Move around easier    Pain assessment: See objective evaluation for additional pain details     Objective   LUMBAR & THORACIC SPINE EVALUATION  PAIN: Pain Level at Rest: 1/10  Pain Level with Use: 3/10  Pain Location: thoracic spine and lumbar spine, primarily right sided  Pain Quality: Sharp, shooting; now achy  Pain Frequency: intermittent  Pain is Exacerbated By: bending, lifting, carrying & rotation especially  Pain is Relieved By: muscle relaxants  Pain Progression: Improved (self-reported 85% of normal)  Did not/does not have pain with coughing or sneezing   INTEGUMENTARY (edema, incisions):   POSTURE:   Gait Deviations:   BALANCE/PROPRIOCEPTION:   WEIGHTBEARING ALIGNMENT: "   NON-WEIGHTBEARING ALIGNMENT:    ROM:  Lumbar flexion to mid tibia (stiffness), extension mod loss (stiffness in hips mostly); thoracic rotation min loss both directions, extension mod loss and painful, flexion stiff but no pain.  PELVIC/SI SCREEN:   STRENGTH:  poor abdominal activation in supine (also has hiatal hernia)    MYOTOMES: WNL  DTR S:   CORD SIGNS:   DERMATOMES: WNL  NEURAL TENSION:   FLEXIBILITY:   LUMBAR/HIP Special Tests:    PELVIS/SI SPECIAL TESTS:   FUNCTIONAL TESTS: no rib pain with coughing or deep breathing  PALPATION:  R QL and T8-T10 region with related ribs on R side; muscles surrounding 10th rib most painful   SPINAL SEGMENTAL CONCLUSIONS:  hypo T8-12 and L3-5 primarily      Assessment & Plan   CLINICAL IMPRESSIONS  Medical Diagnosis: Right-sided thoracic back pain    Treatment Diagnosis: Right-sided thoracic back pain, low back pain   Impression/Assessment: Patient is a 68 year old male with thoracic and low back complaints.  The following significant findings have been identified: Pain, Decreased ROM/flexibility, Decreased joint mobility, and Decreased strength. These impairments interfere with their ability to perform recreational activities and household chores as compared to previous level of function.     Clinical Decision Making (Complexity):  Clinical Presentation: Stable/Uncomplicated  Clinical Presentation Rationale: based on medical and personal factors listed in PT evaluation  Clinical Decision Making (Complexity): Low complexity    PLAN OF CARE  Treatment Interventions:  Interventions: Manual Therapy, Neuromuscular Re-education, Therapeutic Activity, Therapeutic Exercise, Self-Care/Home Management    Long Term Goals     PT Goal 1  Goal Identifier: LTG 1  Goal Description: Patient will be able to lift >30 pounds without pain in mid and low back area  Rationale: to maximize safety and independence with self cares;to maximize safety and independence within the home  Target Date:  10/25/23      Frequency of Treatment: 1x/week  Duration of Treatment: 4 weeks then 2x/month for 1 months    Recommended Referrals to Other Professionals:  none  Education Assessment:   Learner/Method: No Barriers to Learning;Pictures/Video;Demonstration;Reading;Listening;Patient    Risks and benefits of evaluation/treatment have been explained.   Patient/Family/caregiver agrees with Plan of Care.     Evaluation Time:     PT Eval, Low Complexity Minutes (55270): 25       Signing Clinician: Marty Turner PT

## 2023-09-07 ENCOUNTER — THERAPY VISIT (OUTPATIENT)
Dept: PHYSICAL THERAPY | Facility: CLINIC | Age: 68
End: 2023-09-07
Attending: PHYSICIAN ASSISTANT
Payer: COMMERCIAL

## 2023-09-07 DIAGNOSIS — M54.6 ACUTE RIGHT-SIDED THORACIC BACK PAIN: ICD-10-CM

## 2023-09-07 DIAGNOSIS — M54.50 CHRONIC RIGHT-SIDED LOW BACK PAIN WITHOUT SCIATICA: Primary | ICD-10-CM

## 2023-09-07 DIAGNOSIS — G89.29 CHRONIC RIGHT-SIDED LOW BACK PAIN WITHOUT SCIATICA: Primary | ICD-10-CM

## 2023-09-07 PROCEDURE — 97110 THERAPEUTIC EXERCISES: CPT | Mod: GP | Performed by: PHYSICAL THERAPIST

## 2023-09-07 PROCEDURE — 97112 NEUROMUSCULAR REEDUCATION: CPT | Mod: GP | Performed by: PHYSICAL THERAPIST

## 2023-09-07 PROCEDURE — 97140 MANUAL THERAPY 1/> REGIONS: CPT | Mod: GP | Performed by: PHYSICAL THERAPIST

## 2023-09-22 ENCOUNTER — THERAPY VISIT (OUTPATIENT)
Dept: PHYSICAL THERAPY | Facility: CLINIC | Age: 68
End: 2023-09-22
Attending: PHYSICIAN ASSISTANT
Payer: COMMERCIAL

## 2023-09-22 DIAGNOSIS — M54.50 CHRONIC RIGHT-SIDED LOW BACK PAIN WITHOUT SCIATICA: Primary | ICD-10-CM

## 2023-09-22 DIAGNOSIS — G89.29 CHRONIC RIGHT-SIDED LOW BACK PAIN WITHOUT SCIATICA: Primary | ICD-10-CM

## 2023-09-22 DIAGNOSIS — M54.6 ACUTE RIGHT-SIDED THORACIC BACK PAIN: ICD-10-CM

## 2023-09-22 PROCEDURE — 97140 MANUAL THERAPY 1/> REGIONS: CPT | Mod: GP | Performed by: PHYSICAL THERAPIST

## 2023-09-22 PROCEDURE — 97110 THERAPEUTIC EXERCISES: CPT | Mod: GP | Performed by: PHYSICAL THERAPIST

## 2023-10-06 ENCOUNTER — THERAPY VISIT (OUTPATIENT)
Dept: PHYSICAL THERAPY | Facility: CLINIC | Age: 68
End: 2023-10-06
Payer: COMMERCIAL

## 2023-10-06 DIAGNOSIS — G89.29 CHRONIC RIGHT-SIDED LOW BACK PAIN WITHOUT SCIATICA: Primary | ICD-10-CM

## 2023-10-06 DIAGNOSIS — M54.6 ACUTE RIGHT-SIDED THORACIC BACK PAIN: ICD-10-CM

## 2023-10-06 DIAGNOSIS — M54.50 CHRONIC RIGHT-SIDED LOW BACK PAIN WITHOUT SCIATICA: Primary | ICD-10-CM

## 2023-10-06 PROCEDURE — 97140 MANUAL THERAPY 1/> REGIONS: CPT | Mod: GP | Performed by: PHYSICAL THERAPIST

## 2023-10-06 PROCEDURE — 97110 THERAPEUTIC EXERCISES: CPT | Mod: GP | Performed by: PHYSICAL THERAPIST

## 2023-10-23 ENCOUNTER — THERAPY VISIT (OUTPATIENT)
Dept: PHYSICAL THERAPY | Facility: CLINIC | Age: 68
End: 2023-10-23
Payer: COMMERCIAL

## 2023-10-23 DIAGNOSIS — M54.6 ACUTE RIGHT-SIDED THORACIC BACK PAIN: ICD-10-CM

## 2023-10-23 DIAGNOSIS — G89.29 CHRONIC RIGHT-SIDED LOW BACK PAIN WITHOUT SCIATICA: Primary | ICD-10-CM

## 2023-10-23 DIAGNOSIS — M54.50 CHRONIC RIGHT-SIDED LOW BACK PAIN WITHOUT SCIATICA: Primary | ICD-10-CM

## 2023-10-23 PROCEDURE — 97112 NEUROMUSCULAR REEDUCATION: CPT | Mod: GP | Performed by: PHYSICAL THERAPIST

## 2023-10-23 PROCEDURE — 97110 THERAPEUTIC EXERCISES: CPT | Mod: GP | Performed by: PHYSICAL THERAPIST

## 2023-10-23 PROCEDURE — 97140 MANUAL THERAPY 1/> REGIONS: CPT | Mod: GP | Performed by: PHYSICAL THERAPIST

## 2023-11-13 ENCOUNTER — THERAPY VISIT (OUTPATIENT)
Dept: PHYSICAL THERAPY | Facility: CLINIC | Age: 68
End: 2023-11-13
Payer: COMMERCIAL

## 2023-11-13 DIAGNOSIS — M54.6 ACUTE RIGHT-SIDED THORACIC BACK PAIN: ICD-10-CM

## 2023-11-13 DIAGNOSIS — G89.29 CHRONIC RIGHT-SIDED LOW BACK PAIN WITHOUT SCIATICA: Primary | ICD-10-CM

## 2023-11-13 DIAGNOSIS — M54.50 CHRONIC RIGHT-SIDED LOW BACK PAIN WITHOUT SCIATICA: Primary | ICD-10-CM

## 2023-11-13 PROCEDURE — 97110 THERAPEUTIC EXERCISES: CPT | Mod: GP | Performed by: PHYSICAL THERAPIST

## 2023-11-13 PROCEDURE — 97140 MANUAL THERAPY 1/> REGIONS: CPT | Mod: GP | Performed by: PHYSICAL THERAPIST

## 2023-11-13 PROCEDURE — 97112 NEUROMUSCULAR REEDUCATION: CPT | Mod: GP | Performed by: PHYSICAL THERAPIST

## 2023-11-13 NOTE — PROGRESS NOTES
DISCHARGE  Reason for Discharge: Patient is progressing towards goal w/ occasional set backs. Will trial independent management of sx w/ HEP/    Equipment Issued: none    Discharge Plan: Patient to continue home program.    Referring Provider:  Fay Cuellar       11/13/23 0500   Appointment Info   Signing clinician's name / credentials Amanda Hilligoss, DPT; Maribel Go, SPT   Total/Authorized Visits 6 (PT POC)   Visits Used 6   Medical Diagnosis Right-sided thoracic back pain   PT Tx Diagnosis Right-sided thoracic back pain, low back pain   Quick Adds Student Supervision   Progress Note/Certification   Onset of illness/injury or Date of Surgery 06/20/23   Therapy Frequency 1x/week   Predicted Duration 4 weeks then 2x/month for 1 months   Progress Note Completed Date 08/30/23   Supervision   Student Supervision Direct supervision provided;Direct Patient Contact Provided   PT Goal 1   Goal Identifier LTG 1   Goal Description Patient will be able to lift >30 pounds without pain in mid and low back area   Rationale to maximize safety and independence with self cares;to maximize safety and independence within the home   Goal Progress Has been able to lift >30 lb w/ pain in mid back after, to 3/10   Target Date 11/20/23  (has pain after repetitive or prolonged lifting, but can do more before pain increase, will DC to HEP for now)   Subjective Report   Subjective Report Pt notes he was overall feeling better for past few weeks. Was cutting down trees over past 2 days so woke up last night w/ increased pain in the R side of his thoracic spine. Has not done HEP much over past few days. Feels he is ready to try independent management of sx w/ HEP, but will return if current exacerbation does not improve over next week.   Objective Measures   Objective Measures Objective Measure 1;Objective Measure 2;Objective Measure 3   Objective Measure 1   Objective Measure Thoracic AROM   Details Flex min limitation, Ext  50% +pain, Rotation L min limitation, R mod limitation +pain R   Objective Measure 2   Objective Measure Palpation   Details Generalized hypomobility throughout thoracic spine, tender and hypomobile fascia R>L   Objective Measure 3   Objective Measure Joint mobility   Details Hypomobile mid thoracic spine (T4-6), Hypomobile ribs R>L   Treatment Interventions (PT)   Interventions Therapeutic Procedure/Exercise;Neuromuscular Re-education;Manual Therapy   Therapeutic Procedure/Exercise   Therapeutic Procedures: strength, endurance, ROM, flexibillity minutes (81811) 12   Ther Proc 1 Thoracic ext   Ther Proc 1 - Details x10 over bolset   PTRx Ther Proc 1 Thoracic rotation   PTRx Ther Proc 1 - Details x10 B alt   PTRx Ther Proc 2 Edu re: importance of frequent directional preference exercises when doing repeptitive forward activities   PTRx Ther Proc 2 - Details emphasis on regluar thoracic ext or going for walks when doing repeated bending or chopping tasks   Skilled Intervention manual, verbal, and visual cuing + edu re: pogression to independent HEP   Patient Response/Progress increased stiffness today, but feels better after ROM exercises   Neuromuscular Re-education   Neuromuscular re-ed of mvmt, balance, coord, kinesthetic sense, posture, proprioception minutes (41935) 9   Neuro Re-ed 4 Prone arm raise for posture muscle activation   Neuro Re-ed 4 - Details I x 20 slow, T x 10, W (modified to row due to shoulder stiffness) x 10   Neuro Re-ed 5 Prone arm raises   Neuro Re-ed 5 - Details w/ sustained cervical retraction x 10 x 2 sets   Skilled Intervention manual cuing to decrease overuse of upper traps   Patient Response/Progress some fatigue, but good form and no pain increase   Manual Therapy   Manual Therapy: Mobilization, MFR, MLD, friction massage minutes (76091) 17   Manual Therapy Manual Therapy 2;Manual Therapy 3   Manual Therapy 1 STM   Manual Therapy 1 - Details Inferior fascial glide, fascial rolling  through thoracic and low back   Manual Therapy 2 Joint Mobilization   Manual Therapy 2 - Details Rib springing   Skilled Intervention adjustment of technique, intensity, and location based on patient tolerance and tissue response   Patient Response/Progress decreased muscle tension in R side of thoracic spine after MT   Education   Learner/Method No Barriers to Learning;Pictures/Video;Demonstration;Reading;Listening;Patient   Plan   Home program Continue extension exercises, increase frequency especially in morning when working bent over   Updates to plan of care DC   Total Session Time   Timed Code Treatment Minutes 38   Total Treatment Time (sum of timed and untimed services) 38

## 2024-01-27 ENCOUNTER — HEALTH MAINTENANCE LETTER (OUTPATIENT)
Age: 69
End: 2024-01-27

## 2024-02-09 ENCOUNTER — VIRTUAL VISIT (OUTPATIENT)
Dept: FAMILY MEDICINE | Facility: CLINIC | Age: 69
End: 2024-02-09
Payer: COMMERCIAL

## 2024-02-09 DIAGNOSIS — I10 BENIGN ESSENTIAL HYPERTENSION: ICD-10-CM

## 2024-02-09 DIAGNOSIS — K44.9 HIATAL HERNIA: ICD-10-CM

## 2024-02-09 DIAGNOSIS — M25.562 CHRONIC PAIN OF LEFT KNEE: Primary | ICD-10-CM

## 2024-02-09 DIAGNOSIS — G89.29 CHRONIC PAIN OF LEFT KNEE: Primary | ICD-10-CM

## 2024-02-09 PROCEDURE — 99214 OFFICE O/P EST MOD 30 MIN: CPT | Mod: 95 | Performed by: PHYSICIAN ASSISTANT

## 2024-02-09 RX ORDER — LISINOPRIL 30 MG/1
30 TABLET ORAL DAILY
Qty: 90 TABLET | Refills: 1 | Status: SHIPPED | OUTPATIENT
Start: 2024-02-09 | End: 2024-08-07

## 2024-02-09 RX ORDER — OMEPRAZOLE 40 MG/1
CAPSULE, DELAYED RELEASE ORAL
Qty: 90 CAPSULE | Refills: 1 | Status: SHIPPED | OUTPATIENT
Start: 2024-02-09 | End: 2024-08-05

## 2024-02-09 NOTE — PROGRESS NOTES
"Brent is a 68 year old who is being evaluated via a billable video visit.      How would you like to obtain your AVS? MyChart  If the video visit is dropped, the invitation should be resent by: Text to cell phone: [phone]  Will anyone else be joining your video visit? No          Assessment & Plan     Chronic pain of left knee  See ortho.    - Orthopedic  Referral; Future    Benign essential hypertension  refilled  - lisinopril (ZESTRIL) 30 MG tablet; Take 1 tablet (30 mg) by mouth daily    Hiatal hernia  refilled  - omeprazole (PRILOSEC) 40 MG DR capsule; TAKE 1 CAPSULE(40 MG) BY MOUTH DAILY WITH DINNER Strength: 40 mg            BMI  Estimated body mass index is 32.54 kg/m  as calculated from the following:    Height as of 8/15/23: 1.727 m (5' 8\").    Weight as of 8/15/23: 97.1 kg (214 lb).   Weight management plan: not addressed           Subjective   Brent is a 68 year old, presenting for the following health issues:  RECHECK        2/9/2024     1:09 PM   Additional Questions   Roomed by Chelo   Accompanied by self     History of Present Illness       Reason for visit:  Back and knee and refill meds.  Symptom onset:  More than a month  Symptoms include:  Back and knee  Symptom intensity:  Moderate  Symptom progression:  Staying the same  Had these symptoms before:  Yes  Has tried/received treatment for these symptoms:  Yes  Previous treatment was successful:  Yes  Prior treatment description:  Physical therapy    He eats 0-1 servings of fruits and vegetables daily.He consumes 1 sweetened beverage(s) daily.He exercises with enough effort to increase his heart rate 9 or less minutes per day.  He exercises with enough effort to increase his heart rate 3 or less days per week.   He is taking medications regularly.   His back was getting better with PT but once he stopped it got worse again.  He does note he is not doing home PT.    His left knee had an injury at age 24.  Was suppose to do surgery but " never did.  Was then told he would need a knee replacement and now it is really becoming bothersome and limiting his movement.  He is ready to see ortho again.                  Objective           Vitals:  No vitals were obtained today due to virtual visit.    Physical Exam   GENERAL: alert and no distress  EYES: Eyes grossly normal to inspection.  No discharge or erythema, or obvious scleral/conjunctival abnormalities.  RESP: No audible wheeze, cough, or visible cyanosis.    SKIN: Visible skin clear. No significant rash, abnormal pigmentation or lesions.  NEURO: Cranial nerves grossly intact.  Mentation and speech appropriate for age.  PSYCH: Appropriate affect, tone, and pace of words          Video-Visit Details    Type of service:  Video Visit     Originating Location (pt. Location): Home    Distant Location (provider location):  On-site  Platform used for Video Visit: Eneida  Signed Electronically by: Fay Cuellar PA-C

## 2024-02-28 NOTE — PROGRESS NOTES
Krishna Dugan  :  1955  DOS: 3/5/2024  MRN: [id number]  PCP: Fay Cuellar    Sports Medicine Clinic Visit      HPI  Krishna Dugan is a 68 year old male who is seen in consultation at the request of  Fay Cuellar PA-C presenting with left knee pain.    - Mechanism of Injury:  No inciting injury.   - Prior evaluation:    - 2024 with Fay Cuellar PA-C: Left knee pain. Notes injury at age 24 but no surgery at that time. Has been told he would need a replacement. Referred to Ortho.   - PHx of medial meniscus tear on the right knee in 2018. Treated by Dr. Arnold. Received CSI and Mobic.    - Of note, patient states that prior evaluations and treatment were always for the left knee, he does not believe his right knee was treated.  This may need to be clarified through medical records if interested.  - PHx of the medial meniscus tear in the left knee in .    - Pain Character:  Pain has been present for  many years chronically . Is increasing in symptoms over the past few weeks.  Pain is well localized to the left anteromedial knee without significant radiation.   - Endorses:  swelling, occasional limping due to medial knee pain,  - Denies:  clicking, popping, grinding, mechanical locking symptoms, instability, numbness, tingling, radicular shooting pain, weakness.   - Alleviating factors: Rest, activity modifications, corticosteroid injection  - Aggravating factors:  ambulation, weightbearing, pivoting  - Treatments tried:   mobic, corticosteroid injection , Advil, Tylenol    - Patient Goals:  get a formal diagnosis, discuss treatment options  - Social History: retired      Review of Systems  Musculoskeletal: as above  Remainder of review of systems is negative including constitutional, CV, pulmonary, GI, Skin and Neurologic except as noted in HPI or medical history.    Past Medical History:   Diagnosis Date    Diagnostic skin and sensitization tests  skin tests pos.  "for cat/dog ONLY (all other environmental allergens NEGATIVE)     Past Surgical History:   Procedure Laterality Date    ESOPHAGOSCOPY, GASTROSCOPY, DUODENOSCOPY (EGD), COMBINED N/A 3/14/2023    Procedure: ESOPHAGOGASTRODUODENOSCOPY (EGD);  Surgeon: Sanchez Franco MD;  [facility]     No family history on file.      Objective  Ht 1.727 m (5' 8\")   Wt 97.1 kg (214 lb)   BMI 32.54 kg/m      General: healthy, alert and in no acute distress.    HEENT: no scleral icterus or conjunctival erythema.   Skin: no suspicious lesions or rash. No jaundice.   CV: regular rhythm by palpation, 2+ distal pulses.  Resp: normal respiratory effort without conversational dyspnea.   Psych: normal mood and affect.    Gait: nonantalgic, appropriate coordination and balance.     Neuro:        - Sensation to light touch:    - Intact throughout the BLE including all peripheral nerve distributions.        - MSR:      RLE  LLE  - Patella 2+ 2+  - Achilles 2+ 2+       - Special tests:   - Slump/SLR:  Neg    MSK - Knee:       - Inspection:    - No significant effusion, erythema, warmth, ecchymosis, lesion.        - ROM:    - Full AROM/PROM with pain during knee flexion/extension.        - Palpation:    - TTP at the medial joint line.  - NTTP elsewhere.        - Strength:  (*antalgic)  RLE LLE  - Hip Flexion  5 5    - Hip Abduction 5 5   - Hip Adduction 5 5  - Knee Flexion  5 5  - Knee Extension 5 5  - Dorsiflexion  5 5  - Plantarflexion 5 5  - Ext. Keith. Longus 5 5  - Inversion  5 5  - Eversion  5 5       - Special tests:        - Lachman:  Neg         - A/P drawer:  Neg        - Pivot shift:  Neg    - Sonja:  Neg      - Varus stress:  Neg for laxity or pain     - Valgus stress:  Neg for laxity or pain    - Patellar grind: Positive   - Thessaly:  Neg     Radiology  I independently reviewed today's new relevant imaging, with the following interpretation:  - XR L knee 3/5/2024 shows severe degenerative changes in the medial compartment with " bone-on-bone articulation, mild degenerative changes of the patellofemoral compartment.  There is a small knee joint effusion present.  Enthesopathy at the inferior patella and tibial tuberosity.  No acute fractures or dislocations.      Procedure  Large Joint Injection/Arthocentesis: L knee joint    Date/Time: 3/5/2024 11:04 AM    Performed by: Dilshad Mosquera DO  Authorized by: Dilshad Mosquera DO    Indications:  Pain  Needle Size comment:  23 g 1.5 in  Guidance: landmark guided    Approach:  Anterolateral  Location:  Knee      Medications:  40 mg triamcinolone 40 MG/ML; 2 mL lidocaine 1 %; 2 mL BUPivacaine 0.5 %  Outcome:  Tolerated well, no immediate complications  Procedure discussed: discussed risks, benefits, and alternatives    Consent Given by:  Patient  Prep: patient was prepped and draped in usual sterile fashion           PROCEDURE  Intraarticular Knee Joint Injection - Left  The patient was informed of the risks and benefits of the procedure and alternatives were discussed. A written consent was signed by the patient.   The injection site was prepped with chlorhexidine in sterile fashion.   An injectate solution containing 2 mL of 1% lidocaine, 2 mL of 0.5% bupivacaine, and 1 mL of Kenalog (40 mg/mL) was drawn up into a 5 mL syringe.  Injection was performed using sterile technique.  A 1.5-inch 22-gauge needle was used to enter the knee joint using a lateral infrapatellar approach and injectate was injected successfully. After the injection, the site was cleaned and a bandage applied. The patient tolerated the procedure well without complications.         Assessment  1. Primary osteoarthritis of left knee        Plan  Krishna Dugan is a 68 year old male that presents with acute on chronic left knee pain.  He describes pain in the medial knee for many years, with worsening over the past several weeks.  Pain with walking, stairs, getting in and out of chairs, and other weightbearing activities.   Associated swelling intermittently.  Denies mechanical locking symptoms, instability.  Radiographs reveal severe degenerative changes of the medial compartment on the left knee with bone-on-bone articulation.  History and physical exam appear most consistent with severe primary osteoarthritis of the left knee, affecting the medial compartment most prominently.     Discussed the nature of the condition and treatment options and mutually agreed upon the following plan:    - Imaging:          - Reviewed relevant imaging in the chart.  -XR L knee ordered today pre-clinic and independently interpreted by myself.    - Reviewed results and images with patient.   - Medications:          - Discussed pharmacologic options for pain relief.   - May use NSAIDs (Ibuprofen) or Acetaminophen (Tylenol) as needed for pain control.  Tends to avoid naproxen for a hiatal hernia.  - May also use topical medications such as lidocaine, IcyHot, BioFreeze, or Voltaren gel as needed for pain control.   - Injections:          - Discussed possible injection options and alternatives.    - Options include intra-articular knee joint injection with corticosteroid, viscosupplementation, or PRP..   - Performed a corticosteroid injection of the intra-articular left knee joint today in clinic. Patient tolerated the procedure well without complications.    - Post-procedure instructions:    - Keep the injection site clean and dry.   - Do not submerge the injection site for 24 hours (no baths, pools). Showers are ok.   - Rest the area for 24-48 hours before resuming normal activities. Avoid overexerting the area for the first few weeks.   - It may take 2-3 days to start noticing the effects of the injection and up to 3-4 weeks to feel significant benefits.   - Therapy:          - Discussed the benefits of physical therapy vs home exercise program for optimization of range of motion, flexibility, strength, stability and function.   - Preference is for a  home exercise program.   - Home Exercise Program given today in clinic and recommendation given to perform HEP daily and after exacerbations.  - Modalities:          - May use ice, heat, massage or other modalities as needed.   - Bracing:          - Discussed bracing options and recommend using a hinged knee stability brace or a medial  brace when ambulating.  Interested in an  brace and a referral was placed for our  bracing program through PT.  Phone number was given to call PT for the fitting.  - Surgery:          - Discussed non-operative and operative treatment options for the patient's condition.   - His goal is to continue conservative care for as long as possible before surgical replacement could be considered.  - Activity:          - Encouraged to remain active and participate in regular activities as symptoms allow.  Avoid or modify exacerbating activities as needed.  - Follow up:          - In 3+ months as needed for re-evaluation and update to treatment plan, or sooner for new/worsening symptoms.  - Patient has clinic contact information for questions or concerns.       iDlshad Mosquera DO, GEREMIASM  [facility] - Sports Medicine  [facility] - Department of Orthopedic Surgery       Disclaimer:  This note was prepared and written using Dragon Medical dictation software. As a result, there may be errors in the script that have gone undetected. Please consider this when interpreting the information in this note.

## 2024-03-05 ENCOUNTER — ANCILLARY PROCEDURE (OUTPATIENT)
Dept: GENERAL RADIOLOGY | Facility: OTHER | Age: 69
End: 2024-03-05
Attending: STUDENT IN AN ORGANIZED HEALTH CARE EDUCATION/TRAINING PROGRAM
Payer: COMMERCIAL

## 2024-03-05 ENCOUNTER — OFFICE VISIT (OUTPATIENT)
Dept: ORTHOPEDICS | Facility: OTHER | Age: 69
End: 2024-03-05
Payer: COMMERCIAL

## 2024-03-05 VITALS — HEIGHT: 68 IN | BODY MASS INDEX: 32.43 KG/M2 | WEIGHT: 214 LBS

## 2024-03-05 DIAGNOSIS — G89.29 CHRONIC PAIN OF LEFT KNEE: ICD-10-CM

## 2024-03-05 DIAGNOSIS — M25.562 CHRONIC PAIN OF LEFT KNEE: ICD-10-CM

## 2024-03-05 DIAGNOSIS — M17.12 PRIMARY OSTEOARTHRITIS OF LEFT KNEE: Primary | ICD-10-CM

## 2024-03-05 PROCEDURE — 20610 DRAIN/INJ JOINT/BURSA W/O US: CPT | Mod: LT | Performed by: STUDENT IN AN ORGANIZED HEALTH CARE EDUCATION/TRAINING PROGRAM

## 2024-03-05 PROCEDURE — 99204 OFFICE O/P NEW MOD 45 MIN: CPT | Mod: 25 | Performed by: STUDENT IN AN ORGANIZED HEALTH CARE EDUCATION/TRAINING PROGRAM

## 2024-03-05 PROCEDURE — 73564 X-RAY EXAM KNEE 4 OR MORE: CPT | Mod: TC | Performed by: RADIOLOGY

## 2024-03-05 RX ORDER — LIDOCAINE HYDROCHLORIDE 10 MG/ML
2 INJECTION, SOLUTION INFILTRATION; PERINEURAL
Status: DISCONTINUED | OUTPATIENT
Start: 2024-03-05 | End: 2024-09-06

## 2024-03-05 RX ORDER — TRIAMCINOLONE ACETONIDE 40 MG/ML
40 INJECTION, SUSPENSION INTRA-ARTICULAR; INTRAMUSCULAR
Status: DISCONTINUED | OUTPATIENT
Start: 2024-03-05 | End: 2024-09-06

## 2024-03-05 RX ORDER — BUPIVACAINE HYDROCHLORIDE 5 MG/ML
2 INJECTION, SOLUTION PERINEURAL
Status: DISCONTINUED | OUTPATIENT
Start: 2024-03-05 | End: 2024-09-06

## 2024-03-05 RX ADMIN — LIDOCAINE HYDROCHLORIDE 2 ML: 10 INJECTION, SOLUTION INFILTRATION; PERINEURAL at 11:04

## 2024-03-05 RX ADMIN — BUPIVACAINE HYDROCHLORIDE 2 ML: 5 INJECTION, SOLUTION PERINEURAL at 11:04

## 2024-03-05 RX ADMIN — TRIAMCINOLONE ACETONIDE 40 MG: 40 INJECTION, SUSPENSION INTRA-ARTICULAR; INTRAMUSCULAR at 11:04

## 2024-03-05 SDOH — HEALTH STABILITY: PHYSICAL HEALTH: ON AVERAGE, HOW MANY MINUTES DO YOU ENGAGE IN EXERCISE AT THIS LEVEL?: 90 MIN

## 2024-03-05 SDOH — HEALTH STABILITY: PHYSICAL HEALTH: ON AVERAGE, HOW MANY DAYS PER WEEK DO YOU ENGAGE IN MODERATE TO STRENUOUS EXERCISE (LIKE A BRISK WALK)?: 3 DAYS

## 2024-03-05 ASSESSMENT — PAIN SCALES - GENERAL: PAINLEVEL: MILD PAIN (2)

## 2024-03-05 NOTE — LETTER
3/5/2024         RE: Krishna Dugan  [id number] [address] [id number]        Dear Colleague,    Thank you for referring your patient, Krishna Dugan, to the [facility] [place]. Please see a copy of my visit note below.    Krishna Dugan  :  1955  DOS: 3/5/2024  MRN: [id number]  PCP: Fay Cuellar    Sports Medicine Clinic Visit      HPI  Krishna Dugan is a 68 year old male who is seen in consultation at the request of  Fay Cuellar PA-C presenting with left knee pain.    - Mechanism of Injury:  No inciting injury.   - Prior evaluation:    - 2024 with Fay Cuellar PA-C: Left knee pain. Notes injury at age 24 but no surgery at that time. Has been told he would need a replacement. Referred to Ortho.   - PHx of medial meniscus tear on the right knee in 2018. Treated by Dr. Arnold. Received CSI and Mobic.    - Of note, patient states that prior evaluations and treatment were always for the left knee, he does not believe his right knee was treated.  This may need to be clarified through medical records if interested.  - PHx of the medial meniscus tear in the left knee in .    - Pain Character:  Pain has been present for  many years chronically . Is increasing in symptoms over the past few weeks.  Pain is well localized to the left anteromedial knee without significant radiation.   - Endorses:  swelling, occasional limping due to medial knee pain,  - Denies:  clicking, popping, grinding, mechanical locking symptoms, instability, numbness, tingling, radicular shooting pain, weakness.   - Alleviating factors: Rest, activity modifications, corticosteroid injection  - Aggravating factors:  ambulation, weightbearing, pivoting  - Treatments tried:   mobic, corticosteroid injection , Advil, Tylenol    - Patient Goals:  get a formal diagnosis, discuss treatment options  - Social History: retired      Review of  "Systems  Musculoskeletal: as above  Remainder of review of systems is negative including constitutional, CV, pulmonary, GI, Skin and Neurologic except as noted in HPI or medical history.    Past Medical History:   Diagnosis Date     Diagnostic skin and sensitization tests 9/08 skin tests pos. for cat/dog ONLY (all other environmental allergens NEGATIVE)     Past Surgical History:   Procedure Laterality Date     ESOPHAGOSCOPY, GASTROSCOPY, DUODENOSCOPY (EGD), COMBINED N/A 3/14/2023    Procedure: ESOPHAGOGASTRODUODENOSCOPY (EGD);  Surgeon: Sanchez Franco MD;  Location: [facility]     No family history on file.      Objective  Ht 1.727 m (5' 8\")   Wt 97.1 kg (214 lb)   BMI 32.54 kg/m      General: healthy, alert and in no acute distress.    HEENT: no scleral icterus or conjunctival erythema.   Skin: no suspicious lesions or rash. No jaundice.   CV: regular rhythm by palpation, 2+ distal pulses.  Resp: normal respiratory effort without conversational dyspnea.   Psych: normal mood and affect.    Gait: nonantalgic, appropriate coordination and balance.     Neuro:        - Sensation to light touch:    - Intact throughout the BLE including all peripheral nerve distributions.        - MSR:      RLE  LLE  - Patella 2+ 2+  - Achilles 2+ 2+       - Special tests:   - Slump/SLR:  Neg    MSK - Knee:       - Inspection:    - No significant effusion, erythema, warmth, ecchymosis, lesion.        - ROM:    - Full AROM/PROM with pain during knee flexion/extension.        - Palpation:    - TTP at the medial joint line.  - NTTP elsewhere.        - Strength:  (*antalgic)  RLE LLE  - Hip Flexion  5 5    - Hip Abduction 5 5   - Hip Adduction 5 5  - Knee Flexion  5 5  - Knee Extension 5 5  - Dorsiflexion  5 5  - Plantarflexion 5 5  - Ext. Keith. Longus 5 5  - Inversion  5 5  - Eversion  5 5       - Special tests:        - Lachman:  Neg         - A/P drawer:  Neg        - Pivot shift:  Neg    - Sonja:  Neg      - Varus stress:  Neg for " laxity or pain     - Valgus stress:  Neg for laxity or pain    - Patellar grind: Positive   - Thessaly:  Neg     Radiology  I independently reviewed today's new relevant imaging, with the following interpretation:  - XR L knee 3/5/2024 shows severe degenerative changes in the medial compartment with bone-on-bone articulation, mild degenerative changes of the patellofemoral compartment.  There is a small knee joint effusion present.  Enthesopathy at the inferior patella and tibial tuberosity.  No acute fractures or dislocations.      Procedure  Large Joint Injection/Arthocentesis: L knee joint    Date/Time: 3/5/2024 11:04 AM    Performed by: Dilshad Mosquera DO  Authorized by: Dilshad Mosquera DO    Indications:  Pain  Needle Size comment:  23 g 1.5 in  Guidance: landmark guided    Approach:  Anterolateral  Location:  Knee      Medications:  40 mg triamcinolone 40 MG/ML; 2 mL lidocaine 1 %; 2 mL BUPivacaine 0.5 %  Outcome:  Tolerated well, no immediate complications  Procedure discussed: discussed risks, benefits, and alternatives    Consent Given by:  Patient  Prep: patient was prepped and draped in usual sterile fashion           PROCEDURE  Intraarticular Knee Joint Injection - Left  The patient was informed of the risks and benefits of the procedure and alternatives were discussed. A written consent was signed by the patient.   The injection site was prepped with chlorhexidine in sterile fashion.   An injectate solution containing 2 mL of 1% lidocaine, 2 mL of 0.5% bupivacaine, and 1 mL of Kenalog (40 mg/mL) was drawn up into a 5 mL syringe.  Injection was performed using sterile technique.  A 1.5-inch 22-gauge needle was used to enter the knee joint using a lateral infrapatellar approach and injectate was injected successfully. After the injection, the site was cleaned and a bandage applied. The patient tolerated the procedure well without complications.         Assessment  1. Primary osteoarthritis of left knee         Plan  Krishna Dugan is a 68 year old male that presents with acute on chronic left knee pain.  He describes pain in the medial knee for many years, with worsening over the past several weeks.  Pain with walking, stairs, getting in and out of chairs, and other weightbearing activities.  Associated swelling intermittently.  Denies mechanical locking symptoms, instability.  Radiographs reveal severe degenerative changes of the medial compartment on the left knee with bone-on-bone articulation.  History and physical exam appear most consistent with severe primary osteoarthritis of the left knee, affecting the medial compartment most prominently.     Discussed the nature of the condition and treatment options and mutually agreed upon the following plan:    - Imaging:          - Reviewed relevant imaging in the chart.  -XR L knee ordered today pre-clinic and independently interpreted by myself.    - Reviewed results and images with patient.   - Medications:          - Discussed pharmacologic options for pain relief.   - May use NSAIDs (Ibuprofen) or Acetaminophen (Tylenol) as needed for pain control.  Tends to avoid naproxen for a hiatal hernia.  - May also use topical medications such as lidocaine, IcyHot, BioFreeze, or Voltaren gel as needed for pain control.   - Injections:          - Discussed possible injection options and alternatives.    - Options include intra-articular knee joint injection with corticosteroid, viscosupplementation, or PRP..   - Performed a corticosteroid injection of the intra-articular left knee joint today in clinic. Patient tolerated the procedure well without complications.    - Post-procedure instructions:    - Keep the injection site clean and dry.   - Do not submerge the injection site for 24 hours (no baths, pools). Showers are ok.   - Rest the area for 24-48 hours before resuming normal activities. Avoid overexerting the area for the first few weeks.   - It may take 2-3 days to  start noticing the effects of the injection and up to 3-4 weeks to feel significant benefits.   - Therapy:          - Discussed the benefits of physical therapy vs home exercise program for optimization of range of motion, flexibility, strength, stability and function.   - Preference is for a home exercise program.   - Home Exercise Program given today in clinic and recommendation given to perform HEP daily and after exacerbations.  - Modalities:          - May use ice, heat, massage or other modalities as needed.   - Bracing:          - Discussed bracing options and recommend using a hinged knee stability brace or a medial  brace when ambulating.  Interested in an  brace and a referral was placed for our  bracing program through PT.  Phone number was given to call PT for the fitting.  - Surgery:          - Discussed non-operative and operative treatment options for the patient's condition.   - His goal is to continue conservative care for as long as possible before surgical replacement could be considered.  - Activity:          - Encouraged to remain active and participate in regular activities as symptoms allow.  Avoid or modify exacerbating activities as needed.  - Follow up:          - In 3+ months as needed for re-evaluation and update to treatment plan, or sooner for new/worsening symptoms.  - Patient has clinic contact information for questions or concerns.       Dilshad Mosquera DO CAQSM  [facility] - Sports Medicine  [facility] - Department of Orthopedic Surgery       Disclaimer:  This note was prepared and written using Dragon Medical dictation software. As a result, there may be errors in the script that have gone undetected. Please consider this when interpreting the information in this note.       Again, thank you for allowing me to participate in the care of your patient.        Sincerely,        Dilshad Mosquera DO

## 2024-03-21 ENCOUNTER — TELEPHONE (OUTPATIENT)
Dept: PHYSICAL THERAPY | Facility: CLINIC | Age: 69
End: 2024-03-21

## 2024-03-21 ENCOUNTER — THERAPY VISIT (OUTPATIENT)
Dept: PHYSICAL THERAPY | Facility: CLINIC | Age: 69
End: 2024-03-21
Attending: STUDENT IN AN ORGANIZED HEALTH CARE EDUCATION/TRAINING PROGRAM
Payer: COMMERCIAL

## 2024-03-21 DIAGNOSIS — M25.562 CHRONIC PAIN OF LEFT KNEE: Primary | ICD-10-CM

## 2024-03-21 DIAGNOSIS — G89.29 CHRONIC PAIN OF LEFT KNEE: Primary | ICD-10-CM

## 2024-03-21 DIAGNOSIS — M17.12 PRIMARY OSTEOARTHRITIS OF LEFT KNEE: ICD-10-CM

## 2024-03-21 PROCEDURE — 97110 THERAPEUTIC EXERCISES: CPT | Mod: GP | Performed by: PHYSICAL THERAPIST

## 2024-03-21 PROCEDURE — 97161 PT EVAL LOW COMPLEX 20 MIN: CPT | Mod: GP | Performed by: PHYSICAL THERAPIST

## 2024-03-21 ASSESSMENT — ACTIVITIES OF DAILY LIVING (ADL)
WALK: ACTIVITY IS SOMEWHAT DIFFICULT
LIMPING: THE SYMPTOM AFFECTS MY ACTIVITY MODERATELY
SIT WITH YOUR KNEE BENT: ACTIVITY IS NOT DIFFICULT
WEAKNESS: I HAVE THE SYMPTOM BUT IT DOES NOT AFFECT MY ACTIVITY
KNEE_ACTIVITY_OF_DAILY_LIVING_SUM: 46
GO UP STAIRS: ACTIVITY IS SOMEWHAT DIFFICULT
SQUAT: ACTIVITY IS SOMEWHAT DIFFICULT
GIVING WAY, BUCKLING OR SHIFTING OF KNEE: I HAVE THE SYMPTOM BUT IT DOES NOT AFFECT MY ACTIVITY
STAND: ACTIVITY IS MINIMALLY DIFFICULT
GO DOWN STAIRS: ACTIVITY IS SOMEWHAT DIFFICULT
HOW_WOULD_YOU_RATE_THE_OVERALL_FUNCTION_OF_YOUR_KNEE_DURING_YOUR_USUAL_DAILY_ACTIVITIES?: NEARLY NORMAL
AS_A_RESULT_OF_YOUR_KNEE_INJURY,_HOW_WOULD_YOU_RATE_YOUR_CURRENT_LEVEL_OF_DAILY_ACTIVITY?: NEARLY NORMAL
SWELLING: THE SYMPTOM AFFECTS MY ACTIVITY SLIGHTLY
RAW_SCORE: 46
KNEEL ON THE FRONT OF YOUR KNEE: ACTIVITY IS VERY DIFFICULT
STIFFNESS: I DO NOT HAVE THE SYMPTOM
KNEE_ACTIVITY_OF_DAILY_LIVING_SCORE: 65.71
RISE FROM A CHAIR: ACTIVITY IS MINIMALLY DIFFICULT
PAIN: THE SYMPTOM AFFECTS MY ACTIVITY MODERATELY

## 2024-03-21 NOTE — TELEPHONE ENCOUNTER
Pt trialed  brace for L knee.  He had a good response with pain relief and improved tolerance to squatting and stairs.    Marty Quinones,PT, DPT, OCS

## 2024-03-21 NOTE — PROGRESS NOTES
PHYSICAL THERAPY EVALUATION  Type of Visit: Evaluation    See electronic medical record for Abuse and Falls Screening details.    Subjective       Presenting condition or subjective complaint:    Date of onset: 03/05/24    Relevant medical history:     Dates & types of surgery:      Prior diagnostic imaging/testing results:       Prior therapy history for the same diagnosis, illness or injury:        Prior Level of Function  Transfers: Independent  Ambulation: Independent  ADL: Independent  IADL:     Living Environment  Social support:     Type of home:     Stairs to enter the home:         Ramp:     Stairs inside the home:         Help at home:    Equipment owned:       Employment:      Hobbies/Interests:      Patient goals for therapy:      He has been dealing with L knee pain for the past year.  He is limited by to walking 1 mile.  His knee feels better in the morning vs the evening.  Shemar time pattern with stairs in the evening.  Avoids deep squatting and getting down to the floor.    Pain assessment: Pain present     Objective   KNEE EVALUATION  PAIN: Pain Level at Rest: 0/10  Pain Level with Use: 8/10  Pain Location: knee  Pain Quality: Aching and Sharp  Pain Frequency: intermittent  Pain is Worst: nighttime  Pain is Exacerbated By: prolonged walking, stairs, squatting  Pain is Relieved By: NSAIDs and rest  Pain Progression: Unchanged  INTEGUMENTARY (edema, incisions):   POSTURE:   GAIT:  Weightbearing Status:   Assistive Device(s):   Gait Deviations:   BALANCE/PROPRIOCEPTION:   WEIGHTBEARING ALIGNMENT:   NON-WEIGHTBEARING ALIGNMENT:   ROM:   (Degrees) Left AROM Left PROM  Right AROM Right PROM   Supine heelslide 3-115  2-128    other       Pain:   End feel:     STRENGTH:   Pain: - none + mild ++ moderate +++ severe  Strength Scale: 0-5/5 Left Right   Knee Flexion 5 5   Knee Extension 5 5   Glute med  Glute max 4-  4      FLEXIBILITY:   SPECIAL TESTS:  + mcmurrays L medial knee  FUNCTIONAL TESTS: Single Leg  Squat: Anterior knee translation, Knee valgus, Hip internal rotation, Improper use of glutes/hips, and wt shifts to R leg  PALPATION:  mild tender L medial joint line (knee)  JOINT MOBILITY:     Ossur Knee  Brace Trial:     Pain Rating    Affected Joint: Left Knee    Without  brace:    At rest 0/10  Walkin/10  (walking in clinic)  Squatting: 3/10   Stairs: 5/10      Wearing  brace:    At rest 0/10  Walkin/10    Squattin/10   Stairs: 1/10      Brace and Measurements:    Brace trialed:     Type - Ossur  One  Size - Medium  Side - Medial  Affected Knee - Left Knee    Circumference 6 inches below mid patella: 15 inches (Ossur ) and Circumference 6 inches above mid patella: 16 inches (Ossur OA Ease Knee )    Other Comments:  See PT evaluation in Epic for any additional information including joint special testing/ligament testing         Assessment & Plan   CLINICAL IMPRESSIONS  Medical Diagnosis: OA L knee    Treatment Diagnosis: L medial knee pain   Impression/Assessment: Patient is a 68 year old male with L knee complaints consistent with medial joint OA.  The following significant findings have been identified: Pain, Decreased strength, Impaired gait, and Decreased activity tolerance. These impairments interfere with their ability to perform work tasks, recreational activities, household chores, driving , household mobility, and community mobility as compared to previous level of function.     Clinical Decision Making (Complexity):  Clinical Presentation: Stable/Uncomplicated  Clinical Presentation Rationale: based on medical and personal factors listed in PT evaluation  Clinical Decision Making (Complexity): Low complexity    PLAN OF CARE  Treatment Interventions:  Interventions: Manual Therapy, Neuromuscular Re-education, Therapeutic Activity, Therapeutic Exercise    Long Term Goals     PT Goal 1  Goal Identifier: stairs  Goal Description: Pt will be able  to perform reciprocal pattern on stairs with hand rail, 2 flights, pain 3/10  Rationale: to maximize safety and independence with performance of ADLs and functional tasks;to maximize safety and independence within the community;to maximize safety and independence within the home  Target Date: 04/18/24  PT Goal 2  Goal Identifier: stairs  Goal Description: Pt will be able to perform reciprcal pattern on stairs, 4 flights, pain 3/10  Rationale: to maximize safety and independence with performance of ADLs and functional tasks;to maximize safety and independence within the home;to maximize safety and independence within the community  Target Date: 05/16/24      Frequency of Treatment: 1x/2 weeks  Duration of Treatment: 8 weeks    Recommended Referrals to Other Professionals:  O and P for  brace  Education Assessment:   Learner/Method: Patient;No Barriers to Learning    Risks and benefits of evaluation/treatment have been explained.   Patient/Family/caregiver agrees with Plan of Care.     Evaluation Time:     PT Eval, Low Complexity Minutes (21968): 20       Signing Clinician: Marty Quinones PT

## 2024-03-21 NOTE — TELEPHONE ENCOUNTER
Pt responded well with  brace for L knee. Less pain and decreased compensation with stairs and squatting.     Marty Quinones,PT, DPT, OCS

## 2024-04-18 ENCOUNTER — TRANSFERRED RECORDS (OUTPATIENT)
Dept: HEALTH INFORMATION MANAGEMENT | Facility: CLINIC | Age: 69
End: 2024-04-18

## 2024-05-07 PROBLEM — G89.29 CHRONIC PAIN OF LEFT KNEE: Status: RESOLVED | Noted: 2024-03-21 | Resolved: 2024-05-07

## 2024-05-07 PROBLEM — M25.562 CHRONIC PAIN OF LEFT KNEE: Status: RESOLVED | Noted: 2024-03-21 | Resolved: 2024-05-07

## 2024-05-07 NOTE — PROGRESS NOTES
Patient seen for one time evaluation and treatment.  Patient did not return for further treatment and current status is unknown.  Please see initial evaluation for further information.  Marty Quinones,PT, DPT, OCS

## 2024-06-18 ENCOUNTER — E-VISIT (OUTPATIENT)
Dept: URGENT CARE | Facility: CLINIC | Age: 69
End: 2024-06-18
Payer: COMMERCIAL

## 2024-06-18 DIAGNOSIS — R30.0 DYSURIA: Primary | ICD-10-CM

## 2024-06-18 PROCEDURE — 99421 OL DIG E/M SVC 5-10 MIN: CPT | Performed by: PREVENTIVE MEDICINE

## 2024-06-18 NOTE — PATIENT INSTRUCTIONS
Dear Krishna Dugan,     After reviewing your responses, I would like you to come in for a urine test to make sure we treat you correctly. This urine test is to evaluate you for a possible urinary tract infection, and should be scheduled for today or tomorrow. Schedule a Lab Only appointment here.     Lab appointments are not available at most locations on the weekends. If no Lab Only appointment is available, you should be seen in any of our convenient Walk-in or Urgent Care Centers, which can be found on our website here.     You will receive instructions with your results in [facility] once they are available.     If your symptoms worsen, you develop pain in your back or stomach, develop fevers, or are not improving in 5 days, please contact your primary care provider for an appointment or visit a Walk-in or Urgent Care Center to be seen.     Thanks again for choosing us as your health care partner,     Osei Beauchamp MD, MD

## 2024-06-19 ENCOUNTER — LAB (OUTPATIENT)
Dept: LAB | Facility: CLINIC | Age: 69
End: 2024-06-19
Payer: COMMERCIAL

## 2024-06-19 DIAGNOSIS — R30.0 DYSURIA: ICD-10-CM

## 2024-06-19 LAB
ALBUMIN UR-MCNC: 30 MG/DL
APPEARANCE UR: CLEAR
BACTERIA #/AREA URNS HPF: ABNORMAL /HPF
BILIRUB UR QL STRIP: NEGATIVE
COLOR UR AUTO: YELLOW
GLUCOSE UR STRIP-MCNC: NEGATIVE MG/DL
HGB UR QL STRIP: NEGATIVE
HYALINE CASTS #/AREA URNS LPF: ABNORMAL /LPF
KETONES UR STRIP-MCNC: NEGATIVE MG/DL
LEUKOCYTE ESTERASE UR QL STRIP: NEGATIVE
MUCOUS THREADS #/AREA URNS LPF: PRESENT /LPF
NITRATE UR QL: NEGATIVE
PH UR STRIP: 5.5 [PH] (ref 5–7)
RBC #/AREA URNS AUTO: ABNORMAL /HPF
SP GR UR STRIP: >=1.03 (ref 1–1.03)
UROBILINOGEN UR STRIP-ACNC: 0.2 E.U./DL
WBC #/AREA URNS AUTO: ABNORMAL /HPF

## 2024-06-19 PROCEDURE — 81001 URINALYSIS AUTO W/SCOPE: CPT

## 2024-07-15 ENCOUNTER — OFFICE VISIT (OUTPATIENT)
Dept: FAMILY MEDICINE | Facility: CLINIC | Age: 69
End: 2024-07-15
Payer: COMMERCIAL

## 2024-07-15 VITALS
RESPIRATION RATE: 18 BRPM | HEIGHT: 69 IN | WEIGHT: 201.4 LBS | OXYGEN SATURATION: 98 % | HEART RATE: 98 BPM | BODY MASS INDEX: 29.83 KG/M2 | SYSTOLIC BLOOD PRESSURE: 130 MMHG | TEMPERATURE: 98.5 F | DIASTOLIC BLOOD PRESSURE: 80 MMHG

## 2024-07-15 DIAGNOSIS — K40.90 RIGHT INGUINAL HERNIA: Primary | ICD-10-CM

## 2024-07-15 PROCEDURE — 99214 OFFICE O/P EST MOD 30 MIN: CPT | Performed by: PHYSICIAN ASSISTANT

## 2024-07-15 ASSESSMENT — PAIN SCALES - GENERAL: PAINLEVEL: MILD PAIN (2)

## 2024-07-15 NOTE — PROGRESS NOTES
Assessment & Plan   Problem List Items Addressed This Visit    None  Visit Diagnoses       Right inguinal hernia    -  Primary    Relevant Orders    Adult Gen Surg  Referral             Left sided inguinal hernia is reducible and non-tender. Low suspicion for strangulation or obstruction. Will refer to surgery for eval and likely elective repair. Red flag symptoms reviewed.    Complete history and physical exam as below. Afebrile with normal vital signs.    DDx and Dx discussed with and explained to the pt to their satisfaction.  All questions were answered at this time. Pt expressed understanding of and agreement with this dx, tx, and plan. No further workup warranted. I have given the patient a list of pertinent indications for re-evaluation. Will go to the Emergency Department if symptoms worsen or new concerning symptoms arise. Patient left in no apparent distress.      See Patient Instructions      Anibal Kennedy is a 69 year old, presenting for the following health issues:  Penis/Scrotum Problem        7/15/2024     2:50 PM   Additional Questions   Roomed by Red Bello CMA   Accompanied by N/A         7/15/2024     2:50 PM   Patient Reported Additional Medications   Patient reports taking the following new medications No new medications     History of Present Illness       Reason for visit:  Groin pain    He eats 0-1 servings of fruits and vegetables daily.He consumes 1 sweetened beverage(s) daily.He exercises with enough effort to increase his heart rate 10 to 19 minutes per day.    He is taking medications regularly.     Patient is coming in today with groin pain that has been ongoing for one month.  He states it is the upper left and he was told previously a while back that there was possibly a hernia in that area.  Coughing causes a sharp pain and he recently has had it spread.  There is no bleeding , no pus, no redness in area.  No urinary symptoms or abdominal pain noted.  UA was done  "about a month ago which showed no big issues.    31 minutes spent by me on the date of the encounter doing chart review, history and exam, documentation and further activities per the note  Review of Systems  Constitutional, HEENT, cardiovascular, pulmonary, gi and gu systems are negative, except as otherwise noted.      Objective    /80   Pulse 98   Temp 98.5  F (36.9  C) (Temporal)   Resp 18   Ht 1.745 m (5' 8.7\")   Wt 91.4 kg (201 lb 6.4 oz)   SpO2 98%   BMI 30.00 kg/m    Body mass index is 30 kg/m .  Physical Exam  Vitals and nursing note reviewed.   Constitutional:       General: He is not in acute distress.     Appearance: Normal appearance. He is not diaphoretic.   HENT:      Head: Normocephalic and atraumatic.      Nose: Nose normal.   Eyes:      Conjunctiva/sclera: Conjunctivae normal.   Pulmonary:      Effort: Pulmonary effort is normal. No respiratory distress.   Genitourinary:     Comments: Circumcised.  No blood or discharge at the urethral meatus.  Scrotum and contents nontender.  Left-sided inguinal hernia was reducible and nontender.  No sores or rashes.  Skin:     General: Skin is dry.      Coloration: Skin is not jaundiced or pale.   Neurological:      General: No focal deficit present.      Mental Status: He is alert. Mental status is at baseline.   Psychiatric:         Mood and Affect: Mood normal.         Behavior: Behavior normal.                Signed Electronically by: PITA George    "

## 2024-07-15 NOTE — PATIENT INSTRUCTIONS
Will Kennedy,    Thank you for allowing [facility] to manage your care.    This is likely an inguinal hernia and needs repair.    If you develop worsening/changing symptoms at any time such as worsening pain, vomiting, fevers, urinary symptoms, etc., please call 911/go to the emergency department for evaluation as we discussed.    I made a referral to general surgery. They will be calling in approximately 1 week to set up your appointment.  If you do not hear from them, please call the specialty number on your after visit summary.     If you have any questions or concerns, please feel free to call us at [phone]    Sincerely,    David Castellon PA-C    Did you know?      You can schedule a video visit for follow-up appointments as well as future appointments for certain conditions.  Please see the below link.     [email or web address]    If you have not already done so,  I encourage you to sign up for [email or web address] ([email or web address]).  This will allow you to review your results, securely communicate with a provider, and schedule virtual visits as well.

## 2024-08-04 DIAGNOSIS — K44.9 HIATAL HERNIA: ICD-10-CM

## 2024-08-05 RX ORDER — OMEPRAZOLE 40 MG/1
CAPSULE, DELAYED RELEASE ORAL
Qty: 90 CAPSULE | Refills: 0 | Status: SHIPPED | OUTPATIENT
Start: 2024-08-05

## 2024-08-07 DIAGNOSIS — I10 BENIGN ESSENTIAL HYPERTENSION: ICD-10-CM

## 2024-08-07 RX ORDER — LISINOPRIL 30 MG/1
30 TABLET ORAL DAILY
Qty: 90 TABLET | Refills: 0 | Status: SHIPPED | OUTPATIENT
Start: 2024-08-07 | End: 2024-09-03

## 2024-08-08 ENCOUNTER — OFFICE VISIT (OUTPATIENT)
Dept: SURGERY | Facility: OTHER | Age: 69
End: 2024-08-08
Attending: PHYSICIAN ASSISTANT
Payer: COMMERCIAL

## 2024-08-08 VITALS
BODY MASS INDEX: 29.96 KG/M2 | OXYGEN SATURATION: 99 % | TEMPERATURE: 96.8 F | SYSTOLIC BLOOD PRESSURE: 126 MMHG | HEART RATE: 87 BPM | HEIGHT: 69 IN | DIASTOLIC BLOOD PRESSURE: 68 MMHG | WEIGHT: 202.3 LBS

## 2024-08-08 DIAGNOSIS — K40.20 NON-RECURRENT BILATERAL INGUINAL HERNIA WITHOUT OBSTRUCTION OR GANGRENE: Primary | ICD-10-CM

## 2024-08-08 PROCEDURE — 99244 OFF/OP CNSLTJ NEW/EST MOD 40: CPT | Performed by: SURGERY

## 2024-08-08 ASSESSMENT — PAIN SCALES - GENERAL: PAINLEVEL: NO PAIN (0)

## 2024-08-08 NOTE — PROGRESS NOTES
Patient seen in consultation for left inguinal hernia by Fay Cuellar    HPI:  Patient is a 69 year old male with several year history of known left inguinal hernia but elected not to have it repaired at the time of diagnosis.  He has never had abdominal surgery.  He is not a smoker and not a diabetic.  Weight overall stable.  He endorses discomfort with exertion in bilateral groins.  He had a CT scan in 2019 for unrelated reason which showed bilateral inguinal hernias at that time.  He denies obstructive symptoms.    Review Of Systems    Skin: negative  Ears/Nose/Throat: negative  Respiratory: No shortness of breath, dyspnea on exertion, cough, or hemoptysis  Cardiovascular: negative  Gastrointestinal: negative  Genitourinary: negative  Musculoskeletal: negative  Neurologic: negative  Hematologic/Lymphatic/Immunologic: negative  Endocrine: negative      Past Medical History:   Diagnosis Date    Diagnostic skin and sensitization tests 9/08 skin tests pos. for cat/dog ONLY (all other environmental allergens NEGATIVE)       Past Surgical History:   Procedure Laterality Date    ESOPHAGOSCOPY, GASTROSCOPY, DUODENOSCOPY (EGD), COMBINED N/A 3/14/2023    Procedure: ESOPHAGOGASTRODUODENOSCOPY (EGD);  Surgeon: Sanchez Franco MD;  Location:  GI       No family history on file.    Social History     Socioeconomic History    Marital status:      Spouse name: Not on file    Number of children: Not on file    Years of education: Not on file    Highest education level: Not on file   Occupational History    Not on file   Tobacco Use    Smoking status: Former     Current packs/day: 0.50     Average packs/day: 0.5 packs/day for 10.0 years (5.0 ttl pk-yrs)     Types: Cigarettes    Smokeless tobacco: Never   Vaping Use    Vaping status: Never Used   Substance and Sexual Activity    Alcohol use: No     Comment: very little    Drug use: No    Sexual activity: Not Currently     Partners: Female   Other Topics Concern  "   Parent/sibling w/ CABG, MI or angioplasty before 65F 55M? Yes     Comment: dad at 53, brother 51   Social History Narrative    Not on file     Social Determinants of Health     Financial Resource Strain: Not on file   Food Insecurity: Not on file   Transportation Needs: Not on file   Physical Activity: Sufficiently Active (3/5/2024)    Exercise Vital Sign     Days of Exercise per Week: 3 days     Minutes of Exercise per Session: 90 min   Stress: Not on file   Social Connections: Not on file   Interpersonal Safety: Low Risk  (7/15/2024)    Interpersonal Safety     Do you feel physically and emotionally safe where you currently live?: Yes     Within the past 12 months, have you been hit, slapped, kicked or otherwise physically hurt by someone?: No     Within the past 12 months, have you been humiliated or emotionally abused in other ways by your partner or ex-partner?: No   Housing Stability: Not on file       Current Outpatient Medications   Medication Sig Dispense Refill    lisinopril (ZESTRIL) 30 MG tablet TAKE 1 TABLET(30 MG) BY MOUTH DAILY 90 tablet 0    omeprazole (PRILOSEC) 40 MG DR capsule TAKE ONE CAPSULE BY MOUTH DAILY WITH DINNER 90 capsule 0    VITAMIN D, CHOLECALCIFEROL, PO Take 5,000 Units by mouth daily         Medications and history reviewed    Physical exam:  Vitals: /68   Pulse 87   Temp 96.8  F (36  C) (Temporal)   Ht 1.745 m (5' 8.7\")   Wt 91.8 kg (202 lb 4.8 oz)   SpO2 99%   BMI 30.14 kg/m    BMI= Body mass index is 30.14 kg/m .    Constitutional: alert, non-distressed   Head: normo-cephalic, atraumatic   Cardiovascular: RRR  Respiratory: equal chest rise, good respiratory effort, no audible wheeze  Gastrointestinal: Soft, non-tender, non distended, no masses or hernias palpated  Groin: Bilateral inguinal hernias soft and reducible; left greater than right.  : deferred  Musculoskeletal: moves all extremities   Skin: no suspicious lesions or rashes   Psychiatric: mentation appears " normal, affect appropriate   Patient able to get up on table without difficulty.    Labs show:  No results found for this or any previous visit (from the past 24 hour(s)).    Imaging shows:  No results found for this or any previous visit (from the past 744 hour(s)).     Assessment:     ICD-10-CM    1. Non-recurrent bilateral inguinal hernia without obstruction or gangrene  K40.20 Case Request: REPAIR, HERNIA, INGUINAL, BILATERAL, ROBOT-ASSISTED, LAPAROSCOPIC, USING DA JOSE MIGUEL XI     Case Request: REPAIR, HERNIA, INGUINAL, BILATERAL, ROBOT-ASSISTED, LAPAROSCOPIC, USING DA JOSE MIGUEL XI        Plan: I recommend robot-assisted laparoscopic bilateral inguinal hernia repair with mesh. We discussed the procedure in detail. We also discussed the risks, benefits, alternatives and post-op care and restrictions. After our informed discussion we decided to proceed with the proposed surgery.    Risks of surgery discussed including, but not limited to bleeding, infection, recurrence, damage to nerves and what is in the hernia sac.  Risks of anesthesia also discussed..  Although mesh is a better long term repair if it gets infected it must be removed.  If there is evidence of an infection at time of surgery it will be cancelled and rescheduled for when infection has resolved.     Discussed massaging hernia back in and using ice if becomes more painful.  If not able to reduce then go to emergency room.    45 minutes spent by me on the date of the encounter doing chart review, history and exam, documentation and further activities per the note    Clayton Gates,

## 2024-08-08 NOTE — LETTER
8/8/2024      Krishna Dugan  [id number] [address] [address]      Dear Colleague,    Thank you for referring your patient, Krishna Dugan, to the [facility]. Please see a copy of my visit note below.    Patient seen in consultation for left inguinal hernia by Fay Cuellar    HPI:  Patient is a 69 year old male with several year history of known left inguinal hernia but elected not to have it repaired at the time of diagnosis.  He has never had abdominal surgery.  He is not a smoker and not a diabetic.  Weight overall stable.  He endorses discomfort with exertion in bilateral groins.  He had a CT scan in 2019 for unrelated reason which showed bilateral inguinal hernias at that time.  He denies obstructive symptoms.    Review Of Systems    Skin: negative  Ears/Nose/Throat: negative  Respiratory: No shortness of breath, dyspnea on exertion, cough, or hemoptysis  Cardiovascular: negative  Gastrointestinal: negative  Genitourinary: negative  Musculoskeletal: negative  Neurologic: negative  Hematologic/Lymphatic/Immunologic: negative  Endocrine: negative      Past Medical History:   Diagnosis Date     Diagnostic skin and sensitization tests 9/08 skin tests pos. for cat/dog ONLY (all other environmental allergens NEGATIVE)       Past Surgical History:   Procedure Laterality Date     ESOPHAGOSCOPY, GASTROSCOPY, DUODENOSCOPY (EGD), COMBINED N/A 3/14/2023    Procedure: ESOPHAGOGASTRODUODENOSCOPY (EGD);  Surgeon: Sanchez Franco MD;  Location:  GI       No family history on file.    Social History     Socioeconomic History     Marital status:      Spouse name: Not on file     Number of children: Not on file     Years of education: Not on file     Highest education level: Not on file   Occupational History     Not on file   Tobacco Use     Smoking status: Former     Current packs/day: 0.50     Average packs/day: 0.5 packs/day for 10.0 years (5.0 ttl pk-yrs)      "Types: Cigarettes     Smokeless tobacco: Never   Vaping Use     Vaping status: Never Used   Substance and Sexual Activity     Alcohol use: No     Comment: very little     Drug use: No     Sexual activity: Not Currently     Partners: Female   Other Topics Concern     Parent/sibling w/ CABG, MI or angioplasty before 65F 55M? Yes     Comment: dad at 53, brother 51   Social History Narrative     Not on file     Social Determinants of Health     Financial Resource Strain: Not on file   Food Insecurity: Not on file   Transportation Needs: Not on file   Physical Activity: Sufficiently Active (3/5/2024)    Exercise Vital Sign      Days of Exercise per Week: 3 days      Minutes of Exercise per Session: 90 min   Stress: Not on file   Social Connections: Not on file   Interpersonal Safety: Low Risk  (7/15/2024)    Interpersonal Safety      Do you feel physically and emotionally safe where you currently live?: Yes      Within the past 12 months, have you been hit, slapped, kicked or otherwise physically hurt by someone?: No      Within the past 12 months, have you been humiliated or emotionally abused in other ways by your partner or ex-partner?: No   Housing Stability: Not on file       Current Outpatient Medications   Medication Sig Dispense Refill     lisinopril (ZESTRIL) 30 MG tablet TAKE 1 TABLET(30 MG) BY MOUTH DAILY 90 tablet 0     omeprazole (PRILOSEC) 40 MG DR capsule TAKE ONE CAPSULE BY MOUTH DAILY WITH DINNER 90 capsule 0     VITAMIN D, CHOLECALCIFEROL, PO Take 5,000 Units by mouth daily         Medications and history reviewed    Physical exam:  Vitals: /68   Pulse 87   Temp 96.8  F (36  C) (Temporal)   Ht 1.745 m (5' 8.7\")   Wt 91.8 kg (202 lb 4.8 oz)   SpO2 99%   BMI 30.14 kg/m    BMI= Body mass index is 30.14 kg/m .    Constitutional: alert, non-distressed   Head: normo-cephalic, atraumatic   Cardiovascular: RRR  Respiratory: equal chest rise, good respiratory effort, no audible " wheeze  Gastrointestinal: Soft, non-tender, non distended, no masses or hernias palpated  Groin: Bilateral inguinal hernias soft and reducible; left greater than right.  : deferred  Musculoskeletal: moves all extremities   Skin: no suspicious lesions or rashes   Psychiatric: mentation appears normal, affect appropriate   Patient able to get up on table without difficulty.    Labs show:  No results found for this or any previous visit (from the past 24 hour(s)).    Imaging shows:  No results found for this or any previous visit (from the past 744 hour(s)).     Assessment:     ICD-10-CM    1. Non-recurrent bilateral inguinal hernia without obstruction or gangrene  K40.20 Case Request: REPAIR, HERNIA, INGUINAL, BILATERAL, ROBOT-ASSISTED, LAPAROSCOPIC, USING DA JOSE MIGUEL XI     Case Request: REPAIR, HERNIA, INGUINAL, BILATERAL, ROBOT-ASSISTED, LAPAROSCOPIC, USING DA JOSE MIGUEL XI        Plan: I recommend robot-assisted laparoscopic bilateral inguinal hernia repair with mesh. We discussed the procedure in detail. We also discussed the risks, benefits, alternatives and post-op care and restrictions. After our informed discussion we decided to proceed with the proposed surgery.    Risks of surgery discussed including, but not limited to bleeding, infection, recurrence, damage to nerves and what is in the hernia sac.  Risks of anesthesia also discussed..  Although mesh is a better long term repair if it gets infected it must be removed.  If there is evidence of an infection at time of surgery it will be cancelled and rescheduled for when infection has resolved.     Discussed massaging hernia back in and using ice if becomes more painful.  If not able to reduce then go to emergency room.    45 minutes spent by me on the date of the encounter doing chart review, history and exam, documentation and further activities per the note    Clayton Gates, DO      Again, thank you for allowing me to participate in the care of your  patient.        Sincerely,        Clayton Gates, DO

## 2024-08-12 ENCOUNTER — TELEPHONE (OUTPATIENT)
Dept: SURGERY | Facility: CLINIC | Age: 69
End: 2024-08-12
Payer: COMMERCIAL

## 2024-08-12 NOTE — TELEPHONE ENCOUNTER
Type of surgery: REPAIR, HERNIA, INGUINAL, BILATERAL, ROBOT-ASSISTED, LAPAROSCOPIC, USING DA JOSE MIGUEL XI   Location of surgery: [facility] OR  Date and time of surgery: 9/6  Surgeon: Yajaira  Pre-Op Appt Date: 9/3  Post-Op Appt Date: 9/12   Packet sent out: Yes  Pre-cert/Authorization completed:  Not Applicable  Date: na

## 2024-09-03 ENCOUNTER — OFFICE VISIT (OUTPATIENT)
Dept: FAMILY MEDICINE | Facility: CLINIC | Age: 69
End: 2024-09-03
Payer: COMMERCIAL

## 2024-09-03 VITALS
BODY MASS INDEX: 30.07 KG/M2 | RESPIRATION RATE: 18 BRPM | TEMPERATURE: 97.8 F | HEIGHT: 69 IN | WEIGHT: 203 LBS | SYSTOLIC BLOOD PRESSURE: 138 MMHG | OXYGEN SATURATION: 98 % | HEART RATE: 82 BPM | DIASTOLIC BLOOD PRESSURE: 77 MMHG

## 2024-09-03 DIAGNOSIS — I10 BENIGN ESSENTIAL HYPERTENSION: ICD-10-CM

## 2024-09-03 DIAGNOSIS — Z01.818 PREOP GENERAL PHYSICAL EXAM: Primary | ICD-10-CM

## 2024-09-03 DIAGNOSIS — Z82.49 FAMILY HISTORY OF ISCHEMIC HEART DISEASE: ICD-10-CM

## 2024-09-03 DIAGNOSIS — Z00.00 ROUTINE GENERAL MEDICAL EXAMINATION AT A HEALTH CARE FACILITY: ICD-10-CM

## 2024-09-03 DIAGNOSIS — E78.5 HYPERLIPIDEMIA, UNSPECIFIED HYPERLIPIDEMIA TYPE: ICD-10-CM

## 2024-09-03 DIAGNOSIS — K40.20 BILATERAL INGUINAL HERNIA WITHOUT OBSTRUCTION OR GANGRENE, RECURRENCE NOT SPECIFIED: ICD-10-CM

## 2024-09-03 LAB
ANION GAP SERPL CALCULATED.3IONS-SCNC: 12 MMOL/L (ref 7–15)
BUN SERPL-MCNC: 14 MG/DL (ref 8–23)
CALCIUM SERPL-MCNC: 9.9 MG/DL (ref 8.8–10.4)
CHLORIDE SERPL-SCNC: 101 MMOL/L (ref 98–107)
CHOLEST SERPL-MCNC: 274 MG/DL
CREAT SERPL-MCNC: 1.08 MG/DL (ref 0.67–1.17)
EGFRCR SERPLBLD CKD-EPI 2021: 74 ML/MIN/1.73M2
FASTING STATUS PATIENT QL REPORTED: YES
FASTING STATUS PATIENT QL REPORTED: YES
GLUCOSE SERPL-MCNC: 113 MG/DL (ref 70–99)
HCO3 SERPL-SCNC: 25 MMOL/L (ref 22–29)
HDLC SERPL-MCNC: 32 MG/DL
LDLC SERPL CALC-MCNC: 182 MG/DL
NONHDLC SERPL-MCNC: 242 MG/DL
POTASSIUM SERPL-SCNC: 5.1 MMOL/L (ref 3.4–5.3)
SODIUM SERPL-SCNC: 138 MMOL/L (ref 135–145)
TRIGL SERPL-MCNC: 299 MG/DL

## 2024-09-03 PROCEDURE — 99214 OFFICE O/P EST MOD 30 MIN: CPT | Mod: 25 | Performed by: PHYSICIAN ASSISTANT

## 2024-09-03 PROCEDURE — 80048 BASIC METABOLIC PNL TOTAL CA: CPT | Performed by: PHYSICIAN ASSISTANT

## 2024-09-03 PROCEDURE — 80061 LIPID PANEL: CPT | Performed by: PHYSICIAN ASSISTANT

## 2024-09-03 PROCEDURE — 99397 PER PM REEVAL EST PAT 65+ YR: CPT | Performed by: PHYSICIAN ASSISTANT

## 2024-09-03 PROCEDURE — 36415 COLL VENOUS BLD VENIPUNCTURE: CPT | Performed by: PHYSICIAN ASSISTANT

## 2024-09-03 RX ORDER — LISINOPRIL 30 MG/1
30 TABLET ORAL DAILY
Qty: 90 TABLET | Refills: 1 | Status: SHIPPED | OUTPATIENT
Start: 2024-09-03

## 2024-09-03 NOTE — PROGRESS NOTES
Preoperative Evaluation  [facility]  [address] [place] [address]  [place] [address]  Phone: [phone]  Primary Provider: Fay Cuellar PA-C  Pre-op Performing Provider: Fay Cuellar PA-C  Sep 3, 2024             9/3/2024   Surgical Information   What procedure is being done? double hernia,hernia inguinal    Facility or Hospital where procedure/surgery will be performed: [facility]   Who is doing the procedure / surgery? Dr. Yajaira Arnold    Date of surgery / procedure: 9-6-24   Time of surgery / procedure: 1:30 pm   Where do you plan to recover after surgery? at home with family        Fax number for surgical facility: Note does not need to be faxed, will be available electronically in Epic.    Assessment & Plan     The proposed surgical procedure is considered INTERMEDIATE risk.    Hyperlipidemia, unspecified hyperlipidemia type    - Lipid panel reflex to direct LDL Non-fasting; Future  - Lipid panel reflex to direct LDL Non-fasting    Preop general physical exam      Family history of ischemic heart disease    - REFERRAL TO [facility] FOR CARDIOVASCULAR DISEASE PREVN    Benign essential hypertension    - lisinopril (ZESTRIL) 30 MG tablet; Take 1 tablet (30 mg) by mouth daily.  - Basic metabolic panel  (Ca, Cl, CO2, Creat, Gluc, K, Na, BUN); Future  - Basic metabolic panel  (Ca, Cl, CO2, Creat, Gluc, K, Na, BUN)    Routine general medical examination at a health care facility      Bilateral inguinal hernia without obstruction or gangrene, recurrence not specified              - No identified additional risk factors other than previously addressed    Preoperative Medication Instructions  Antiplatelet or Anticoagulation Medication Instructions   - Patient is on no antiplatelet or anticoagulation medications.    Additional Medication Instructions   - ACE/ARB: DO NOT TAKE on day of surgery (minimum 11 hours for general anesthesia).    Recommendation  Approval given to  proceed with proposed procedure, without further diagnostic evaluation.    Anibal Kennedy is a 69 year old, presenting for the following:  Pre-Op Exam          9/3/2024    10:08 AM   Additional Questions   Roomed by Chelo   Accompanied by self     HPI related to upcoming procedure: hernia repair         9/3/2024   Pre-Op Questionnaire   Have you ever had a heart attack or stroke? No   Have you ever had surgery on your heart or blood vessels, such as a stent placement, a coronary artery bypass, or surgery on an artery in your head, neck, heart, or legs? No   Do you have chest pain with activity? No   Do you have a history of heart failure? NO   Do you currently have a cold, bronchitis or symptoms of other infection? No   Do you have a cough, shortness of breath, or wheezing? No   Do you or anyone in your family have previous history of blood clots? No   Do you or does anyone in your family have a serious bleeding problem such as prolonged bleeding following surgeries or cuts? No   Have you ever had problems with anemia or been told to take iron pills? No   Have you had any abnormal blood loss such as black, tarry or bloody stools? No   Have you ever had a blood transfusion? No   Are you willing to have a blood transfusion if it is medically needed before, during, or after your surgery? Yes   Have you or any of your relatives ever had problems with anesthesia? No   Do you have sleep apnea, excessive snoring or daytime drowsiness? No   Do you have any artifical heart valves or other implanted medical devices like a pacemaker, defibrillator, or continuous glucose monitor? No   Do you have artificial joints? No   Are you allergic to latex? No        Health Care Directive  Patient does not have a Health Care Directive or Living Will: Discussed advance care planning with patient; information given to patient to review.    Preoperative Review of    reviewed - no record of controlled substances  prescribed.          Patient Active Problem List    Diagnosis Date Noted    Prediabetes 12/07/2022     Priority: Medium    S/P rotator cuff repair 06/01/2021     Priority: Medium    Essential tremor 03/15/2021     Priority: Medium    Sexual dysfunction 09/02/2020     Priority: Medium    Other testicular hypofunction 03/15/2019     Priority: Medium    Vitamin D deficiency 03/15/2019     Priority: Medium    Allergic rhinitis due to animal dander 03/06/2018     Priority: Medium    Allergic rhinitis due to dust mite 03/06/2018     Priority: Medium    Essential hypertension, benign 02/05/2018     Priority: Medium    Hyperlipidemia 02/05/2018     Priority: Medium     Overview:   HYPERLIPIDEMIA NEC/NOS      S/P colonoscopic polypectomy 02/05/2018     Priority: Medium    Gastroesophageal reflux disease without esophagitis 12/09/2015     Priority: Medium      Past Medical History:   Diagnosis Date    Diagnostic skin and sensitization tests 9/08 skin tests pos. for cat/dog ONLY (all other environmental allergens NEGATIVE)     Past Surgical History:   Procedure Laterality Date    ESOPHAGOSCOPY, GASTROSCOPY, DUODENOSCOPY (EGD), COMBINED N/A 3/14/2023    Procedure: ESOPHAGOGASTRODUODENOSCOPY (EGD);  Surgeon: Sanchez Franco MD;  Location:  GI     Current Outpatient Medications   Medication Sig Dispense Refill    lisinopril (ZESTRIL) 30 MG tablet TAKE 1 TABLET(30 MG) BY MOUTH DAILY 90 tablet 0    omeprazole (PRILOSEC) 40 MG DR capsule TAKE ONE CAPSULE BY MOUTH DAILY WITH DINNER 90 capsule 0    VITAMIN D, CHOLECALCIFEROL, PO Take 5,000 Units by mouth daily         Allergies   Allergen Reactions    Statins [Statins] Muscle Pain (Myalgia)    Valdecoxib      Ears turn purple  Facial flushing  Bextra        Social History     Tobacco Use    Smoking status: Former     Current packs/day: 0.50     Average packs/day: 0.5 packs/day for 10.0 years (5.0 ttl pk-yrs)     Types: Cigarettes     Passive exposure: Past    Smokeless tobacco:  "Never   Substance Use Topics    Alcohol use: No     Comment: very little     Family History   Problem Relation Age of Onset    Coronary Artery Disease Father 53    Coronary Artery Disease Brother     Coronary Artery Disease Brother     Coronary Artery Disease Brother      History   Drug Use No             Review of Systems  CONSTITUTIONAL: NEGATIVE for fever, chills, change in weight  INTEGUMENTARY/SKIN: rash on thumb   ENT/MOUTH: NEGATIVE for ear, mouth and throat problems  RESP: NEGATIVE for significant cough or SOB  CV: NEGATIVE for chest pain, palpitations or peripheral edema  GI: NEGATIVE for nausea, abdominal pain, heartburn, or change in bowel habits  : NEGATIVE for frequency, dysuria, or hematuria  MUSCULOSKELETAL:knee pain   NEURO: no headaches   ENDOCRINE: NEGATIVE for temperature intolerance, skin/hair changes  HEME: NEGATIVE for bleeding problems  PSYCHIATRIC: NEGATIVE for changes in mood or affect    Objective    /77   Pulse 82   Temp 97.8  F (36.6  C) (Temporal)   Resp 18   Ht 1.745 m (5' 8.7\")   Wt 92.1 kg (203 lb)   SpO2 98%   BMI 30.24 kg/m     Estimated body mass index is 30.24 kg/m  as calculated from the following:    Height as of this encounter: 1.745 m (5' 8.7\").    Weight as of this encounter: 92.1 kg (203 lb).  Physical Exam  Constitutional:       General: He is not in acute distress.     Appearance: He is well-developed.   HENT:      Right Ear: Tympanic membrane, ear canal and external ear normal.      Left Ear: Tympanic membrane, ear canal and external ear normal.      Mouth/Throat:      Mouth: No oral lesions.      Pharynx: No oropharyngeal exudate.   Eyes:      Conjunctiva/sclera: Conjunctivae normal.   Neck:      Thyroid: No thyromegaly.      Trachea: No tracheal deviation.   Cardiovascular:      Rate and Rhythm: Normal rate and regular rhythm.      Heart sounds: Normal heart sounds, S1 normal and S2 normal. No murmur heard.     No S3 or S4 sounds.   Pulmonary:      " "Effort: Pulmonary effort is normal. No respiratory distress.      Breath sounds: Normal breath sounds. No wheezing or rales.   Abdominal:      General: Bowel sounds are normal.      Palpations: Abdomen is soft. There is no mass.      Tenderness: There is no abdominal tenderness.   Musculoskeletal:         General: No deformity. Normal range of motion.      Cervical back: Neck supple.   Lymphadenopathy:      Cervical: No cervical adenopathy.   Skin:     General: Skin is warm and dry.      Findings: No lesion or rash.   Neurological:      Mental Status: He is alert and oriented to person, place, and time.      Motor: No abnormal muscle tone.      Deep Tendon Reflexes: Reflexes are normal and symmetric.   Psychiatric:         Speech: Speech normal.         Thought Content: Thought content normal.         Judgment: Judgment normal.           No results for input(s): \"HGB\", \"PLT\", \"INR\", \"NA\", \"POTASSIUM\", \"CR\", \"A1C\" in the last 8760 hours.     Diagnostics  No labs were ordered during this visit.   No EKG required, no history of coronary heart disease, significant arrhythmia, peripheral arterial disease or other structural heart disease.    Revised Cardiac Risk Index (RCRI)  The patient has the following serious cardiovascular risks for perioperative complications:   - No serious cardiac risks = 0 points     RCRI Interpretation: 0 points: Class I (very low risk - 0.4% complication rate)         Signed Electronically by: Fay Cuellar PA-C  A copy of this evaluation report is provided to the requesting physician.        "

## 2024-09-03 NOTE — PROGRESS NOTES
"Preventive Care Visit  [facility] MAHENDRA Cuellar PA-C, Family Medicine  Sep 3, 2024      Assessment & Plan     Hyperlipidemia, unspecified hyperlipidemia type  Follow up as needed  - Lipid panel reflex to direct LDL Non-fasting; Future  - Lipid panel reflex to direct LDL Non-fasting    Preop general physical exam  No concerns for surgery     Family history of ischemic heart disease  Asymptotic but due to family hx referral in    - REFERRAL TO [facility] FOR CARDIOVASCULAR DISEASE PREVN    Benign essential hypertension  At goal.  No changes   - lisinopril (ZESTRIL) 30 MG tablet; Take 1 tablet (30 mg) by mouth daily.  - Basic metabolic panel  (Ca, Cl, CO2, Creat, Gluc, K, Na, BUN); Future  - Basic metabolic panel  (Ca, Cl, CO2, Creat, Gluc, K, Na, BUN)    Routine general medical examination at a health care facility  Overall doing well     Bilateral inguinal hernia without obstruction or gangrene, recurrence not specified  No concerns noted for surgery             BMI  Estimated body mass index is 30.24 kg/m  as calculated from the following:    Height as of this encounter: 1.745 m (5' 8.7\").    Weight as of this encounter: 92.1 kg (203 lb).   Weight management plan: not addressed     Counseling  Appropriate preventive services were addressed with this patient via screening, questionnaire, or discussion as appropriate for fall prevention, nutrition, physical activity, Tobacco-use cessation, social engagement, weight loss and cognition.  Checklist reviewing preventive services available has been given to the patient.  Reviewed patient's diet, addressing concerns and/or questions.   The patient was instructed to see the dentist every 6 months.   He is at risk for psychosocial distress and has been provided with information to reduce risk.   The patient was provided with written information regarding signs of hearing loss.           Anibal Kennedy is a 69 year old, presenting for " the following:  Pre-Op Exam and Physical        9/3/2024    10:08 AM   Additional Questions   Roomed by Chelo   Accompanied by self        Health Care Directive  Patient does not have a Health Care Directive or Living Will: Discussed advance care planning with patient; information given to patient to review.    HPI              8/29/2024   General Health   How would you rate your overall physical health? (!) FAIR   Feel stress (tense, anxious, or unable to sleep) Only a little      (!) STRESS CONCERN      8/29/2024   Nutrition   Diet: Regular (no restrictions)            8/29/2024   Exercise   Days per week of moderate/strenous exercise 0 days   Average minutes spent exercising at this level 0 min      (!) EXERCISE CONCERN      8/29/2024   Social Factors   Frequency of gathering with friends or relatives Once a week   Worry food won't last until get money to buy more No   Food not last or not have enough money for food? No   Do you have housing? (Housing is defined as stable permanent housing and does not include staying ouside in a car, in a tent, in an abandoned building, in an overnight shelter, or couch-surfing.) Yes   Are you worried about losing your housing? No   Lack of transportation? No   Unable to get utilities (heat,electricity)? Yes   Want help with housing or utility concern? No      (!) FINANCIAL RESOURCE STRAIN CONCERN      8/29/2024   Fall Risk   Fallen 2 or more times in the past year? No   Trouble with walking or balance? No             8/29/2024   Activities of Daily Living- Home Safety   Needs help with the following daily activites None of the above   Safety concerns in the home None of the above            8/29/2024   Dental   Dentist two times every year? (!) NO            8/29/2024   Hearing Screening   Hearing concerns? (!) TROUBLE UNDERSTANDING SOFT OR WHISPERED SPEECH.            8/29/2024   Driving Risk Screening   Patient/family members have concerns about driving No             8/29/2024   General Alertness/Fatigue Screening   Have you been more tired than usual lately? No            8/29/2024   Urinary Incontinence Screening   Bothered by leaking urine in past 6 months No            8/29/2024   TB Screening   Were you born outside of the [place]? No            Today's PHQ-2 Score:       9/2/2024    10:42 AM   PHQ-2 ( 1999 [employer])   Q1: Little interest or pleasure in doing things 0   Q2: Feeling down, depressed or hopeless 0   PHQ-2 Score 0   Q1: Little interest or pleasure in doing things Not at all   Q2: Feeling down, depressed or hopeless Not at all   PHQ-2 Score 0           8/29/2024   Substance Use   Alcohol more than 3/day or more than 7/wk No   Do you have a current opioid prescription? No   How severe/bad is pain from 1 to 10? 4/10   Do you use any other substances recreationally? No        Social History     Tobacco Use    Smoking status: Former     Current packs/day: 0.50     Average packs/day: 0.5 packs/day for 10.0 years (5.0 ttl pk-yrs)     Types: Cigarettes     Passive exposure: Past    Smokeless tobacco: Never   Vaping Use    Vaping status: Never Used   Substance Use Topics    Alcohol use: No     Comment: very little    Drug use: No       ASCVD Risk   The 10-year ASCVD risk score (Martin GARCIA, et al., 2019) is: 31.7%    Values used to calculate the score:      Age: 69 years      Sex: Male      Is Non- : No      Diabetic: No      Tobacco smoker: No      Systolic Blood Pressure: 138 mmHg      Is BP treated: Yes      HDL Cholesterol: 32 mg/dL      Total Cholesterol: 260 mg/dL            Reviewed and updated as needed this visit by Provider                      Current providers sharing in care for this patient include:  Patient Care Team:  Fay Cuellar PA-C as PCP - General (Family Medicine)  Fay Cuellar PA-C as Assigned PCP  Dilshad Mosquera DO as Assigned Neuroscience Provider  Clayton Gates DO as Physician  "(Surgery)  Raymond Castellon PA as Physician Assistant  Clayton Gates, DO as Assigned Surgical Provider    The following health maintenance items are reviewed in Epic and correct as of today:  Health Maintenance   Topic Date Due    ZOSTER IMMUNIZATION (1 of 2) Never done    RSV VACCINE (1 - 1-dose 60+ series) Never done    MEDICARE ANNUAL WELLNESS VISIT  12/02/2023    ANNUAL REVIEW OF HM ORDERS  12/02/2023    LIPID  12/07/2023    INFLUENZA VACCINE (1) 09/01/2024    COVID-19 Vaccine (6 - 2023-24 season) 09/01/2024    FALL RISK ASSESSMENT  09/03/2025    GLUCOSE  07/17/2026    DTAP/TDAP/TD IMMUNIZATION (3 - Td or Tdap) 11/21/2028    ADVANCE CARE PLANNING  09/03/2029    COLORECTAL CANCER SCREENING  04/09/2031    HEPATITIS C SCREENING  Completed    PHQ-2 (once per calendar year)  Completed    Pneumococcal Vaccine: 65+ Years  Completed    AORTIC ANEURYSM SCREENING (SYSTEM ASSIGNED)  Completed    HPV IMMUNIZATION  Aged Out    MENINGITIS IMMUNIZATION  Aged Out    RSV MONOCLONAL ANTIBODY  Aged Out    LUNG CANCER SCREENING  Discontinued         Review of Systems  CONSTITUTIONAL: NEGATIVE for fever, chills, change in weight  INTEGUMENTARY/SKIN: small rash on thumb   EYES: NEGATIVE for vision changes or irritation  ENT/MOUTH: NEGATIVE for ear, mouth and throat problems  RESP: NEGATIVE for significant cough or SOB  CV: NEGATIVE for chest pain, palpitations or peripheral edema  GI: NEGATIVE for nausea, abdominal pain, heartburn, or change in bowel habits  : NEGATIVE for frequency, dysuria, or hematuria  MUSCULOSKELETAL: NEGATIVE for significant arthralgias or myalgia  NEURO: NEGATIVE for weakness, dizziness or paresthesias  ENDOCRINE: NEGATIVE for temperature intolerance, skin/hair changes  PSYCHIATRIC: NEGATIVE for changes in mood or affect     Objective    Exam  /77   Pulse 82   Temp 97.8  F (36.6  C) (Temporal)   Resp 18   Ht 1.745 m (5' 8.7\")   Wt 92.1 kg (203 lb)   SpO2 98%   BMI 30.24 kg/m   " "  Estimated body mass index is 30.24 kg/m  as calculated from the following:    Height as of this encounter: 1.745 m (5' 8.7\").    Weight as of this encounter: 92.1 kg (203 lb).    Physical Exam  Constitutional:       General: He is not in acute distress.     Appearance: He is well-developed.   HENT:      Right Ear: Tympanic membrane, ear canal and external ear normal.      Left Ear: Tympanic membrane, ear canal and external ear normal.      Mouth/Throat:      Mouth: No oral lesions.      Pharynx: No oropharyngeal exudate.   Eyes:      Conjunctiva/sclera: Conjunctivae normal.   Neck:      Thyroid: No thyromegaly.      Trachea: No tracheal deviation.   Cardiovascular:      Rate and Rhythm: Normal rate and regular rhythm.      Heart sounds: Normal heart sounds, S1 normal and S2 normal. No murmur heard.     No S3 or S4 sounds.   Pulmonary:      Effort: Pulmonary effort is normal. No respiratory distress.      Breath sounds: Normal breath sounds. No wheezing or rales.   Abdominal:      General: Bowel sounds are normal.      Palpations: Abdomen is soft. There is no mass.      Tenderness: There is no abdominal tenderness.   Musculoskeletal:         General: No deformity. Normal range of motion.      Cervical back: Neck supple.   Lymphadenopathy:      Cervical: No cervical adenopathy.   Skin:     General: Skin is warm and dry.      Findings: No lesion or rash.   Neurological:      Mental Status: He is alert and oriented to person, place, and time.      Motor: No abnormal muscle tone.      Deep Tendon Reflexes: Reflexes are normal and symmetric.   Psychiatric:         Speech: Speech normal.         Thought Content: Thought content normal.         Judgment: Judgment normal.               9/3/2024   Mini Cog   Clock Draw Score 2 Normal   3 Item Recall 3 objects recalled   Mini Cog Total Score 5                Signed Electronically by: aFy Cuellar PA-C    "

## 2024-09-03 NOTE — PATIENT INSTRUCTIONS
How to Take Your Medication Before Surgery  Preoperative Medication Instructions   Antiplatelet or Anticoagulation Medication Instructions   - Patient is on no antiplatelet or anticoagulation medications.    Additional Medication Instructions   - ACE/ARB: DO NOT TAKE on day of surgery (minimum 11 hours for general anesthesia).       Patient Education   Preparing for Your Surgery  Getting started  A nurse will call you to review your health history and instructions. They will give you an arrival time based on your scheduled surgery time. Please be ready to share:  Your doctor's clinic name and phone number  Your medical, surgical, and anesthesia history  A list of allergies and sensitivities  A list of medicines, including herbal treatments and over-the-counter drugs  Whether the patient has a legal guardian (ask how to send us the papers in advance)  Please tell us if you're pregnant--or if there's any chance you might be pregnant. Some surgeries may injure a fetus (unborn baby), so they require a pregnancy test. Surgeries that are safe for a fetus don't always need a test, and you can choose whether to have one.   If you have a child who's having surgery, please ask for a copy of Preparing for Your Child's Surgery.    Preparing for surgery  Within 10 to 30 days of surgery: Have a pre-op exam (sometimes called an H&P, or History and Physical). This can be done at a clinic or pre-operative center.  If you're having a , you may not need this exam. Talk to your care team.  At your pre-op exam, talk to your care team about all medicines you take. If you need to stop any medicines before surgery, ask when to start taking them again.  We do this for your safety. Many medicines can make you bleed too much during surgery. Some change how well surgery (anesthesia) drugs work.  Call your insurance company to let them know you're having surgery. (If you don't have insurance, call [phone].)  Call your clinic if  there's any change in your health. This includes signs of a cold or flu (sore throat, runny nose, cough, rash, fever). It also includes a scrape or scratch near the surgery site.  If you have questions on the day of surgery, call your hospital or surgery center.  Eating and drinking guidelines  For your safety: Unless your surgeon tells you otherwise, follow the guidelines below.  Eat and drink as usual until 8 hours before you arrive for surgery. After that, no food or milk.  Drink clear liquids until 2 hours before you arrive. These are liquids you can see through, like water, Gatorade, and Propel Water. They also include plain black coffee and tea (no cream or milk), candy, and breath mints. You can spit out gum when you arrive.  If you drink alcohol: Stop drinking it the night before surgery.  If your care team tells you to take medicine on the morning of surgery, it's okay to take it with a sip of water.  Preventing infection  Shower or bathe the night before and morning of your surgery. Follow the instructions your clinic gave you. (If no instructions, use regular soap.)  Don't shave or clip hair near your surgery site. We'll remove the hair if needed.  Don't smoke or vape the morning of surgery. You may chew nicotine gum up to 2 hours before surgery. A nicotine patch is okay.  Note: Some surgeries require you to completely quit smoking and nicotine. Check with your surgeon.  Your care team will make every effort to keep you safe from infection. We will:  Clean our hands often with soap and water (or an alcohol-based hand rub).  Clean the skin at your surgery site with a special soap that kills germs.  Give you a special gown to keep you warm. (Cold raises the risk of infection.)  Wear special hair covers, masks, gowns and gloves during surgery.  Give antibiotic medicine, if prescribed. Not all surgeries need antibiotics.  What to bring on the day of surgery  Photo ID and insurance card  Copy of your health  care directive, if you have one  Glasses and hearing aids (bring cases)  You can't wear contacts during surgery  Inhaler and eye drops, if you use them (tell us about these when you arrive)  CPAP machine or breathing device, if you use them  A few personal items, if spending the night  If you have . . .  A pacemaker, ICD (cardiac defibrillator) or other implant: Bring the ID card.  An implanted stimulator: Bring the remote control.  A legal guardian: Bring a copy of the certified (court-stamped) guardianship papers.  Please remove any jewelry, including body piercings. Leave jewelry and other valuables at home.  If you're going home the day of surgery  You must have a responsible adult drive you home. They should stay with you overnight as well.  If you don't have someone to stay with you, and you aren't safe to go home alone, we may keep you overnight. Insurance often won't pay for this.  After surgery  If it's hard to control your pain or you need more pain medicine, please call your surgeon's office.  Questions?   If you have any questions for your care team, list them here: _________________________________________________________________________________________________________________________________________________________________________ ____________________________________ ____________________________________ ____________________________________  For informational purposes only. Not to replace the advice of your health care provider. Copyright   2003, 2019 [employer]. All rights reserved. Clinically reviewed by Latricia Centeno MD. [facility] [id number] - REV 12/22.       Patient Education   Preventive Care Advice   This is general advice given by our system to help you stay healthy. However, your care team may have specific advice just for you. Please talk to your care team about your preventive care needs.  Nutrition  Eat 5 or more servings of fruits and vegetables each day.  Try wheat bread, brown  rice and whole grain pasta (instead of white bread, rice, and pasta).  Get enough calcium and vitamin D. Check the label on foods and aim for 100% of the RDA (recommended daily allowance).  Lifestyle  Exercise at least 150 minutes each week  (30 minutes a day, 5 days a week).  Do muscle strengthening activities 2 days a week. These help control your weight and prevent disease.  No smoking.  Wear sunscreen to prevent skin cancer.  Have a dental exam and cleaning every 6 months.  Yearly exams  See your health care team every year to talk about:  Any changes in your health.  Any medicines your care team has prescribed.  Preventive care, family planning, and ways to prevent chronic diseases.  Shots (vaccines)   HPV shots (up to age 26), if you've never had them before.  Hepatitis B shots (up to age 59), if you've never had them before.  COVID-19 shot: Get this shot when it's due.  Flu shot: Get a flu shot every year.  Tetanus shot: Get a tetanus shot every 10 years.  Pneumococcal, hepatitis A, and RSV shots: Ask your care team if you need these based on your risk.  Shingles shot (for age 50 and up)  General health tests  Diabetes screening:  Starting at age 35, Get screened for diabetes at least every 3 years.  If you are younger than age 35, ask your care team if you should be screened for diabetes.  Cholesterol test: At age 39, start having a cholesterol test every 5 years, or more often if advised.  Bone density scan (DEXA): At age 50, ask your care team if you should have this scan for osteoporosis (brittle bones).  Hepatitis C: Get tested at least once in your life.  STIs (sexually transmitted infections)  Before age 24: Ask your care team if you should be screened for STIs.  After age 24: Get screened for STIs if you're at risk. You are at risk for STIs (including HIV) if:  You are sexually active with more than one person.  You don't use condoms every time.  You or a partner was diagnosed with a sexually  transmitted infection.  If you are at risk for HIV, ask about PrEP medicine to prevent HIV.  Get tested for HIV at least once in your life, whether you are at risk for HIV or not.  Cancer screening tests  Cervical cancer screening: If you have a cervix, begin getting regular cervical cancer screening tests starting at age 21.  Breast cancer scan (mammogram): If you've ever had breasts, begin having regular mammograms starting at age 40. This is a scan to check for breast cancer.  Colon cancer screening: It is important to start screening for colon cancer at age 45.  Have a colonoscopy test every 10 years (or more often if you're at risk) Or, ask your provider about stool tests like a FIT test every year or Cologuard test every 3 years.  To learn more about your testing options, visit:   .  For help making a decision, visit:   https://bit.ly/hm03018.  Prostate cancer screening test: If you have a prostate, ask your care team if a prostate cancer screening test (PSA) at age 55 is right for you.  Lung cancer screening: If you are a current or former smoker ages 50 to 80, ask your care team if ongoing lung cancer screenings are right for you.  For informational purposes only. Not to replace the advice of your health care provider. Copyright   2023 [facility]. All rights reserved. Clinically reviewed by the [facility] Transitions Program. [employer] [id number] - REV 01/24.  Hearing Loss: Care Instructions  Overview     Hearing loss is a sudden or slow decrease in how well you hear. It can range from slight to profound. Permanent hearing loss can occur with aging. It also can happen when you are exposed long-term to loud noise. Examples include listening to loud music, riding motorcycles, or being around other loud machines.  Hearing loss can affect your work and home life. It can make you feel lonely or depressed. You may feel that you have lost your independence. But hearing aids and other devices can  help you hear better and feel connected to others.  Follow-up care is a key part of your treatment and safety. Be sure to make and go to all appointments, and call your doctor if you are having problems. It's also a good idea to know your test results and keep a list of the medicines you take.  How can you care for yourself at home?  Avoid loud noises whenever possible. This helps keep your hearing from getting worse.  Always wear hearing protection around loud noises.  Wear a hearing aid as directed.  A professional can help you pick a hearing aid that will work best for you.  You can also get hearing aids over the counter for mild to moderate hearing loss.  Have hearing tests as your doctor suggests. They can show whether your hearing has changed. Your hearing aid may need to be adjusted.  Use other devices as needed. These may include:  Telephone amplifiers and hearing aids that can connect to a television, stereo, radio, or microphone.  Devices that use lights or vibrations. These alert you to the doorbell, a ringing telephone, or a baby monitor.  Television closed-captioning. This shows the words at the bottom of the screen. Most new TVs can do this.  TTY (text telephone). This lets you type messages back and forth on the telephone instead of talking or listening. These devices are also called TDD. When messages are typed on the keyboard, they are sent over the phone line to a receiving TTY. The message is shown on a monitor.  Use text messaging, social media, and email if it is hard for you to communicate by telephone.  Try to learn a listening technique called speechreading. It is not lipreading. You pay attention to people's gestures, expressions, posture, and tone of voice. These clues can help you understand what a person is saying. Face the person you are talking to, and have them face you. Make sure the lighting is good. You need to see the other person's face clearly.  Think about counseling if you need  "help to adjust to your hearing loss.  When should you call for help?  Watch closely for changes in your health, and be sure to contact your doctor if:    You think your hearing is getting worse.     You have new symptoms, such as dizziness or nausea.   Where can you learn more?  Go to https://www.[employer].net/patiented  Enter R798 in the search box to learn more about \"Hearing Loss: Care Instructions.\"  Current as of: September 27, 2023               Content Version: 14.0    [id number] [employer].   Care instructions adapted under license by your healthcare professional. If you have questions about a medical condition or this instruction, always ask your healthcare professional. [employer] disclaims any warranty or liability for your use of this information.         "

## 2024-09-05 ENCOUNTER — ANESTHESIA EVENT (OUTPATIENT)
Dept: SURGERY | Facility: CLINIC | Age: 69
End: 2024-09-05
Payer: COMMERCIAL

## 2024-09-05 ASSESSMENT — LIFESTYLE VARIABLES: TOBACCO_USE: 1

## 2024-09-06 ENCOUNTER — HOSPITAL ENCOUNTER (OUTPATIENT)
Facility: CLINIC | Age: 69
Discharge: HOME | End: 2024-09-06
Attending: SURGERY | Admitting: SURGERY
Payer: COMMERCIAL

## 2024-09-06 ENCOUNTER — ANESTHESIA (OUTPATIENT)
Dept: SURGERY | Facility: CLINIC | Age: 69
End: 2024-09-06
Payer: COMMERCIAL

## 2024-09-06 VITALS
SYSTOLIC BLOOD PRESSURE: 122 MMHG | DIASTOLIC BLOOD PRESSURE: 62 MMHG | OXYGEN SATURATION: 96 % | WEIGHT: 203 LBS | RESPIRATION RATE: 14 BRPM | HEART RATE: 86 BPM | BODY MASS INDEX: 30.24 KG/M2 | TEMPERATURE: 97.7 F

## 2024-09-06 DIAGNOSIS — Z87.19 S/P LAPAROSCOPIC HERNIA REPAIR: Primary | ICD-10-CM

## 2024-09-06 DIAGNOSIS — Z98.890 S/P LAPAROSCOPIC HERNIA REPAIR: Primary | ICD-10-CM

## 2024-09-06 PROCEDURE — 250N000013 HC RX MED GY IP 250 OP 250 PS 637: Performed by: SURGERY

## 2024-09-06 PROCEDURE — 370N000017 HC ANESTHESIA TECHNICAL FEE, PER MIN: Performed by: SURGERY

## 2024-09-06 PROCEDURE — 250N000009 HC RX 250: Performed by: NURSE ANESTHETIST, CERTIFIED REGISTERED

## 2024-09-06 PROCEDURE — 710N000012 HC RECOVERY PHASE 2, PER MINUTE: Performed by: SURGERY

## 2024-09-06 PROCEDURE — 250N000011 HC RX IP 250 OP 636: Performed by: NURSE ANESTHETIST, CERTIFIED REGISTERED

## 2024-09-06 PROCEDURE — 999N000141 HC STATISTIC PRE-PROCEDURE NURSING ASSESSMENT: Performed by: SURGERY

## 2024-09-06 PROCEDURE — 250N000025 HC SEVOFLURANE, PER MIN: Performed by: SURGERY

## 2024-09-06 PROCEDURE — 710N000010 HC RECOVERY PHASE 1, LEVEL 2, PER MIN: Performed by: SURGERY

## 2024-09-06 PROCEDURE — 258N000003 HC RX IP 258 OP 636: Performed by: NURSE ANESTHETIST, CERTIFIED REGISTERED

## 2024-09-06 PROCEDURE — 250N000009 HC RX 250: Performed by: SURGERY

## 2024-09-06 PROCEDURE — C1781 MESH (IMPLANTABLE): HCPCS | Performed by: SURGERY

## 2024-09-06 PROCEDURE — 49650 LAP ING HERNIA REPAIR INIT: CPT | Mod: 50 | Performed by: SURGERY

## 2024-09-06 PROCEDURE — 250N000013 HC RX MED GY IP 250 OP 250 PS 637: Performed by: NURSE ANESTHETIST, CERTIFIED REGISTERED

## 2024-09-06 PROCEDURE — 250N000011 HC RX IP 250 OP 636: Performed by: SURGERY

## 2024-09-06 PROCEDURE — 272N000001 HC OR GENERAL SUPPLY STERILE: Performed by: SURGERY

## 2024-09-06 PROCEDURE — 360N000080 HC SURGERY LEVEL 7, PER MIN: Performed by: SURGERY

## 2024-09-06 DEVICE — MESH DEXTILE ANATOMICAL 15CM X 10CM LG RIGHT DXT1510AR: Type: IMPLANTABLE DEVICE | Site: ABDOMEN | Status: FUNCTIONAL

## 2024-09-06 DEVICE — MESH DEXTILE ANATOMICAL 15CM X 10CM LG LEFT DXT1510AL: Type: IMPLANTABLE DEVICE | Site: ABDOMEN | Status: FUNCTIONAL

## 2024-09-06 RX ORDER — OXYCODONE AND ACETAMINOPHEN 5; 325 MG/1; MG/1
1 TABLET ORAL
Status: COMPLETED | OUTPATIENT
Start: 2024-09-06 | End: 2024-09-06

## 2024-09-06 RX ORDER — BUPIVACAINE HYDROCHLORIDE AND EPINEPHRINE 2.5; 5 MG/ML; UG/ML
INJECTION, SOLUTION EPIDURAL; INFILTRATION; INTRACAUDAL; PERINEURAL PRN
Status: DISCONTINUED | OUTPATIENT
Start: 2024-09-06 | End: 2024-09-06 | Stop reason: HOSPADM

## 2024-09-06 RX ORDER — NALOXONE HYDROCHLORIDE 0.4 MG/ML
0.1 INJECTION, SOLUTION INTRAMUSCULAR; INTRAVENOUS; SUBCUTANEOUS
Status: DISCONTINUED | OUTPATIENT
Start: 2024-09-06 | End: 2024-09-06 | Stop reason: HOSPADM

## 2024-09-06 RX ORDER — ONDANSETRON 4 MG/1
4 TABLET, ORALLY DISINTEGRATING ORAL EVERY 30 MIN PRN
Status: DISCONTINUED | OUTPATIENT
Start: 2024-09-06 | End: 2024-09-06 | Stop reason: HOSPADM

## 2024-09-06 RX ORDER — HYDROMORPHONE HYDROCHLORIDE 1 MG/ML
0.5 INJECTION, SOLUTION INTRAMUSCULAR; INTRAVENOUS; SUBCUTANEOUS EVERY 5 MIN PRN
Status: DISCONTINUED | OUTPATIENT
Start: 2024-09-06 | End: 2024-09-06 | Stop reason: HOSPADM

## 2024-09-06 RX ORDER — FENTANYL CITRATE 50 UG/ML
50 INJECTION, SOLUTION INTRAMUSCULAR; INTRAVENOUS
Status: DISCONTINUED | OUTPATIENT
Start: 2024-09-06 | End: 2024-09-06 | Stop reason: HOSPADM

## 2024-09-06 RX ORDER — OXYCODONE HYDROCHLORIDE 5 MG/1
5 TABLET ORAL
Status: COMPLETED | OUTPATIENT
Start: 2024-09-06 | End: 2024-09-06

## 2024-09-06 RX ORDER — ONDANSETRON 2 MG/ML
4 INJECTION INTRAMUSCULAR; INTRAVENOUS EVERY 30 MIN PRN
Status: DISCONTINUED | OUTPATIENT
Start: 2024-09-06 | End: 2024-09-06 | Stop reason: HOSPADM

## 2024-09-06 RX ORDER — FENTANYL CITRATE 50 UG/ML
INJECTION, SOLUTION INTRAMUSCULAR; INTRAVENOUS PRN
Status: DISCONTINUED | OUTPATIENT
Start: 2024-09-06 | End: 2024-09-06

## 2024-09-06 RX ORDER — SODIUM CHLORIDE, SODIUM LACTATE, POTASSIUM CHLORIDE, CALCIUM CHLORIDE 600; 310; 30; 20 MG/100ML; MG/100ML; MG/100ML; MG/100ML
INJECTION, SOLUTION INTRAVENOUS CONTINUOUS
Status: DISCONTINUED | OUTPATIENT
Start: 2024-09-06 | End: 2024-09-06 | Stop reason: HOSPADM

## 2024-09-06 RX ORDER — HYDROMORPHONE HYDROCHLORIDE 1 MG/ML
0.25 INJECTION, SOLUTION INTRAMUSCULAR; INTRAVENOUS; SUBCUTANEOUS EVERY 5 MIN PRN
Status: DISCONTINUED | OUTPATIENT
Start: 2024-09-06 | End: 2024-09-06 | Stop reason: HOSPADM

## 2024-09-06 RX ORDER — FENTANYL CITRATE 50 UG/ML
50 INJECTION, SOLUTION INTRAMUSCULAR; INTRAVENOUS EVERY 5 MIN PRN
Status: DISCONTINUED | OUTPATIENT
Start: 2024-09-06 | End: 2024-09-06 | Stop reason: HOSPADM

## 2024-09-06 RX ORDER — PROPOFOL 10 MG/ML
INJECTION, EMULSION INTRAVENOUS PRN
Status: DISCONTINUED | OUTPATIENT
Start: 2024-09-06 | End: 2024-09-06

## 2024-09-06 RX ORDER — DIMENHYDRINATE 50 MG/ML
25 INJECTION, SOLUTION INTRAMUSCULAR; INTRAVENOUS
Status: DISCONTINUED | OUTPATIENT
Start: 2024-09-06 | End: 2024-09-06 | Stop reason: HOSPADM

## 2024-09-06 RX ORDER — OXYCODONE HYDROCHLORIDE 5 MG/1
10 TABLET ORAL
Status: DISCONTINUED | OUTPATIENT
Start: 2024-09-06 | End: 2024-09-06 | Stop reason: HOSPADM

## 2024-09-06 RX ORDER — CEFAZOLIN SODIUM/WATER 2 G/20 ML
2 SYRINGE (ML) INTRAVENOUS SEE ADMIN INSTRUCTIONS
Status: DISCONTINUED | OUTPATIENT
Start: 2024-09-06 | End: 2024-09-06 | Stop reason: HOSPADM

## 2024-09-06 RX ORDER — FENTANYL CITRATE 50 UG/ML
25 INJECTION, SOLUTION INTRAMUSCULAR; INTRAVENOUS EVERY 5 MIN PRN
Status: DISCONTINUED | OUTPATIENT
Start: 2024-09-06 | End: 2024-09-06 | Stop reason: HOSPADM

## 2024-09-06 RX ORDER — ONDANSETRON 2 MG/ML
INJECTION INTRAMUSCULAR; INTRAVENOUS PRN
Status: DISCONTINUED | OUTPATIENT
Start: 2024-09-06 | End: 2024-09-06

## 2024-09-06 RX ORDER — MEPERIDINE HYDROCHLORIDE 25 MG/ML
12.5 INJECTION INTRAMUSCULAR; INTRAVENOUS; SUBCUTANEOUS EVERY 5 MIN PRN
Status: DISCONTINUED | OUTPATIENT
Start: 2024-09-06 | End: 2024-09-06 | Stop reason: HOSPADM

## 2024-09-06 RX ORDER — CEFAZOLIN SODIUM/WATER 2 G/20 ML
2 SYRINGE (ML) INTRAVENOUS
Status: COMPLETED | OUTPATIENT
Start: 2024-09-06 | End: 2024-09-06

## 2024-09-06 RX ORDER — DEXAMETHASONE SODIUM PHOSPHATE 4 MG/ML
INJECTION, SOLUTION INTRA-ARTICULAR; INTRALESIONAL; INTRAMUSCULAR; INTRAVENOUS; SOFT TISSUE PRN
Status: DISCONTINUED | OUTPATIENT
Start: 2024-09-06 | End: 2024-09-06

## 2024-09-06 RX ORDER — OXYCODONE AND ACETAMINOPHEN 5; 325 MG/1; MG/1
1-2 TABLET ORAL EVERY 4 HOURS PRN
Qty: 12 TABLET | Refills: 0 | Status: SHIPPED | OUTPATIENT
Start: 2024-09-06

## 2024-09-06 RX ORDER — KETOROLAC TROMETHAMINE 30 MG/ML
INJECTION, SOLUTION INTRAMUSCULAR; INTRAVENOUS PRN
Status: DISCONTINUED | OUTPATIENT
Start: 2024-09-06 | End: 2024-09-06

## 2024-09-06 RX ORDER — LIDOCAINE HYDROCHLORIDE 20 MG/ML
INJECTION, SOLUTION INFILTRATION; PERINEURAL PRN
Status: DISCONTINUED | OUTPATIENT
Start: 2024-09-06 | End: 2024-09-06

## 2024-09-06 RX ADMIN — DEXMEDETOMIDINE HYDROCHLORIDE 8 MCG: 100 INJECTION, SOLUTION INTRAVENOUS at 13:53

## 2024-09-06 RX ADMIN — DEXAMETHASONE SODIUM PHOSPHATE 5 MG: 4 INJECTION, SOLUTION INTRA-ARTICULAR; INTRALESIONAL; INTRAMUSCULAR; INTRAVENOUS; SOFT TISSUE at 14:06

## 2024-09-06 RX ADMIN — OXYCODONE HYDROCHLORIDE 5 MG: 5 TABLET ORAL at 16:17

## 2024-09-06 RX ADMIN — FENTANYL CITRATE 50 MCG: 50 INJECTION INTRAMUSCULAR; INTRAVENOUS at 14:11

## 2024-09-06 RX ADMIN — LIDOCAINE HYDROCHLORIDE 0.2 ML: 10 INJECTION, SOLUTION EPIDURAL; INFILTRATION; INTRACAUDAL; PERINEURAL at 13:12

## 2024-09-06 RX ADMIN — PHENYLEPHRINE HYDROCHLORIDE 150 MCG: 10 INJECTION INTRAVENOUS at 14:47

## 2024-09-06 RX ADMIN — KETOROLAC TROMETHAMINE 15 MG: 30 INJECTION, SOLUTION INTRAMUSCULAR at 15:12

## 2024-09-06 RX ADMIN — SODIUM CHLORIDE, POTASSIUM CHLORIDE, SODIUM LACTATE AND CALCIUM CHLORIDE: 600; 310; 30; 20 INJECTION, SOLUTION INTRAVENOUS at 16:08

## 2024-09-06 RX ADMIN — Medication 2 G: at 13:45

## 2024-09-06 RX ADMIN — FENTANYL CITRATE 50 MCG: 50 INJECTION INTRAMUSCULAR; INTRAVENOUS at 13:48

## 2024-09-06 RX ADMIN — SUGAMMADEX 200 MG: 100 INJECTION, SOLUTION INTRAVENOUS at 15:19

## 2024-09-06 RX ADMIN — PHENYLEPHRINE HYDROCHLORIDE 150 MCG: 10 INJECTION INTRAVENOUS at 14:29

## 2024-09-06 RX ADMIN — ROCURONIUM BROMIDE 100 MG: 50 INJECTION, SOLUTION INTRAVENOUS at 13:57

## 2024-09-06 RX ADMIN — PROPOFOL 200 MG: 10 INJECTION, EMULSION INTRAVENOUS at 13:57

## 2024-09-06 RX ADMIN — PROPOFOL 75 MCG/KG/MIN: 10 INJECTION, EMULSION INTRAVENOUS at 14:06

## 2024-09-06 RX ADMIN — FENTANYL CITRATE 50 MCG: 50 INJECTION, SOLUTION INTRAMUSCULAR; INTRAVENOUS at 15:57

## 2024-09-06 RX ADMIN — OXYCODONE HYDROCHLORIDE AND ACETAMINOPHEN 1 TABLET: 5; 325 TABLET ORAL at 17:01

## 2024-09-06 RX ADMIN — HYDROMORPHONE HYDROCHLORIDE 0.5 MG: 1 INJECTION, SOLUTION INTRAMUSCULAR; INTRAVENOUS; SUBCUTANEOUS at 15:35

## 2024-09-06 RX ADMIN — LIDOCAINE HYDROCHLORIDE 50 MG: 20 INJECTION, SOLUTION INFILTRATION; PERINEURAL at 13:57

## 2024-09-06 RX ADMIN — DEXMEDETOMIDINE HYDROCHLORIDE 4 MCG: 100 INJECTION, SOLUTION INTRAVENOUS at 14:51

## 2024-09-06 RX ADMIN — ONDANSETRON 4 MG: 2 INJECTION INTRAMUSCULAR; INTRAVENOUS at 15:08

## 2024-09-06 RX ADMIN — SODIUM CHLORIDE, POTASSIUM CHLORIDE, SODIUM LACTATE AND CALCIUM CHLORIDE 10 ML/HR: 600; 310; 30; 20 INJECTION, SOLUTION INTRAVENOUS at 13:12

## 2024-09-06 ASSESSMENT — ACTIVITIES OF DAILY LIVING (ADL)
ADLS_ACUITY_SCORE: 35
ADLS_ACUITY_SCORE: 33

## 2024-09-06 NOTE — OP NOTE
Procedure Date:  9/6/2024     PROCEDURE:  Robot-assisted laparoscopic bilateral inguinal hernia repairs with mesh.     PREOPERATIVE DIAGNOSIS:  Bilateral inguinal hernia.     POSTOPERATIVE DIAGNOSIS:  Bilateral inguinal hernia.     SURGEON:  Clayton Gates DO     ASSISTANT:  none     ANESTHESIA:  General endotracheal anesthesia.     COMPLICATIONS:  None immediately apparent.     SPECIMENS:  None.     ESTIMATED BLOOD LOSS:  5 mL     FINDINGS: Bilateral direct inguinal hernias     INDICATIONS FOR PROCEDURE:  The patient is a 69-year-old male whom I met in the surgical clinic with complaints of painful groin bulge. History was confirmed by physical exam which showed bilateral inguinal hernia.  We discussed their options.  I recommended robot-assisted laparoscopic bilateral inguinal hernia repairs with mesh.  After informed discussion, they agreed to proceed with surgery.     DESCRIPTION OF PROCEDURE:  After the informed consent was obtained, the patient was brought from the preoperative holding area to the operating room and placed in supine position.  Anesthesia was induced.  They were prepped and draped in the normal sterile fashion.  Timeout was performed.  After the correct patient and correct procedure were verified, we began by making an 8 mm incision supraumbilically.  Veress needle was inserted into the peritoneal cavity and the abdomen was insufflated to 15 mmHg.  Robotic trocar was then placed.  Camera was inserted.  General survey of the abdomen revealed direct bilateral inguinal hernias.  The patient was placed in Trendelenburg position.  We placed 2 additional 8 mm robotic trocars in right and left mid abdomen under direct visualization.  We placed a right sided and left sided Dextile mesh into the peritoneal cavity as well as two 2-0 Vicryl sutures and two 2-0 Stratafix sutures into the peritoneal cavity as well.  The robot was then docked.  We used a Force Bipolar grasper in the left arm and scissors  in the right arm.  We began on the patient's right.  We incised the peritoneum in the right lower quadrant.  This incision was brought medially and laterally.  Blunt dissection with a minimal amount of electrocautery was used to dissect the preperitoneal space down to the hernia and pubic tubercle medially.  The hernia sac was dissected away from the cord structures.  Once an appropriate area had been cleared, the mesh was placed in the preperitoneal space and laid flat.  We then did the mirror image procedure on the left lower quadrant, incising the peritoneum, developing the preperitoneal plane and placing the mesh. On the right side, we secured the mesh into place and using the 2-0 Vicryl suture in an interrupted fashion, 1 suture above the pubic tubercle and 2 additional sutures flanking the inferior epigastric vessels.  Same was done on the left.  The mesh appeared to be in good position and the peritoneum was closed appropriately with 2-0 Stratafix suture.  The 4 needles were removed from the peritoneal cavity without any issue.  Another survey of the abdomen revealed no apparent complications.  The robot was then undocked.  The patient's abdomen was then desufflated, while the ports were removed under direct visualization.  The skin was instilled with 0.25% Marcaine with epinephrine for local anesthesia.  Skin was closed with inverted interrupted 4-0 Monocryl sutures and topical adhesive dressing was applied.  At the completion of the case, all instruments, needles and sponges were accounted for, after a correct count.  The patient was then awoken from anesthesia and brought to recovery room in stable condition.       Clayton Gates DO, FACS

## 2024-09-06 NOTE — ANESTHESIA PROCEDURE NOTES
Airway       Patient location during procedure: OR       Procedure Start/Stop Times: 9/6/2024 1:59 PM  Staff -        CRNA: Tanner Perez APRN CRNA       Other Anesthesia Staff: Dhaval Barrera       Performed By: ARNIE and with CRNAs       Procedure performed by resident/fellow/CRNA in presence of a teaching physician.    Consent for Airway        Urgency: elective  Indications and Patient Condition       Indications for airway management: teresa-procedural       Induction type:RSI       Mask difficulty assessment: 0 - not attempted (RSI)    Final Airway Details       Final airway type: endotracheal airway       Successful airway: Oral  Endotracheal Airway Details        ETT size (mm): 7.5       Cuffed: yes       Cuff volume (mL): 8       Successful intubation technique: direct laryngoscopy       DL Blade Type: Marie 2       Grade View of Cords: 1       Adjucts: stylet       Position: Right       Measured from: gums/teeth       Secured at (cm): 22       Bite block used: None    Post intubation assessment        Placement verified by: capnometry, equal breath sounds and chest rise        Number of attempts at approach: 2       Number of other approaches attempted: 0       Secured with: tape       Ease of procedure: easy       Dentition: Intact and Unchanged    Medication(s) Administered   Medication Administration Time: 9/6/2024 1:59 PM    Additional Comments       Patient shared frequent GERD symptoms especially when laying flat due to history of hiatal hernia. To decrease patient risk of aspiration, patient intubated in reversed trendelenburg with an RSI.

## 2024-09-06 NOTE — ANESTHESIA PREPROCEDURE EVALUATION
Anesthesia Pre-Procedure Evaluation    Patient: Krishna Dugan   MRN: [id number] : 1955        Procedure : Procedure(s):  REPAIR, HERNIA, INGUINAL, BILATERAL, ROBOT-ASSISTED, LAPAROSCOPIC, USING DA JOSE MIGUEL XI          Past Medical History:   Diagnosis Date     Diagnostic skin and sensitization tests  skin tests pos. for cat/dog ONLY (all other environmental allergens NEGATIVE)      Past Surgical History:   Procedure Laterality Date     ESOPHAGOSCOPY, GASTROSCOPY, DUODENOSCOPY (EGD), COMBINED N/A 3/14/2023    Procedure: ESOPHAGOGASTRODUODENOSCOPY (EGD);  Surgeon: Sanchez Franco MD;  Location: [facility]      Allergies   Allergen Reactions     Statins [Statins] Muscle Pain (Myalgia)     Valdecoxib      Ears turn purple  Facial flushing  Bextra      Social History     Tobacco Use     Smoking status: Former     Current packs/day: 0.50     Average packs/day: 0.5 packs/day for 10.0 years (5.0 ttl pk-yrs)     Types: Cigarettes     Passive exposure: Past     Smokeless tobacco: Never   Substance Use Topics     Alcohol use: No     Comment: very little      Wt Readings from Last 1 Encounters:   24 92.1 kg (203 lb)        Anesthesia Evaluation   Pt has had prior anesthetic. Type: General and MAC.        ROS/MED HX  ENT/Pulmonary:     (+)                tobacco use, Past use,                       Neurologic: Comment: Essential tremor       Cardiovascular:     (+) Dyslipidemia hypertension- -   -  - -                          Irregular Heartbeat/Palpitations,       Previous cardiac testing   Echo: Date: Results:    Stress Test:  Date: Results:    ECG Reviewed:  Date: 2021 Results:  SR  Cath:  Date: Results:      METS/Exercise Tolerance:     Hematologic:  - neg hematologic  ROS     Musculoskeletal: Comment: Rotator cuff repair      GI/Hepatic:     (+) GERD, Symptomatic,    hiatal hernia,              Renal/Genitourinary:  - neg Renal ROS     Endo: Comment: Pre-Diabetes- AIC 6 - neg endo ROS    "  Psychiatric/Substance Use:  - neg psychiatric ROS     Infectious Disease:  - neg infectious disease ROS     Malignancy:  - neg malignancy ROS     Other:  - neg other ROS          Physical Exam    Airway  airway exam normal      Mallampati: II   TM distance: > 3 FB   Neck ROM: full   Mouth opening: > 3 cm    Respiratory Devices and Support         Dental       (+) Modest Abnormalities - crowns, retainers, 1 or 2 missing teeth      Cardiovascular   cardiovascular exam normal       Rhythm and rate: regular and normal     Pulmonary   pulmonary exam normal        breath sounds clear to auscultation       OUTSIDE LABS:  CBC:   Lab Results   Component Value Date    WBC 10.0 07/17/2023    WBC 8.4 12/07/2022    HGB 13.7 07/17/2023    HGB 13.7 12/07/2022    HCT 42.2 07/17/2023    HCT 42.2 12/07/2022     07/17/2023     12/07/2022     BMP:   Lab Results   Component Value Date     09/03/2024     07/17/2023    POTASSIUM 5.1 09/03/2024    POTASSIUM 4.4 07/17/2023    CHLORIDE 101 09/03/2024    CHLORIDE 101 07/17/2023    CO2 25 09/03/2024    CO2 25 07/17/2023    BUN 14.0 09/03/2024    BUN 13.0 07/17/2023    CR 1.08 09/03/2024    CR 1.06 07/17/2023     (H) 09/03/2024     (H) 07/17/2023     COAGS: No results found for: \"PTT\", \"INR\", \"FIBR\"  POC: No results found for: \"BGM\", \"HCG\", \"HCGS\"  HEPATIC:   Lab Results   Component Value Date    ALBUMIN 3.8 12/07/2022    PROTTOTAL 8.2 12/07/2022    ALT 27 12/07/2022    AST 17 12/07/2022    ALKPHOS 62 12/07/2022    BILITOTAL 0.5 12/07/2022    KUMAR 13 09/27/2005     OTHER:   Lab Results   Component Value Date    LACT 1.0 09/27/2005    A1C 6.0 (H) 12/07/2022    LILLIAN 9.9 09/03/2024    TSH 1.39 12/07/2022       Anesthesia Plan    ASA Status:  2    NPO Status:  NPO Appropriate    Anesthesia Type: General.     - Airway: ETT   Induction: Intravenous, Propofol.   Maintenance: Balanced.        Consents    Anesthesia Plan(s) and associated risks, benefits, and " "realistic alternatives discussed. Questions answered and patient/representative(s) expressed understanding.     - Discussed:     - Discussed with:  Patient       Use of blood products discussed: No .     Postoperative Care    Pain management: IV analgesics, Oral pain medications.   PONV prophylaxis: Ondansetron (or other 5HT-3), Dexamethasone or Solumedrol     Comments:    Other Comments: The risks and benefits of anesthesia, and the alternatives where applicable, have been discussed with the patient, and they wish to proceed.           LAM Arcos CRNA    I have reviewed the pertinent notes and labs in the chart from the past 30 days and (re)examined the patient.  Any updates or changes from those notes are reflected in this note.              # Obesity: Estimated body mass index is 30.24 kg/m  as calculated from the following:    Height as of 9/3/24: 1.745 m (5' 8.7\").    Weight as of 9/3/24: 92.1 kg (203 lb).      "

## 2024-09-06 NOTE — ANESTHESIA CARE TRANSFER NOTE
Patient: Krishna Dugan    Procedure: Procedure(s):  REPAIR, HERNIA, INGUINAL, BILATERAL, ROBOT-ASSISTED, LAPAROSCOPIC, USING DA JOSE MIGUEL XI WITH MESH.       Diagnosis: Non-recurrent bilateral inguinal hernia without obstruction or gangrene [K40.20]  Diagnosis Additional Information: No value filed.    Anesthesia Type:   General     Note:    Oropharynx: oropharynx clear of all foreign objects and spontaneously breathing  Level of Consciousness: drowsy  Oxygen Supplementation: face mask    Independent Airway: airway patency satisfactory and stable  Dentition: dentition unchanged  Vital Signs Stable: post-procedure vital signs reviewed and stable  Report to RN Given: handoff report given  Patient transferred to: PACU    Handoff Report: Identifed the Patient, Identified the Reponsible Provider, Reviewed the pertinent medical history, Discussed the surgical course, Reviewed Intra-OP anesthesia mangement and issues during anesthesia, Set expectations for post-procedure period and Allowed opportunity for questions and acknowledgement of understanding  Vitals:  Vitals Value Taken Time   /73 09/06/24 1538   Temp     Pulse 74 09/06/24 1541   Resp 16 09/06/24 1541   SpO2 89 % 09/06/24 1541   Vitals shown include unfiled device data.    Electronically Signed By: LAM Arcos CRNA  September 6, 2024  3:42 PM

## 2024-09-06 NOTE — DISCHARGE INSTRUCTIONS
[facility]   Adult Discharge Orders & Instructions     For 24 hours after surgery    Get plenty of rest.  A responsible adult must stay with you for at least 24 hours after you leave the hospital.   Do not drive or use heavy equipment.  If you have weakness or tingling, don't drive or use heavy equipment until this feeling goes away.  Do not drink alcohol.  Avoid strenuous or risky activities.  Ask for help when climbing stairs.   You may feel lightheaded.  If so, sit for a few minutes before standing.  Have someone help you get up.   You may have a slight fever. Call the doctor if your fever is over 100 F (37.7 C) (taken under the tongue) or lasts longer than 24 hours.  You may have a dry mouth, a sore throat, muscle aches or trouble sleeping.  These should go away after 24 hours.  Do not make important or legal decisions.  We don t expect you to have any problems from the surgery or treatment you had today. Just in case, here s what to do if you have pain, upset stomach (nausea), bleeding or infection:  Pain:  Take medicines your doctor has prescribed or over-the-counter medicine they have suggested. Resting and using ice packs can help, too. For surgery on an arm or leg, raise it on a pillow to ease swelling. Call your doctor if these methods don t work.  Copyright [employer], Licensed under [employer]  Upset stomach (nausea):  Take anti-nausea medicine approved by your doctor. Drink clear liquids like apple juice, ginger ale, broth or 7-Up. Be sure to drink enough fluids. Rest can help, too. Move to normal foods when you re ready.   Bleeding:  In the first 24 hours, you may see a little blood on your dressing, about the size of a quarter. You don t need to worry about this much blood, but if the blood spot keeps getting bigger:  Put pressure on the wound if you can, AND  Call your doctor.  Copyright [employer], Licensed under [employer]  Fever/Infection: Please call  your doctor if you have any of these signs:  Redness  Swelling  Wound feels warm  Pain gets worse  Bad-smelling fluid leaks from wound  Fever or chills  Call your doctor for any of the followin.  It has been over 8 to 10 hours since surgery and you are still not able to urinate (pass water).    2.  Headache for over 24 hours.    3  For patient questions :  General Surgery: [phone]  Any specialty not related to the above groups: [phone]    [facility]    Home Care Following Hernia Repair    Dr. Gates    Hernia Type:  Inguinal    Care of the Incision:  Surgical glue was used, keep your incision dry for 24 hours.  Then you may shower, but don t submerge under water for at least 2 weeks.  Gently pat your incision dry with a freshly laundered towel.  Do not touch your incision with bare hands or pick at scabs.  Leave your incision open to air.  Cover it only if clothing rubs or irritates it.  Activity:  Gradually increase your activity.  Walk short distances several times each day and increase the distance as your strength allows.  To promote circulation, do not cross your legs while sitting.  No strenuous lifting or straining for 2-3 weeks.   Do not lift anything over 10-20 pounds until your doctor approves an increase.  Return to work will be determined by the type of work you do and should be discussed with your physician.  Do not drive or operate equipment while taking prescription pain medicines.  You may drive 1 week after surgery if you have stopped taking prescription pain medicines and are pain-free enough to react quickly and make an emergency stop if necessary.    Diet:  Return to the diet you were on before surgery.  Drink plenty of  water.  Avoid foods that cause constipation.    REMEMBER--most prescription pain pills cause constipation.  Walking, extra fluids, and increased fiber (fresh fruits and vegetables, etc.) are natural remedies for constipation.  You can also  take mineral oil, 1-2 Tablespoons per day.  If still constipated you may try a stool softener such as Colace or Miralax.    Call Your Physician if You Have:  Redness, increased swelling or cloudy drainage from your incision.  A temperature of more than 101 degrees F.  Worsening pain in your incision not relieved by your prescription pain pills and/or a short rest.  Any questions or concerns about your recovery, please call     [facility] [phone]    After [facility](JANNA) [phone] Nurse Advice Line (24 hours a day)    Follow-up Care:  If appointment not already made, make an appointment 2-3 weeks after your surgery.  Call [phone]  May take ibuprofen after 9:00 P.M.

## 2024-09-07 NOTE — ANESTHESIA POSTPROCEDURE EVALUATION
Patient: Krishna Dugan    Procedure: Procedure(s):  REPAIR, HERNIA, INGUINAL, BILATERAL, ROBOT-ASSISTED, LAPAROSCOPIC, USING DA JOSE MIGUEL XI WITH MESH.       Anesthesia Type:  General    Note:  Disposition: Outpatient   Postop Pain Control: Uneventful            Sign Out: Well controlled pain   PONV: No   Neuro/Psych: Uneventful            Sign Out: Acceptable/Baseline neuro status   Airway/Respiratory: Uneventful            Sign Out: Acceptable/Baseline resp. status   CV/Hemodynamics: Uneventful            Sign Out: Acceptable CV status   Other NRE: NONE   DID A NON-ROUTINE EVENT OCCUR? No    Event details/Postop Comments:  Pt was happy with anesthesia care.  No complications.  I will follow up with the pt if needed.           Last vitals:  Vitals Value Taken Time   /67 09/06/24 1617   Temp 97.7  F (36.5  C) 09/06/24 1621   Pulse 70 09/06/24 1621   Resp 10 09/06/24 1621   SpO2 96 % 09/06/24 1621   Vitals shown include unfiled device data.    Electronically Signed By: LAM Arcos CRNA  September 6, 2024  7:41 PM

## 2024-09-12 ENCOUNTER — OFFICE VISIT (OUTPATIENT)
Dept: SURGERY | Facility: OTHER | Age: 69
End: 2024-09-12
Payer: COMMERCIAL

## 2024-09-12 VITALS
TEMPERATURE: 97.7 F | BODY MASS INDEX: 30.07 KG/M2 | HEIGHT: 69 IN | HEART RATE: 82 BPM | WEIGHT: 203 LBS | DIASTOLIC BLOOD PRESSURE: 72 MMHG | OXYGEN SATURATION: 97 % | SYSTOLIC BLOOD PRESSURE: 118 MMHG

## 2024-09-12 DIAGNOSIS — Z98.890 S/P LAPAROSCOPIC HERNIA REPAIR: Primary | ICD-10-CM

## 2024-09-12 DIAGNOSIS — Z87.19 S/P LAPAROSCOPIC HERNIA REPAIR: Primary | ICD-10-CM

## 2024-09-12 PROCEDURE — 99024 POSTOP FOLLOW-UP VISIT: CPT | Performed by: SURGERY

## 2024-09-12 ASSESSMENT — PAIN SCALES - GENERAL: PAINLEVEL: MILD PAIN (3)

## 2024-09-12 NOTE — LETTER
9/12/2024      Krishna Dugan  [id number] [address] [place] [id number]      Dear Colleague,    Thank you for referring your patient, Krishna Dugan, to the [facility]. Please see a copy of my visit note below.    General Surgery Follow Up    Pt returns for follow up visit s/p robot bilateral inguinal hernia repairs with mesh    HPI:  Doing pretty well.  Still somewhat sore but this is improving.  Initially had troubles urinating defecating but again this is improving and almost back to normal.  He has got a mild rash from the shaving of his skin.  He also has a mild pressure/burning type marks on his arm and upper chest of unknown source.  These are also improving.      Past Medical History:   Diagnosis Date     Diagnostic skin and sensitization tests 9/08 skin tests pos. for cat/dog ONLY (all other environmental allergens NEGATIVE)       Past Surgical History:   Procedure Laterality Date     ESOPHAGOSCOPY, GASTROSCOPY, DUODENOSCOPY (EGD), COMBINED N/A 3/14/2023    Procedure: ESOPHAGOGASTRODUODENOSCOPY (EGD);  Surgeon: Sanchez Franco MD;  Location:  GI     HERNIORRHAPHY, INGUINAL, BILATERAL, ROBOT-ASSISTED, LAPAROSCOPIC, USING DA JOSE MIGUEL XI Bilateral 9/6/2024    Procedure: REPAIR, HERNIA, INGUINAL, BILATERAL, ROBOT-ASSISTED, LAPAROSCOPIC, USING DA JOSE MIGUEL XI WITH MESH.;  Surgeon: Clayton Gates DO;  Location:  OR       Social History     Socioeconomic History     Marital status:      Spouse name: Not on file     Number of children: Not on file     Years of education: Not on file     Highest education level: Not on file   Occupational History     Not on file   Tobacco Use     Smoking status: Former     Current packs/day: 0.50     Average packs/day: 0.5 packs/day for 10.0 years (5.0 ttl pk-yrs)     Types: Cigarettes     Passive exposure: Past     Smokeless tobacco: Never   Vaping Use     Vaping status: Never Used   Substance and Sexual Activity     Alcohol use:  No     Comment: very little     Drug use: No     Sexual activity: Not Currently     Partners: Female   Other Topics Concern     Parent/sibling w/ CABG, MI or angioplasty before 65F 55M? Yes     Comment: dad at 53, brother 51   Social History Narrative     Not on file     Social Determinants of Health     Financial Resource Strain: High Risk (8/29/2024)    Financial Resource Strain      Within the past 12 months, have you or your family members you live with been unable to get utilities (heat, electricity) when it was really needed?: Yes   Food Insecurity: Low Risk  (8/29/2024)    Food Insecurity      Within the past 12 months, did you worry that your food would run out before you got money to buy more?: No      Within the past 12 months, did the food you bought just not last and you didn t have money to get more?: No   Transportation Needs: Low Risk  (8/29/2024)    Transportation Needs      Within the past 12 months, has lack of transportation kept you from medical appointments, getting your medicines, non-medical meetings or appointments, work, or from getting things that you need?: No   Physical Activity: Inactive (8/29/2024)    Exercise Vital Sign      Days of Exercise per Week: 0 days      Minutes of Exercise per Session: 0 min   Stress: No Stress Concern Present (8/29/2024)    [employer] - Occupational Stress Questionnaire      Feeling of Stress : Only a little   Social Connections: Unknown (8/29/2024)    Social Connection and Isolation Panel [NHANES]      Frequency of Communication with Friends and Family: Not on file      Frequency of Social Gatherings with Friends and Family: Once a week      Attends Christianity Services: Not on file      Active Member of Clubs or Organizations: Not on file      Attends Club or Organization Meetings: Not on file      Marital Status: Not on file   Interpersonal Safety: Low Risk  (9/6/2024)    Interpersonal Safety      Do you feel physically and  "emotionally safe where you currently live?: Yes      Within the past 12 months, have you been hit, slapped, kicked or otherwise physically hurt by someone?: No      Within the past 12 months, have you been humiliated or emotionally abused in other ways by your partner or ex-partner?: No   Housing Stability: Low Risk  (8/29/2024)    Housing Stability      Do you have housing? : Yes      Are you worried about losing your housing?: No       Current Outpatient Medications   Medication Sig Dispense Refill     lisinopril (ZESTRIL) 30 MG tablet Take 1 tablet (30 mg) by mouth daily. 90 tablet 1     omeprazole (PRILOSEC) 40 MG DR capsule TAKE ONE CAPSULE BY MOUTH DAILY WITH DINNER 90 capsule 0     VITAMIN D, CHOLECALCIFEROL, PO Take 5,000 Units by mouth daily       oxyCODONE-acetaminophen (PERCOCET) 5-325 MG tablet Take 1-2 tablets by mouth every 4 hours as needed for pain. (Patient not taking: Reported on 9/12/2024) 12 tablet 0       Medications and history reviewed    Physical exam:  Vitals: /72   Pulse 82   Temp 97.7  F (36.5  C) (Temporal)   Ht 1.745 m (5' 8.7\")   Wt 92.1 kg (203 lb)   SpO2 97%   BMI 30.24 kg/m    BMI= Body mass index is 30.24 kg/m .    HEART: RRR, no new murmurs  LUNGS: CTAB, equal chest rise, good effort  ABD: soft, non tender, non distended  INCISIONS: Clean dry intact  EXT: BROWNE, no deformities    PATHOLOGY:  None    Assessment:     ICD-10-CM    1. S/P laparoscopic hernia repair  Z98.890     Z87.19         Plan: Doing fairly well.  Improving as expected.  Restrictions reviewed and questions answered.  Follow-up as needed.    10 minutes spent by me on the date of the encounter doing chart review, history and exam, documentation and further activities per the note    Clayton Gates, DO      Again, thank you for allowing me to participate in the care of your patient.        Sincerely,        Clayton Gates, DO  "

## 2024-09-12 NOTE — PROGRESS NOTES
General Surgery Follow Up    Pt returns for follow up visit s/p robot bilateral inguinal hernia repairs with mesh    HPI:  Doing pretty well.  Still somewhat sore but this is improving.  Initially had troubles urinating defecating but again this is improving and almost back to normal.  He has got a mild rash from the shaving of his skin.  He also has a mild pressure/burning type marks on his arm and upper chest of unknown source.  These are also improving.      Past Medical History:   Diagnosis Date    Diagnostic skin and sensitization tests 9/08 skin tests pos. for cat/dog ONLY (all other environmental allergens NEGATIVE)       Past Surgical History:   Procedure Laterality Date    ESOPHAGOSCOPY, GASTROSCOPY, DUODENOSCOPY (EGD), COMBINED N/A 3/14/2023    Procedure: ESOPHAGOGASTRODUODENOSCOPY (EGD);  Surgeon: Sanchez Franco MD;  Location:  GI    HERNIORRHAPHY, INGUINAL, BILATERAL, ROBOT-ASSISTED, LAPAROSCOPIC, USING DA JOSE MIGUEL XI Bilateral 9/6/2024    Procedure: REPAIR, HERNIA, INGUINAL, BILATERAL, ROBOT-ASSISTED, LAPAROSCOPIC, USING DA JOSE MIGUEL XI WITH MESH.;  Surgeon: Clayton Gates DO;  Location:  OR       Social History     Socioeconomic History    Marital status:      Spouse name: Not on file    Number of children: Not on file    Years of education: Not on file    Highest education level: Not on file   Occupational History    Not on file   Tobacco Use    Smoking status: Former     Current packs/day: 0.50     Average packs/day: 0.5 packs/day for 10.0 years (5.0 ttl pk-yrs)     Types: Cigarettes     Passive exposure: Past    Smokeless tobacco: Never   Vaping Use    Vaping status: Never Used   Substance and Sexual Activity    Alcohol use: No     Comment: very little    Drug use: No    Sexual activity: Not Currently     Partners: Female   Other Topics Concern    Parent/sibling w/ CABG, MI or angioplasty before 65F 55M? Yes     Comment: dad at 53, brother 51   Social History Narrative    Not on file      Social Determinants of Health     Financial Resource Strain: High Risk (8/29/2024)    Financial Resource Strain     Within the past 12 months, have you or your family members you live with been unable to get utilities (heat, electricity) when it was really needed?: Yes   Food Insecurity: Low Risk  (8/29/2024)    Food Insecurity     Within the past 12 months, did you worry that your food would run out before you got money to buy more?: No     Within the past 12 months, did the food you bought just not last and you didn t have money to get more?: No   Transportation Needs: Low Risk  (8/29/2024)    Transportation Needs     Within the past 12 months, has lack of transportation kept you from medical appointments, getting your medicines, non-medical meetings or appointments, work, or from getting things that you need?: No   Physical Activity: Inactive (8/29/2024)    Exercise Vital Sign     Days of Exercise per Week: 0 days     Minutes of Exercise per Session: 0 min   Stress: No Stress Concern Present (8/29/2024)    [employer] [place] [employer] - Occupational Stress Questionnaire     Feeling of Stress : Only a little   Social Connections: Unknown (8/29/2024)    Social Connection and Isolation Panel [NHANES]     Frequency of Communication with Friends and Family: Not on file     Frequency of Social Gatherings with Friends and Family: Once a week     Attends Islam Services: Not on file     Active Member of Clubs or Organizations: Not on file     Attends Club or Organization Meetings: Not on file     Marital Status: Not on file   Interpersonal Safety: Low Risk  (9/6/2024)    Interpersonal Safety     Do you feel physically and emotionally safe where you currently live?: Yes     Within the past 12 months, have you been hit, slapped, kicked or otherwise physically hurt by someone?: No     Within the past 12 months, have you been humiliated or emotionally abused in other ways by your partner or ex-partner?: No  "  Housing Stability: Low Risk  (8/29/2024)    Housing Stability     Do you have housing? : Yes     Are you worried about losing your housing?: No       Current Outpatient Medications   Medication Sig Dispense Refill    lisinopril (ZESTRIL) 30 MG tablet Take 1 tablet (30 mg) by mouth daily. 90 tablet 1    omeprazole (PRILOSEC) 40 MG DR capsule TAKE ONE CAPSULE BY MOUTH DAILY WITH DINNER 90 capsule 0    VITAMIN D, CHOLECALCIFEROL, PO Take 5,000 Units by mouth daily      oxyCODONE-acetaminophen (PERCOCET) 5-325 MG tablet Take 1-2 tablets by mouth every 4 hours as needed for pain. (Patient not taking: Reported on 9/12/2024) 12 tablet 0       Medications and history reviewed    Physical exam:  Vitals: /72   Pulse 82   Temp 97.7  F (36.5  C) (Temporal)   Ht 1.745 m (5' 8.7\")   Wt 92.1 kg (203 lb)   SpO2 97%   BMI 30.24 kg/m    BMI= Body mass index is 30.24 kg/m .    HEART: RRR, no new murmurs  LUNGS: CTAB, equal chest rise, good effort  ABD: soft, non tender, non distended  INCISIONS: Clean dry intact  EXT: BROWNE, no deformities    PATHOLOGY:  None    Assessment:     ICD-10-CM    1. S/P laparoscopic hernia repair  Z98.890     Z87.19         Plan: Doing fairly well.  Improving as expected.  Restrictions reviewed and questions answered.  Follow-up as needed.    10 minutes spent by me on the date of the encounter doing chart review, history and exam, documentation and further activities per the note    Clayton Gates, DO    "

## 2024-10-04 ENCOUNTER — PATIENT OUTREACH (OUTPATIENT)
Dept: CARE COORDINATION | Facility: CLINIC | Age: 69
End: 2024-10-04
Payer: COMMERCIAL

## 2024-10-17 ENCOUNTER — TELEPHONE (OUTPATIENT)
Dept: ORTHOPEDICS | Facility: CLINIC | Age: 69
End: 2024-10-17

## 2024-10-17 ENCOUNTER — OFFICE VISIT (OUTPATIENT)
Dept: ORTHOPEDICS | Facility: CLINIC | Age: 69
End: 2024-10-17
Attending: STUDENT IN AN ORGANIZED HEALTH CARE EDUCATION/TRAINING PROGRAM
Payer: COMMERCIAL

## 2024-10-17 ENCOUNTER — HOSPITAL ENCOUNTER (OUTPATIENT)
Facility: CLINIC | Age: 69
End: 2024-10-17
Attending: ORTHOPAEDIC SURGERY | Admitting: ORTHOPAEDIC SURGERY
Payer: COMMERCIAL

## 2024-10-17 VITALS
HEIGHT: 68 IN | RESPIRATION RATE: 18 BRPM | WEIGHT: 200 LBS | DIASTOLIC BLOOD PRESSURE: 76 MMHG | SYSTOLIC BLOOD PRESSURE: 167 MMHG | HEART RATE: 95 BPM | BODY MASS INDEX: 30.31 KG/M2

## 2024-10-17 DIAGNOSIS — M17.12 PRIMARY OSTEOARTHRITIS OF LEFT KNEE: Primary | ICD-10-CM

## 2024-10-17 PROCEDURE — 99203 OFFICE O/P NEW LOW 30 MIN: CPT | Performed by: ORTHOPAEDIC SURGERY

## 2024-10-17 NOTE — PATIENT INSTRUCTIONS
Brent to follow up with Primary Care provider regarding elevated blood pressure.    Mr. Dugan and I talked about his condition and diagnosis.  Because of severe arthritis, and failure of conservative measures, we have decided to proceed with left  total knee arthroplasty.  I do this in [place].    We have talked in depth about the procedure and the risks, which include, but are not limited to:  * blood loss possibly requiring transfusion,   * blood clots with possible pulmonary embolism  * infection or wound healing problems  * nerve damage - there will always be a bit of numbness just to outside of knee,  * vascular circulation problems  * continued pain  * instability of the knee.  * Loosening or wear  * anesthetic risks  * The possibility of needing additional procedures.      We also talked about recovery time, which differs from patient to patient.    Generally 1 night in the hospital.  Most people can return home at that point.  You will be in Physical Therapy for 1-2 months until you have gained full range of motion.    Preop xrays have been ordered, and medical clearance will need to be obtained.    Preop physical with primary physician is needed within 30 days of the surgery.  Nothing to eat or drink for 8 hours before surgery.  Stop blood thinners 5 days before surgery (aspirin, warfarin, anti-inflammatories).      Surgery schedulers will call you to schedule surgery.  If you don't get a call in the next few days, then call [phone] to schedule for [place].

## 2024-10-17 NOTE — PROGRESS NOTES
HISTORY OF PRESENT ILLNESS    Krishna Dugan is a 69 year old male who is seen in consultation at the request of Dr. Mosquera for evaluation of  left knee pain that has been present approximately 4 years.      Present symptoms: pain medially  and anteriorly, pain sharp, shooting, dull/achy, tightness , moderate pain, severe pain, swelling, severe swelling, no catching/popping, no locking, +giving way.    Symptoms occur walking, kneeling, pivoting, standing for long periods, getting up from seated or lying-down position , sitting for long periods, going up and down stairs, at night, flexing knee, and extending knee.    The symptoms occur are constant.  The symptoms are increasing  Limitations of activities of daily living:  patient has great difficulties with completing household chores due to significant knee pain, he is unable to stand longer than 15 minutes without pain. He has to take one stairs at a time when going down stairs, patient cannot walk greater than 100 feet without pain, he is unable to walk on uneven surfaces due to pain and instability  Fall risk?: Yes.  He has used a single upright hinged brace.    Treatments tried to this point: NSAIDs and Corticosteroid injections  Response to treatment:   NSAIDs: patient uses Tylenol daily to help with his knee symptoms, this only provides him with mild, temporary relief   Glucosamine: Not done   Corticosteroid injections: patient has had approximately 3 cortisone injection over the past 3 years, the last injection in the spring of 2024 provided him with relief for only a few weeks   viscous supplementation injection: Not done   Arthroscopy: Not done   Physical Therapy: Given the patient's severity of symptoms and advanced degenerative changes on xray  physical therapy would be more likely to cause an exacerbation of symptoms and therefor worsen quality of life. I feel physical therapy is contraindicated at this time.        Past Medical History:   Diagnosis  Date    Diagnostic skin and sensitization tests 9/08 skin tests pos. for cat/dog ONLY (all other environmental allergens NEGATIVE)       Past Surgical History:   Procedure Laterality Date    ESOPHAGOSCOPY, GASTROSCOPY, DUODENOSCOPY (EGD), COMBINED N/A 3/14/2023    Procedure: ESOPHAGOGASTRODUODENOSCOPY (EGD);  Surgeon: Sanchez Franco MD;  Location: [facility]    HERNIORRHAPHY, INGUINAL, BILATERAL, ROBOT-ASSISTED, LAPAROSCOPIC, USING DA JOSE MIGUEL XI Bilateral 9/6/2024    Procedure: REPAIR, HERNIA, INGUINAL, BILATERAL, ROBOT-ASSISTED, LAPAROSCOPIC, USING DA JOSE MIGUEL XI WITH MESH.;  Surgeon: Clayton Gates DO;  Location: [facility]       Family History   Problem Relation Age of Onset    Coronary Artery Disease Father 53    Coronary Artery Disease Brother     Coronary Artery Disease Brother     Coronary Artery Disease Brother        Social History     Socioeconomic History    Marital status:      Spouse name: Not on file    Number of children: Not on file    Years of education: Not on file    Highest education level: Not on file   Occupational History    Not on file   Tobacco Use    Smoking status: Former     Current packs/day: 0.50     Average packs/day: 0.5 packs/day for 10.0 years (5.0 ttl pk-yrs)     Types: Cigarettes     Passive exposure: Past    Smokeless tobacco: Never   Vaping Use    Vaping status: Never Used   Substance and Sexual Activity    Alcohol use: No     Comment: very little    Drug use: No    Sexual activity: Not Currently     Partners: Female   Other Topics Concern    Parent/sibling w/ CABG, MI or angioplasty before 65F 55M? Yes     Comment: dad at 53, brother 51   Social History Narrative    Not on file     Social Determinants of Health     Financial Resource Strain: High Risk (8/29/2024)    Financial Resource Strain     Within the past 12 months, have you or your family members you live with been unable to get utilities (heat, electricity) when it was really needed?: Yes   Food Insecurity: Low Risk   (8/29/2024)    Food Insecurity     Within the past 12 months, did you worry that your food would run out before you got money to buy more?: No     Within the past 12 months, did the food you bought just not last and you didn t have money to get more?: No   Transportation Needs: Low Risk  (8/29/2024)    Transportation Needs     Within the past 12 months, has lack of transportation kept you from medical appointments, getting your medicines, non-medical meetings or appointments, work, or from getting things that you need?: No   Physical Activity: Inactive (8/29/2024)    Exercise Vital Sign     Days of Exercise per Week: 0 days     Minutes of Exercise per Session: 0 min   Stress: No Stress Concern Present (8/29/2024)    [employer] [place] [employer] - Occupational Stress Questionnaire     Feeling of Stress : Only a little   Social Connections: Unknown (8/29/2024)    Social Connection and Isolation Panel [NHANES]     Frequency of Communication with Friends and Family: Not on file     Frequency of Social Gatherings with Friends and Family: Once a week     Attends Denominational Services: Not on file     Active Member of Clubs or Organizations: Not on file     Attends Club or Organization Meetings: Not on file     Marital Status: Not on file   Interpersonal Safety: Low Risk  (9/6/2024)    Interpersonal Safety     Do you feel physically and emotionally safe where you currently live?: Yes     Within the past 12 months, have you been hit, slapped, kicked or otherwise physically hurt by someone?: No     Within the past 12 months, have you been humiliated or emotionally abused in other ways by your partner or ex-partner?: No   Housing Stability: Low Risk  (8/29/2024)    Housing Stability     Do you have housing? : Yes     Are you worried about losing your housing?: No       Current Outpatient Medications   Medication Sig Dispense Refill    lisinopril (ZESTRIL) 30 MG tablet Take 1 tablet (30 mg) by mouth daily. 90 tablet  "1    omeprazole (PRILOSEC) 40 MG DR capsule TAKE ONE CAPSULE BY MOUTH DAILY WITH DINNER 90 capsule 0    oxyCODONE-acetaminophen (PERCOCET) 5-325 MG tablet Take 1-2 tablets by mouth every 4 hours as needed for pain. (Patient not taking: Reported on 9/12/2024) 12 tablet 0    VITAMIN D, CHOLECALCIFEROL, PO Take 5,000 Units by mouth daily         Allergies   Allergen Reactions    Statins [Statins] Muscle Pain (Myalgia)    Valdecoxib      Ears turn purple  Facial flushing  Bextra       REVIEW OF SYSTEMS:  CONSTITUTIONAL:  NEGATIVE for fever, chills, change in weight, not feeling tired  SKIN:  NEGATIVE for worrisome rashes, no skin lumps, no skin ulcers and no non-healing wounds  EYES:  NEGATIVE for vision changes or irritation  ENT/MOUTH:  NEGATIVE  No hearing loss, no hoarseness, no difficulty swallowing.  RESP:  NEGATIVE for significant cough or SOB  BREAST:  NEGATIVE for masses, tenderness or discharge  CV:  NEGATIVE for chest pain, palpitations or peripheral edema  GI:  NEGATIVE for nausea, abdominal pain, heartburn, or change in bowel habits  :  Negative   MUSCULOSKELETAL:  See HPI above  NEURO:  NEGATIVE for weakness, dizziness or paresthesias  ENDOCRINE:  NEGATIVE for temperature intolerance, skin/hair changes  HEME/ALLERGY/IMMUNE:  NEGATIVE for bleeding problems  PSYCHIATRIC:  NEGATIVE for changes in mood or affect    PHYSICAL EXAM:  Vitals: BP (!) 167/76   Pulse 95   Resp 18   Ht 1.727 m (5' 8\")   Wt 90.7 kg (200 lb)   BMI 30.41 kg/m    BMI= Body mass index is 30.41 kg/m .  Constitutional:  healthy, alert and no distress  Neuro: Alert and Oriented x 3, Sensation grossly WNL.  HEENT:  Atraumatic, EOMI  Neck:  Neck supple with no tenderness.  Psych: Affect normal   Respiratory: Breathing not labored.  Cardiovascular: normal peripheral pulses  Lymph: no adenopathy  Skin: No rashes,worrisome lesions or skin problems  Spine: straight, no straight leg raising pain.  Hips show full range of motion.  There is no " tenderness over the sacro-iliac joints, sciatic notch, or greater trochanters.     KNEE EXAM:   Gait: walks with antalgic gait favoring left side   Alignment: Right: mild varus, Left:  moderate varus  ROM: Right knee: 0 extension to 130 flexion, Left knee: 0 extension to 110 flexion  Effusion: Right: none, Left: mild  Tender: Right: medial joint line, Left: medial joint line and medial facet of the patella  Ligaments:  Lachman's Stable, Anterior and posterior drawer stable.  Right medial collateral ligament:  no laxity,  Lateral collateral ligament  no laxity.    Left medial collateral ligament:  no laxity, lateral collateral ligament:  no laxity.   moderate pain with Varus/Valgus stress testing.    Patellofemoral joint: moderate crepitations in the left patellofemoral joint.    Apprehension: negative      X-RAY:  From today 3/5/2024: shows severe medial joint space narrowing  There is bone-on-bone medially  by x-ray.       Impression: Left Knee: Osteoarthritis severe.    Plan: Total Knee Arthroplasty: We talked about the patient's condition and diagnosis.  Because of severe arthritis, and failure of conservative measures, I have suggested total knee arthroplasty of the left knee(s).  I've talked in depth about the procedure and the risks, which include, but are not limited to blood loss requiring transfusion, infection, neurovascular injury, DVT, PE, continued pain, (both perioperative and persistent post-recovery pain), numbness around the wound, instability, and potential anesthetic and perioperative medical complications, and the possibility of needing additional procedures. We also talked about recovery time, which differs from patient to patient.    Medical clearance will need to be obtained.      Special considerations: cemented Persona.

## 2024-10-17 NOTE — LETTER
10/17/2024      Krishna Dugan  [id number] [address] [place] [id number]      Dear Colleague,    Thank you for referring your patient, Krishna Dugan, to the [facility]. Please see a copy of my visit note below.    HISTORY OF PRESENT ILLNESS    Krishna Dugan is a 69 year old male who is seen in consultation at the request of Dr. Mosquera for evaluation of  left knee pain that has been present approximately 4 years.      Present symptoms: pain medially  and anteriorly, pain sharp, shooting, dull/achy, tightness , moderate pain, severe pain, swelling, severe swelling, no catching/popping, no locking, +giving way.    Symptoms occur walking, kneeling, pivoting, standing for long periods, getting up from seated or lying-down position , sitting for long periods, going up and down stairs, at night, flexing knee, and extending knee.    The symptoms occur are constant.  The symptoms are increasing  Limitations of activities of daily living:  patient has great difficulties with completing household chores due to significant knee pain, he is unable to stand longer than 15 minutes without pain. He has to take one stairs at a time when going down stairs, patient cannot walk greater than 100 feet without pain, he is unable to walk on uneven surfaces due to pain and instability  Fall risk?: Yes.  He has used a single upright hinged brace.    Treatments tried to this point: NSAIDs and Corticosteroid injections  Response to treatment:   NSAIDs: patient uses Tylenol daily to help with his knee symptoms, this only provides him with mild, temporary relief   Glucosamine: Not done   Corticosteroid injections: patient has had approximately 3 cortisone injection over the past 3 years, the last injection in the spring of 2024 provided him with relief for only a few weeks   viscous supplementation injection: Not done   Arthroscopy: Not done   Physical Therapy: Given the patient's severity of symptoms and  advanced degenerative changes on xray  physical therapy would be more likely to cause an exacerbation of symptoms and therefor worsen quality of life. I feel physical therapy is contraindicated at this time.        Past Medical History:   Diagnosis Date     Diagnostic skin and sensitization tests 9/08 skin tests pos. for cat/dog ONLY (all other environmental allergens NEGATIVE)       Past Surgical History:   Procedure Laterality Date     ESOPHAGOSCOPY, GASTROSCOPY, DUODENOSCOPY (EGD), COMBINED N/A 3/14/2023    Procedure: ESOPHAGOGASTRODUODENOSCOPY (EGD);  Surgeon: Sanchez Franco MD;  Location: [facility]     HERNIORRHAPHY, INGUINAL, BILATERAL, ROBOT-ASSISTED, LAPAROSCOPIC, USING DA JOSE MIGUEL XI Bilateral 9/6/2024    Procedure: REPAIR, HERNIA, INGUINAL, BILATERAL, ROBOT-ASSISTED, LAPAROSCOPIC, USING DA JOSE MIGUEL XI WITH MESH.;  Surgeon: Clayton Gates DO;  Location: [facility]       Family History   Problem Relation Age of Onset     Coronary Artery Disease Father 53     Coronary Artery Disease Brother      Coronary Artery Disease Brother      Coronary Artery Disease Brother        Social History     Socioeconomic History     Marital status:      Spouse name: Not on file     Number of children: Not on file     Years of education: Not on file     Highest education level: Not on file   Occupational History     Not on file   Tobacco Use     Smoking status: Former     Current packs/day: 0.50     Average packs/day: 0.5 packs/day for 10.0 years (5.0 ttl pk-yrs)     Types: Cigarettes     Passive exposure: Past     Smokeless tobacco: Never   Vaping Use     Vaping status: Never Used   Substance and Sexual Activity     Alcohol use: No     Comment: very little     Drug use: No     Sexual activity: Not Currently     Partners: Female   Other Topics Concern     Parent/sibling w/ CABG, MI or angioplasty before 65F 55M? Yes     Comment: dad at 53, brother 51   Social History Narrative     Not on file     Social Determinants of  Health     Financial Resource Strain: High Risk (8/29/2024)    Financial Resource Strain      Within the past 12 months, have you or your family members you live with been unable to get utilities (heat, electricity) when it was really needed?: Yes   Food Insecurity: Low Risk  (8/29/2024)    Food Insecurity      Within the past 12 months, did you worry that your food would run out before you got money to buy more?: No      Within the past 12 months, did the food you bought just not last and you didn t have money to get more?: No   Transportation Needs: Low Risk  (8/29/2024)    Transportation Needs      Within the past 12 months, has lack of transportation kept you from medical appointments, getting your medicines, non-medical meetings or appointments, work, or from getting things that you need?: No   Physical Activity: Inactive (8/29/2024)    Exercise Vital Sign      Days of Exercise per Week: 0 days      Minutes of Exercise per Session: 0 min   Stress: No Stress Concern Present (8/29/2024)    [employer] - Occupational Stress Questionnaire      Feeling of Stress : Only a little   Social Connections: Unknown (8/29/2024)    Social Connection and Isolation Panel [NHANES]      Frequency of Communication with Friends and Family: Not on file      Frequency of Social Gatherings with Friends and Family: Once a week      Attends Restorationist Services: Not on file      Active Member of Clubs or Organizations: Not on file      Attends Club or Organization Meetings: Not on file      Marital Status: Not on file   Interpersonal Safety: Low Risk  (9/6/2024)    Interpersonal Safety      Do you feel physically and emotionally safe where you currently live?: Yes      Within the past 12 months, have you been hit, slapped, kicked or otherwise physically hurt by someone?: No      Within the past 12 months, have you been humiliated or emotionally abused in other ways by your partner or ex-partner?: No   Housing  "Stability: Low Risk  (8/29/2024)    Housing Stability      Do you have housing? : Yes      Are you worried about losing your housing?: No       Current Outpatient Medications   Medication Sig Dispense Refill     lisinopril (ZESTRIL) 30 MG tablet Take 1 tablet (30 mg) by mouth daily. 90 tablet 1     omeprazole (PRILOSEC) 40 MG DR capsule TAKE ONE CAPSULE BY MOUTH DAILY WITH DINNER 90 capsule 0     oxyCODONE-acetaminophen (PERCOCET) 5-325 MG tablet Take 1-2 tablets by mouth every 4 hours as needed for pain. (Patient not taking: Reported on 9/12/2024) 12 tablet 0     VITAMIN D, CHOLECALCIFEROL, PO Take 5,000 Units by mouth daily         Allergies   Allergen Reactions     Statins [Statins] Muscle Pain (Myalgia)     Valdecoxib      Ears turn purple  Facial flushing  Bextra       REVIEW OF SYSTEMS:  CONSTITUTIONAL:  NEGATIVE for fever, chills, change in weight, not feeling tired  SKIN:  NEGATIVE for worrisome rashes, no skin lumps, no skin ulcers and no non-healing wounds  EYES:  NEGATIVE for vision changes or irritation  ENT/MOUTH:  NEGATIVE  No hearing loss, no hoarseness, no difficulty swallowing.  RESP:  NEGATIVE for significant cough or SOB  BREAST:  NEGATIVE for masses, tenderness or discharge  CV:  NEGATIVE for chest pain, palpitations or peripheral edema  GI:  NEGATIVE for nausea, abdominal pain, heartburn, or change in bowel habits  :  Negative   MUSCULOSKELETAL:  See HPI above  NEURO:  NEGATIVE for weakness, dizziness or paresthesias  ENDOCRINE:  NEGATIVE for temperature intolerance, skin/hair changes  HEME/ALLERGY/IMMUNE:  NEGATIVE for bleeding problems  PSYCHIATRIC:  NEGATIVE for changes in mood or affect    PHYSICAL EXAM:  Vitals: BP (!) 167/76   Pulse 95   Resp 18   Ht 1.727 m (5' 8\")   Wt 90.7 kg (200 lb)   BMI 30.41 kg/m    BMI= Body mass index is 30.41 kg/m .  Constitutional:  healthy, alert and no distress  Neuro: Alert and Oriented x 3, Sensation grossly WNL.  HEENT:  Atraumatic, EOMI  Neck:  " Neck supple with no tenderness.  Psych: Affect normal   Respiratory: Breathing not labored.  Cardiovascular: normal peripheral pulses  Lymph: no adenopathy  Skin: No rashes,worrisome lesions or skin problems  Spine: straight, no straight leg raising pain.  Hips show full range of motion.  There is no tenderness over the sacro-iliac joints, sciatic notch, or greater trochanters.     KNEE EXAM:   Gait: walks with antalgic gait favoring left side   Alignment: Right: mild varus, Left:  moderate varus  ROM: Right knee: 0 extension to 130 flexion, Left knee: 0 extension to 110 flexion  Effusion: Right: none, Left: mild  Tender: Right: medial joint line, Left: medial joint line and medial facet of the patella  Ligaments:  Lachman's Stable, Anterior and posterior drawer stable.  Right medial collateral ligament:  no laxity,  Lateral collateral ligament  no laxity.    Left medial collateral ligament:  no laxity, lateral collateral ligament:  no laxity.   moderate pain with Varus/Valgus stress testing.    Patellofemoral joint: moderate crepitations in the left patellofemoral joint.    Apprehension: negative      X-RAY:  From today 3/5/2024: shows severe medial joint space narrowing  There is bone-on-bone medially  by x-ray.       Impression: Left Knee: Osteoarthritis severe.    Plan: Total Knee Arthroplasty: We talked about the patient's condition and diagnosis.  Because of severe arthritis, and failure of conservative measures, I have suggested total knee arthroplasty of the left knee(s).  I've talked in depth about the procedure and the risks, which include, but are not limited to blood loss requiring transfusion, infection, neurovascular injury, DVT, PE, continued pain, (both perioperative and persistent post-recovery pain), numbness around the wound, instability, and potential anesthetic and perioperative medical complications, and the possibility of needing additional procedures. We also talked about recovery time, which  differs from patient to patient.    Medical clearance will need to be obtained.      Special considerations: cemented Persona.           Again, thank you for allowing me to participate in the care of your patient.        Sincerely,        Tanner Goetz MD

## 2024-10-17 NOTE — TELEPHONE ENCOUNTER
Type of surgery: ARTHROPLASTY, KNEE, TOTAL (Left)   Location of surgery: [facility]  Date and time of surgery: 12/4  Surgeon: Sofy  Pre-Op Appt Date: 11/8  Post-Op Appt Date: 12/19   Packet sent out: Yes  Pre-cert/Authorization completed:  Not Applicable  Date: na

## 2024-11-01 DIAGNOSIS — K44.9 HIATAL HERNIA: ICD-10-CM

## 2024-11-04 RX ORDER — OMEPRAZOLE 40 MG/1
CAPSULE, DELAYED RELEASE ORAL
Qty: 90 CAPSULE | Refills: 1 | Status: SHIPPED | OUTPATIENT
Start: 2024-11-04

## 2024-11-08 ENCOUNTER — OFFICE VISIT (OUTPATIENT)
Dept: FAMILY MEDICINE | Facility: CLINIC | Age: 69
End: 2024-11-08
Payer: COMMERCIAL

## 2024-11-08 VITALS
OXYGEN SATURATION: 98 % | RESPIRATION RATE: 19 BRPM | HEIGHT: 68 IN | TEMPERATURE: 98.8 F | SYSTOLIC BLOOD PRESSURE: 160 MMHG | DIASTOLIC BLOOD PRESSURE: 78 MMHG | BODY MASS INDEX: 31.22 KG/M2 | HEART RATE: 84 BPM | WEIGHT: 206 LBS

## 2024-11-08 DIAGNOSIS — M17.12 PRIMARY OSTEOARTHRITIS OF LEFT KNEE: ICD-10-CM

## 2024-11-08 DIAGNOSIS — E78.5 HYPERLIPIDEMIA LDL GOAL <100: ICD-10-CM

## 2024-11-08 DIAGNOSIS — I10 ESSENTIAL HYPERTENSION, BENIGN: ICD-10-CM

## 2024-11-08 DIAGNOSIS — Z82.49 FAMILY HISTORY OF ISCHEMIC HEART DISEASE: ICD-10-CM

## 2024-11-08 DIAGNOSIS — Z01.818 PREOP GENERAL PHYSICAL EXAM: Primary | ICD-10-CM

## 2024-11-08 DIAGNOSIS — M54.6 RIGHT-SIDED THORACIC BACK PAIN, UNSPECIFIED CHRONICITY: ICD-10-CM

## 2024-11-08 PROCEDURE — 99214 OFFICE O/P EST MOD 30 MIN: CPT | Mod: 25 | Performed by: PHYSICIAN ASSISTANT

## 2024-11-08 PROCEDURE — 90471 IMMUNIZATION ADMIN: CPT | Performed by: PHYSICIAN ASSISTANT

## 2024-11-08 PROCEDURE — 90480 ADMN SARSCOV2 VAC 1/ONLY CMP: CPT | Performed by: PHYSICIAN ASSISTANT

## 2024-11-08 PROCEDURE — 91320 SARSCV2 VAC 30MCG TRS-SUC IM: CPT | Performed by: PHYSICIAN ASSISTANT

## 2024-11-08 PROCEDURE — 90662 IIV NO PRSV INCREASED AG IM: CPT | Performed by: PHYSICIAN ASSISTANT

## 2024-11-08 RX ORDER — METHOCARBAMOL 500 MG/1
500 TABLET, FILM COATED ORAL 4 TIMES DAILY PRN
Qty: 60 TABLET | Refills: 0 | Status: SHIPPED | OUTPATIENT
Start: 2024-11-08

## 2024-11-08 RX ORDER — EZETIMIBE 10 MG/1
10 TABLET ORAL DAILY
Qty: 90 TABLET | Refills: 1 | Status: SHIPPED | OUTPATIENT
Start: 2024-11-08

## 2024-11-08 NOTE — PATIENT INSTRUCTIONS
How to Take Your Medication Before Surgery  Preoperative Medication Instructions   Antiplatelet or Anticoagulation Medication Instructions   - Patient is on no antiplatelet or anticoagulation medications.    Additional Medication Instructions  Take all scheduled medications on the day of surgery EXCEPT for modifications listed below:   - ACE/ARB: DO NOT TAKE on day of surgery (minimum 11 hours for general anesthesia).  Lisinopril        Patient Education   Preparing for Your Surgery  For Adults  Getting started  In most cases, a nurse will call to review your health history and instructions. They will give you an arrival time based on your scheduled surgery time. Please be ready to share:  Your doctor's clinic name and phone number  Your medical, surgical, and anesthesia history  A list of allergies and sensitivities  A list of medicines, including herbal treatments and over-the-counter drugs  Whether the patient has a legal guardian (ask how to send us the papers in advance)  Note: You may not receive a call if you were seen at our PAC (Preoperative Assessment Center).  Please tell us if you're pregnant--or if there's any chance you might be pregnant. Some surgeries may injure a fetus (unborn baby), so they require a pregnancy test. Surgeries that are safe for a fetus don't always need a test, and you can choose whether to have one.   Preparing for surgery  Within 10 to 30 days of surgery: Have a pre-op exam (sometimes called an H&P, or History and Physical). This can be done at a clinic or pre-operative center.  If you're having a , you may not need this exam. Talk to your care team.  At your pre-op exam, talk to your care team about all medicines you take. (This includes CBD oil and any drugs, such as THC, marijuana, and other forms of cannabis.) If you need to stop any medicine before surgery, ask when to start taking it again.  This is for your safety. Many medicines and drugs can make you bleed too  much during surgery. Some change how well surgery (anesthesia) drugs work.  Call your insurance company to let them know you're having surgery. (If you don't have insurance, call 797-800-3123.)  Call your clinic if there's any change in your health. This includes a scrape or scratch near the surgery site, or any signs of a cold (sore throat, runny nose, cough, rash, fever).  Eating and drinking guidelines  For your safety: Unless your surgeon tells you otherwise, follow the guidelines below.  Eat and drink as normal until 8 hours before you arrive for surgery. After that, no food or milk. You can spit out gum when you arrive.  Drink clear liquids until 2 hours before you arrive. These are liquids you can see through, like water, Gatorade, and Propel Water. They also include plain black coffee and tea (no cream or milk).  No alcohol for 24 hours before you arrive. The night before surgery, stop any drinks that contain THC.  If your care team tells you to take medicine on the morning of surgery, it's okay to take it with a sip of water. No other medicines or drugs are allowed (including CBD oil)--follow your care team's instructions.  If you have questions the day of surgery, call your hospital or surgery center.   Preventing infection  Shower or bathe the night before and the morning of surgery. Follow the instructions your clinic gave you. (If no instructions, use regular soap.)  Don't shave or clip hair near your surgery site. We'll remove the hair if needed.  Don't smoke or vape the morning of surgery. No chewing tobacco for 6 hours before you arrive. A nicotine patch is okay. You may spit out nicotine gum when you arrive.  For some surgeries, the surgeon will tell you to fully quit smoking and nicotine.  We will make every effort to keep you safe from infection. We will:  Clean our hands often with soap and water (or an alcohol-based hand rub).  Clean the skin at your surgery site with a special soap that kills  germs.  Give you a special gown to keep you warm. (Cold raises the risk of infection.)  Wear hair covers, masks, gowns, and gloves during surgery.  Give antibiotic medicine, if prescribed. Not all surgeries need this medicine.  What to bring on the day of surgery  Photo ID and insurance card  Copy of your health care directive, if you have one  Glasses and hearing aids (bring cases)  You can't wear contacts during surgery  Inhaler and eye drops, if you use them (tell us about these when you arrive)  CPAP machine or breathing device, if you use them  A few personal items, if spending the night  If you have . . .  A pacemaker, ICD (cardiac defibrillator), or other implant: Bring the ID card.  An implanted stimulator: Bring the remote control.  A legal guardian: Bring a copy of the certified (court-stamped) guardianship papers.  Please remove any jewelry, including body piercings. Leave jewelry and other valuables at home.  If you're going home the day of surgery  You must have a responsible adult drive you home. They should stay with you overnight as well.  If you don't have someone to stay with you, and you aren't safe to go home alone, we may keep you overnight. Insurance often won't pay for this.  After surgery  If it's hard to control your pain or you need more pain medicine, please call your surgeon's office.  Questions?   If you have any questions for your care team, list them here:   ____________________________________________________________________________________________________________________________________________________________________________________________________________________________________________________________  For informational purposes only. Not to replace the advice of your health care provider. Copyright   2003, 2019 [facility]. All rights reserved. Clinically reviewed by Carlos Jaime MD. [employer] [id number] - REV 08/24.

## 2024-11-08 NOTE — PROGRESS NOTES
Preoperative Evaluation  [facility]  [address]  [place] [address]  Phone: [phone]  Primary Provider: Fay Cuellar PA-C  Pre-op Performing Provider: Fay Cuellar PA-C  Nov 8, 2024 11/4/2024   Surgical Information   What procedure is being done? Left knee surgery    Facility or Hospital where procedure/surgery will be performed: [facility]    Who is doing the procedure / surgery? Tanner Costa    Date of surgery / procedure: 12/ 04/ 24    Time of surgery / procedure: I think some time around 1pm    Where do you plan to recover after surgery? at home with family        Patient-reported     Fax number for surgical facility: Note does not need to be faxed, will be available electronically in Epic.    Having right sided back and chest pain.  Muscle relaxants and PT helped.  Its been about 2 years since.  No new triggers but gait has been affected due to his knee pain.     Assessment & Plan     The proposed surgical procedure is considered INTERMEDIATE risk.    Preop general physical exam  No concerns noted for surgery     Essential hypertension, benign  Not at goal today but has been in past.  He is nervous for surgery.  No changes     Primary osteoarthritis of left knee      Family history of ischemic heart disease  He is willing to start zetia.  Follow up as needed after surgery   - REFERRAL TO [facility] FOR CARDIOVASCULAR DISEASE PREVN  - ezetimibe (ZETIA) 10 MG tablet; Take 1 tablet (10 mg) by mouth daily.    Right-sided thoracic back pain, unspecified chronicity  Restart muscle relaxant.  If not improving after surgery restart PT  - methocarbamol (ROBAXIN) 500 MG tablet; Take 1 tablet (500 mg) by mouth 4 times daily as needed for muscle spasms.    Hyperlipidemia LDL goal <100  As above  - ezetimibe (ZETIA) 10 MG tablet; Take 1 tablet (10 mg) by mouth daily.      Possible Sleep Apnea: not addressed            - No identified  additional risk factors other than previously addressed    Preoperative Medication Instructions  Antiplatelet or Anticoagulation Medication Instructions   - Patient is on no antiplatelet or anticoagulation medications.    Additional Medication Instructions  Take all scheduled medications on the day of surgery EXCEPT for modifications listed below:   - ACE/ARB: DO NOT TAKE on day of surgery (minimum 11 hours for general anesthesia).  Lisinopril     Recommendation  Approval given to proceed with proposed procedure, without further diagnostic evaluation.    Anibal Kennedy is a 69 year old, presenting for the following:  Pre-Op Exam          11/8/2024    10:00 AM   Additional Questions   Roomed by Chelo   Accompanied by self     HPI related to upcoming procedure: left knee arthritis         11/4/2024   Pre-Op Questionnaire   Have you ever had a heart attack or stroke? No    Have you ever had surgery on your heart or blood vessels, such as a stent placement, a coronary artery bypass, or surgery on an artery in your head, neck, heart, or legs? No    Do you have chest pain with activity? No    Do you have a history of heart failure? No    Do you currently have a cold, bronchitis or symptoms of other infection? No    Do you have a cough, shortness of breath, or wheezing? No    Do you or anyone in your family have previous history of blood clots? No    Do you or does anyone in your family have a serious bleeding problem such as prolonged bleeding following surgeries or cuts? No    Have you ever had problems with anemia or been told to take iron pills? No    Have you had any abnormal blood loss such as black, tarry or bloody stools? (!) YES -in past,  no blood in stool  now     Have you ever had a blood transfusion? No    Are you willing to have a blood transfusion if it is medically needed before, during, or after your surgery? Yes    Have you or any of your relatives ever had problems with anesthesia? No    Do you have  sleep apnea, excessive snoring or daytime drowsiness? (!) UNKNOWN    Do you have any artifical heart valves or other implanted medical devices like a pacemaker, defibrillator, or continuous glucose monitor? No    Do you have artificial joints? No    Are you allergic to latex? No        Patient-reported     Health Care Directive  Patient does not have a Health Care Directive: Discussed advance care planning with patient; however, patient declined at this time.    Preoperative Review of    reviewed - no record of controlled substances prescribed.          Patient Active Problem List    Diagnosis Date Noted    Primary osteoarthritis of left knee 10/17/2024     Priority: Medium    Prediabetes 12/07/2022     Priority: Medium    S/P rotator cuff repair 06/01/2021     Priority: Medium    Essential tremor 03/15/2021     Priority: Medium    Sexual dysfunction 09/02/2020     Priority: Medium    Other testicular hypofunction 03/15/2019     Priority: Medium    Vitamin D deficiency 03/15/2019     Priority: Medium    Allergic rhinitis due to animal dander 03/06/2018     Priority: Medium    Allergic rhinitis due to dust mite 03/06/2018     Priority: Medium    Essential hypertension, benign 02/05/2018     Priority: Medium    Hyperlipidemia 02/05/2018     Priority: Medium     Overview:   HYPERLIPIDEMIA NEC/NOS      S/P colonoscopic polypectomy 02/05/2018     Priority: Medium    Gastroesophageal reflux disease without esophagitis 12/09/2015     Priority: Medium      Past Medical History:   Diagnosis Date    Diagnostic skin and sensitization tests 9/08 skin tests pos. for cat/dog ONLY (all other environmental allergens NEGATIVE)     Past Surgical History:   Procedure Laterality Date    ESOPHAGOSCOPY, GASTROSCOPY, DUODENOSCOPY (EGD), COMBINED N/A 3/14/2023    Procedure: ESOPHAGOGASTRODUODENOSCOPY (EGD);  Surgeon: Sanchez Franco MD;  Location: [facility]    HERNIORRHAPHY, INGUINAL, BILATERAL, ROBOT-ASSISTED, LAPAROSCOPIC, USING DA  "JOSE MIGUEL XI Bilateral 9/6/2024    Procedure: REPAIR, HERNIA, INGUINAL, BILATERAL, ROBOT-ASSISTED, LAPAROSCOPIC, USING DA JOSE MIGUEL XI WITH MESH.;  Surgeon: Clayton Gates DO;  Location: [facility]     Current Outpatient Medications   Medication Sig Dispense Refill    lisinopril (ZESTRIL) 30 MG tablet Take 1 tablet (30 mg) by mouth daily. 90 tablet 1    omeprazole (PRILOSEC) 40 MG DR capsule TAKE ONE CAPSULE BY MOUTH DAILY WITH DINNER 90 capsule 1    VITAMIN D, CHOLECALCIFEROL, PO Take 5,000 Units by mouth daily         Allergies   Allergen Reactions    Statins [Statins] Muscle Pain (Myalgia)    Valdecoxib      Ears turn purple  Facial flushing  Bextra        Social History     Tobacco Use    Smoking status: Former     Current packs/day: 0.50     Average packs/day: 0.5 packs/day for 10.0 years (5.0 ttl pk-yrs)     Types: Cigarettes     Passive exposure: Past    Smokeless tobacco: Never   Substance Use Topics    Alcohol use: No     Comment: very little       History   Drug Use No             Review of Systems  CONSTITUTIONAL: NEGATIVE for fever, chills, change in weight  INTEGUMENTARY/SKIN: NEGATIVE for worrisome rashes, moles or lesions  ENT/MOUTH: NEGATIVE for ear, mouth and throat problems  RESP: NEGATIVE for significant cough or SOB  CV: NEGATIVE for chest pain, palpitations or peripheral edema  GI: NEGATIVE for nausea, abdominal pain, heartburn, or change in bowel habits  : NEGATIVE for frequency, dysuria, or hematuria  MUSCULOSKELETAL:as above  NEURO: NEGATIVE for weakness, dizziness or paresthesias  PSYCHIATRIC: NEGATIVE for changes in mood or affect    Objective    BP (!) 147/82   Pulse 84   Temp 98.8  F (37.1  C) (Temporal)   Resp 19   Ht 1.727 m (5' 8\")   Wt 93.4 kg (206 lb)   SpO2 98%   BMI 31.32 kg/m     Estimated body mass index is 31.32 kg/m  as calculated from the following:    Height as of this encounter: 1.727 m (5' 8\").    Weight as of this encounter: 93.4 kg (206 lb).  Physical Exam  GENERAL: " alert and no distress  HENT: ear canals and TM's normal, nose and mouth without ulcers or lesions  NECK: no adenopathy, no asymmetry, masses, or scars  RESP: lungs clear to auscultation - no rales, rhonchi or wheezes  CV: regular rate and rhythm, normal S1 S2, no S3 or S4, no murmur, click or rub, no peripheral edema  ABDOMEN: soft, nontender, no hepatosplenomegaly, no masses and bowel sounds normal  MS: right mid back pain just lateral of spine  NEURO: Normal strength and tone, mentation intact and speech normal  PSYCH: mentation appears normal, affect normal/bright    Recent Labs   Lab Test 09/03/24  1056      POTASSIUM 5.1   CR 1.08        Diagnostics  No labs were ordered during this visit.   No EKG required, no history of coronary heart disease, significant arrhythmia, peripheral arterial disease or other structural heart disease.    Revised Cardiac Risk Index (RCRI)  The patient has the following serious cardiovascular risks for perioperative complications:   - No serious cardiac risks = 0 points     RCRI Interpretation: 0 points: Class I (very low risk - 0.4% complication rate)         Signed Electronically by: Fay Cuellar PA-C  A copy of this evaluation report is provided to the requesting physician.

## 2024-11-19 NOTE — PROGRESS NOTES
Ortho Navigator Note      Pre-op Date 11/8/24     Medical Clearance  CLEARED     Labs Hgb last checked 7/17/2023, message sent to surgeon to see if he wants this re-drawn prior to surgery.     COVID Test Date No longer indicated      Skin  Intact      Activity: Ambulates independently      Equipment Need Patient will likely need a walker for discharge. Defer to PT/OT for recs.       Meds to Hold Held all supplements 14 days prior to surgery  Preoperative Medication Instructions   Antiplatelet or Anticoagulation Medication Instructions   - Patient is on no antiplatelet or anticoagulation medications.     Additional Medication Instructions  Take all scheduled medications on the day of surgery EXCEPT for modifications listed below:   - ACE/ARB: DO NOT TAKE on day of surgery (minimum 11 hours for general anesthesia).  Lisinopril     * Medication recommendations are not intended to be exhaustive; they are limited to common medications that are potentially dangerous if incorrectly managed   NPO Instructions  Instructed to stop solids 8 hr prior to arrival and clears 2 hr prior to arrival.       Pre-op Joint Education Complete? Link to zoom class sent via [employer]    Discharge Plan Patient has plan to discharge home on morning of POD 1.   Spouse Diana will arrive at hospital at 0800 to participate in therapy and discharge education. They will then transport patient home    /Transportation Spouse Diana will be  and transportation.  is physically able to care for patient.      Barriers to Discharge No barriers to discharge.      Additional Info/   Special Needs : -Possible ZAIRA, no sleep study  -Hernia repair 9/6/24, reports he cannot lay flat. - message sent to surgeon/anesthesia        Post Op PT Scheduled - first appt. 12/9 11/19/24 1108   Discharge Planning   Patient/Family Anticipates Transition to home with family   Living Arrangements   People in Home spouse   Type of Residence Private Residence    Is your private residence a single family home or apartment? Single family home   Number of Stairs, Within Home, Primary three   Stair Railings, Within Home, Primary none   Once home, are you able to live on one level? Yes   Which level? Main Level   Bathroom Shower/Tub Tub/Shower unit   Equipment Currently Used at Home none   Support System   Support Systems Spouse/Significant Other   Do you have someone available to stay with you one or two nights once you are home? Yes   Medical Clearance   Date of Physical 11/08/24   [facility]   It is recommended that you call and check with any specialty providers before surgery to see if you need surgical clearance.  Do you see any specialty providers outside of your primary care provider? No   Blood   Known Bleeding Disorder or Coagulopathy No   Does the patient have any Advent/cultural preferences related to blood products? No   Education   Has the patient scheduled or completed pre-op total joint education, either in class or online, in the last 12 months? No   Patient attended total joint pre-op class/received pre-op teaching  email/phone call   Relationship/Environment   Name(s) of People in Home Ana Ortiz

## 2024-11-25 RX ORDER — TRIAMCINOLONE ACETONIDE 1 MG/G
CREAM TOPICAL
COMMUNITY
Start: 2024-09-16 | End: 2024-12-04 | Stop reason: HOSPADM

## 2024-11-25 RX ORDER — CETIRIZINE HYDROCHLORIDE 10 MG/1
1 TABLET ORAL
COMMUNITY
Start: 2024-11-16 | End: 2024-12-04 | Stop reason: HOSPADM

## 2024-11-29 PROBLEM — Z96.652 HISTORY OF TOTAL LEFT KNEE REPLACEMENT: Status: ACTIVE | Noted: 2024-11-29

## 2024-11-29 RX ORDER — TRANEXAMIC ACID 650 MG/1
1950 TABLET ORAL ONCE
Status: CANCELLED | OUTPATIENT
Start: 2024-11-29 | End: 2024-11-29

## 2024-11-29 RX ORDER — CEFAZOLIN SODIUM/WATER 2 G/20 ML
2 SYRINGE (ML) INTRAVENOUS SEE ADMIN INSTRUCTIONS
Status: CANCELLED | OUTPATIENT
Start: 2024-11-29

## 2024-11-29 RX ORDER — ACETAMINOPHEN 325 MG/1
975 TABLET ORAL ONCE
Status: CANCELLED | OUTPATIENT
Start: 2024-11-29 | End: 2024-11-29

## 2024-11-29 RX ORDER — CELECOXIB 100 MG/1
400 CAPSULE ORAL ONCE
Status: CANCELLED | OUTPATIENT
Start: 2024-11-29 | End: 2024-11-29

## 2024-11-29 RX ORDER — OXYCODONE HCL 10 MG/1
10 TABLET, FILM COATED, EXTENDED RELEASE ORAL EVERY 8 HOURS
Status: CANCELLED | OUTPATIENT
Start: 2024-11-29 | End: 2024-11-29

## 2024-11-29 RX ORDER — CEFAZOLIN SODIUM/WATER 2 G/20 ML
2 SYRINGE (ML) INTRAVENOUS
Status: CANCELLED | OUTPATIENT
Start: 2024-11-29

## 2024-12-02 ENCOUNTER — MYC MEDICAL ADVICE (OUTPATIENT)
Dept: ORTHOPEDICS | Facility: CLINIC | Age: 69
End: 2024-12-02
Payer: COMMERCIAL

## 2024-12-03 NOTE — TELEPHONE ENCOUNTER
Patient has a dental appointment today at 4pm to check for an abscess. Patient will call clinic and update after appointment and a message should be sent to Dr. Goetz as an update. If this is a tooth abscess, patient will need to postpone his knee surgery that is scheduled for tomorrow 12/4/24. If no abscess, per Dr. Goetz - okay to proceed with surgery.     Linda Mullins RN   [facility]

## 2024-12-04 NOTE — TELEPHONE ENCOUNTER
Patient stated 2 teeth needed to be pulled tonight and a small infection was found under the 2 teeth. Surgeon and OR team updated. Left TKA will need to be postponed at this time per Dr. Goetz.     Nothing further needed at this time.     Linda Mullins RN   [facility]

## 2024-12-29 ENCOUNTER — HEALTH MAINTENANCE LETTER (OUTPATIENT)
Age: 69
End: 2024-12-29

## 2025-04-17 ENCOUNTER — ANCILLARY PROCEDURE (OUTPATIENT)
Dept: ULTRASOUND IMAGING | Facility: OTHER | Age: 70
End: 2025-04-17
Attending: PHYSICIAN ASSISTANT
Payer: COMMERCIAL

## 2025-04-17 DIAGNOSIS — R10.13 ABDOMINAL PAIN, EPIGASTRIC: ICD-10-CM

## 2025-05-06 ENCOUNTER — VIRTUAL VISIT (OUTPATIENT)
Dept: URGENT CARE | Facility: CLINIC | Age: 70
End: 2025-05-06
Payer: COMMERCIAL

## 2025-05-06 DIAGNOSIS — J32.9 OTHER SINUSITIS, UNSPECIFIED CHRONICITY: Primary | ICD-10-CM

## 2025-05-06 PROCEDURE — 98005 SYNCH AUDIO-VIDEO EST LOW 20: CPT

## 2025-05-06 NOTE — PROGRESS NOTES
Video visit:  Start time: 12:00 PM  Stop time: 12:10 PM  Duration: 10 minutes  Patient location: At home  Provider location: [facility] virtual provider (remote).  [facility]        CHIEF COMPLAINT: Persistent positive COVID testing.  Persistent URI symptoms.      HPI: Patient is a 70-year-old male who tested positive for COVID about 10 days ago and was started on Paxlovid which he completed Tuesday of last week.  He did have a period where he started to feel better and then symptoms seem to rebound.  He has persistent sinus congestion, rhinorrhea and plugged ears at this time.  He tested positive again on Sunday of this week i.e. 3 days ago.  No shortness of breath.  No severe chronic respiratory disease.      ROS: See HPI otherwise normal.    Allergies   Allergen Reactions    Statins [Statins] Muscle Pain (Myalgia)    Valdecoxib      Ears turn purple  Facial flushing  Bextra      Current Outpatient Medications   Medication Sig Dispense Refill    amoxicillin-clavulanate (AUGMENTIN) 875-125 MG tablet Take 1 tablet by mouth 2 times daily for 7 days. 14 tablet 0    ezetimibe (ZETIA) 10 MG tablet Take 1 tablet (10 mg) by mouth daily. 90 tablet 1    lisinopril (ZESTRIL) 30 MG tablet Take 1 tablet (30 mg) by mouth daily. 90 tablet 1    methocarbamol (ROBAXIN) 500 MG tablet Take 1 tablet (500 mg) by mouth 4 times daily as needed for muscle spasms. 60 tablet 0    omeprazole (PRILOSEC) 40 MG DR capsule TAKE ONE CAPSULE BY MOUTH DAILY WITH DINNER 90 capsule 1    VITAMIN D, CHOLECALCIFEROL, PO Take 5,000 Units by mouth daily           PE: No acute distress on video visit.  He is alert and oriented.  He is not dyspneic appearing speaking in full sentences.        TREATMENT: None.      ASSESSMENT: Persistent COVID symptoms and positive test in a 70-year-old male.  This probably represents rebound from his initial COVID having been on Paxlovid.  Feel he may have a subsequent sinus infection as  well which we will treat.  Short-term follow-up discussed if not better.      DIAGNOSIS: COVID infection.  Acute sinusitis.      PLAN: Augmentin as instructed.  Plenty of fluids.  To be seen in person in 5 to 7 days if still ill, sooner if any worsening symptoms.

## 2025-05-07 ENCOUNTER — OFFICE VISIT (OUTPATIENT)
Dept: URGENT CARE | Facility: URGENT CARE | Age: 70
End: 2025-05-07
Payer: COMMERCIAL

## 2025-05-07 ENCOUNTER — ANCILLARY PROCEDURE (OUTPATIENT)
Dept: GENERAL RADIOLOGY | Facility: CLINIC | Age: 70
End: 2025-05-07
Attending: INTERNAL MEDICINE
Payer: COMMERCIAL

## 2025-05-07 VITALS
WEIGHT: 207 LBS | DIASTOLIC BLOOD PRESSURE: 82 MMHG | TEMPERATURE: 98.2 F | BODY MASS INDEX: 31.48 KG/M2 | SYSTOLIC BLOOD PRESSURE: 145 MMHG | OXYGEN SATURATION: 97 % | HEART RATE: 89 BPM | RESPIRATION RATE: 16 BRPM

## 2025-05-07 DIAGNOSIS — R05.1 ACUTE COUGH: ICD-10-CM

## 2025-05-07 DIAGNOSIS — U07.1 INFECTION DUE TO 2019 NOVEL CORONAVIRUS: ICD-10-CM

## 2025-05-07 DIAGNOSIS — U07.1 INFECTION DUE TO 2019 NOVEL CORONAVIRUS: Primary | ICD-10-CM

## 2025-05-07 PROCEDURE — 99214 OFFICE O/P EST MOD 30 MIN: CPT | Performed by: INTERNAL MEDICINE

## 2025-05-07 PROCEDURE — 3077F SYST BP >= 140 MM HG: CPT | Performed by: INTERNAL MEDICINE

## 2025-05-07 PROCEDURE — 3079F DIAST BP 80-89 MM HG: CPT | Performed by: INTERNAL MEDICINE

## 2025-05-07 PROCEDURE — 71046 X-RAY EXAM CHEST 2 VIEWS: CPT | Mod: TC | Performed by: RADIOLOGY

## 2025-05-07 RX ORDER — BENZONATATE 200 MG/1
200 CAPSULE ORAL 3 TIMES DAILY PRN
Qty: 30 CAPSULE | Refills: 0 | Status: SHIPPED | OUTPATIENT
Start: 2025-05-07

## 2025-05-07 RX ORDER — ALBUTEROL SULFATE 90 UG/1
2 INHALANT RESPIRATORY (INHALATION) EVERY 4 HOURS PRN
Qty: 18 G | Refills: 0 | Status: SHIPPED | OUTPATIENT
Start: 2025-05-07

## 2025-05-08 NOTE — PROGRESS NOTES
SUBJECTIVE:  Krishna Dugan is an 70 year old male who presents for not feeling well.  Pt got covid after his wife had it.  He took paxlovid.  Felt better for a day or two but then felt worse again.  Had a video visit yesterday and was started on augmentin for sinusitis and told had rebound covid.  Last test for covid was three days ago and was still positive.  Breathing feels like has drainage and phlegm going down throat.  Has nasal congestion and ear and sinus pressure.  No n/v/d.  No fevers, temps into 99s and sometimes chills or sweats.  Sometimes wakes up from cough.  Wife is concerned because she will be gone for a couple days and wants to make sure he's okay.    PMH:   has a past medical history of Diagnostic skin and sensitization tests (9/08 skin tests pos. for cat/dog ONLY (all other environmental allergens NEGATIVE)).  Patient Active Problem List   Diagnosis    Essential hypertension, benign    Hyperlipidemia    S/P colonoscopic polypectomy    Allergic rhinitis due to animal dander    Allergic rhinitis due to dust mite    Gastroesophageal reflux disease without esophagitis    Other testicular hypofunction    Vitamin D deficiency    Essential tremor    Sexual dysfunction    S/P rotator cuff repair    Prediabetes    Primary osteoarthritis of left knee    History of total left knee replacement     Social History     Socioeconomic History    Marital status:      Spouse name: None    Number of children: None    Years of education: None    Highest education level: None   Tobacco Use    Smoking status: Former     Current packs/day: 0.50     Average packs/day: 0.5 packs/day for 10.0 years (5.0 ttl pk-yrs)     Types: Cigarettes     Passive exposure: Past    Smokeless tobacco: Never   Vaping Use    Vaping status: Never Used   Substance and Sexual Activity    Alcohol use: No     Comment: very little    Drug use: No    Sexual activity: Not Currently     Partners: Female   Other Topics Concern     Parent/sibling w/ CABG, MI or angioplasty before 65F 55M? Yes     Comment: dad at 53, brother 51     Social Drivers of Health     Financial Resource Strain: High Risk (8/29/2024)    Financial Resource Strain     Within the past 12 months, have you or your family members you live with been unable to get utilities (heat, electricity) when it was really needed?: Yes   Food Insecurity: Low Risk  (8/29/2024)    Food Insecurity     Within the past 12 months, did you worry that your food would run out before you got money to buy more?: No     Within the past 12 months, did the food you bought just not last and you didn t have money to get more?: No   Transportation Needs: Low Risk  (8/29/2024)    Transportation Needs     Within the past 12 months, has lack of transportation kept you from medical appointments, getting your medicines, non-medical meetings or appointments, work, or from getting things that you need?: No   Physical Activity: Inactive (8/29/2024)    Exercise Vital Sign     Days of Exercise per Week: 0 days     Minutes of Exercise per Session: 0 min   Stress: No Stress Concern Present (8/29/2024)    [employer] - Occupational Stress Questionnaire     Feeling of Stress : Only a little   Social Connections: Unknown (8/29/2024)    Social Connection and Isolation Panel [NHANES]     Frequency of Social Gatherings with Friends and Family: Once a week   Interpersonal Safety: Low Risk  (4/11/2025)    Interpersonal Safety     Do you feel physically and emotionally safe where you currently live?: Yes     Within the past 12 months, have you been hit, slapped, kicked or otherwise physically hurt by someone?: No     Within the past 12 months, have you been humiliated or emotionally abused in other ways by your partner or ex-partner?: No   Housing Stability: Low Risk  (8/29/2024)    Housing Stability     Do you have housing? : Yes     Are you worried about losing your housing?: No     Family  History   Problem Relation Age of Onset    Coronary Artery Disease Father 53    Coronary Artery Disease Brother 50    Coronary Artery Disease Brother 50    Coronary Artery Disease Brother 60       ALLERGIES:  Statins [statins] and Valdecoxib    Current Outpatient Medications   Medication Sig Dispense Refill    amoxicillin-clavulanate (AUGMENTIN) 875-125 MG tablet Take 1 tablet by mouth 2 times daily for 7 days. 14 tablet 0    ezetimibe (ZETIA) 10 MG tablet Take 1 tablet (10 mg) by mouth daily. 90 tablet 1    lisinopril (ZESTRIL) 30 MG tablet Take 1 tablet (30 mg) by mouth daily. 90 tablet 1    methocarbamol (ROBAXIN) 500 MG tablet Take 1 tablet (500 mg) by mouth 4 times daily as needed for muscle spasms. 60 tablet 0    omeprazole (PRILOSEC) 40 MG DR capsule TAKE ONE CAPSULE BY MOUTH DAILY WITH DINNER 90 capsule 1    VITAMIN D, CHOLECALCIFEROL, PO Take 5,000 Units by mouth daily       No current facility-administered medications for this visit.         ROS:  ROS is done and is negative for general/constitutional, eye, ENT, Respiratory, cardiovascular, GI, , Skin, musculoskeletal except as noted elsewhere.  All other review of systems negative except as noted elsewhere.      OBJECTIVE:  BP (!) 145/82 (BP Location: Right arm, Patient Position: Sitting, Cuff Size: Adult Large)   Pulse 89   Temp 98.2  F (36.8  C) (Tympanic)   Resp 16   Wt 93.9 kg (207 lb)   SpO2 97%   BMI 31.48 kg/m    GENERAL APPEARANCE: Alert, in no acute distress  EYES: normal  EARS: External ears normal. Canals clear. TMs with mild clear effusions  NOSE:mildly inflamed mucosa  OROPHARYNX:normal  SINUSES: mildly tender over frontal and maxillary  NECK:No adenopathy,masses or thyromegaly  RESP: normal and clear to auscultation  CV:regular rate and rhythm and no murmurs, clicks, or gallops  ABDOMEN: Abdomen soft, non-tender. BS normal. No masses, organomegaly  SKIN: no ulcers, lesions or rash  MUSCULOSKELETAL:Musculoskeletal  normal      RESULTS  CXR - no infiltrate noted on my reading. Await radiology reading.   No results found for any visits on 05/07/25..  No results found for this or any previous visit (from the past 48 hours).    ASSESSMENT/PLAN:    ASSESSMENT / PLAN:  (U07.1) Infection due to 2019 novel coronavirus  (primary encounter diagnosis)  Comment: has had sxs c/with rebound covid and then developed sinusitis sxs.    Plan: XR Chest 2 Views        For now continue augmentin to treat sinus sxs.  No repeat of paxlovid.  Reviewed medication instructions and side effects. Follow up if experiences side effects.. I reviewed supportive care, otc meds to use if needed, expected course, and signs of concern.  Follow up as needed or if does not improve within 5 day(s) or if worsens in any way.  Reviewed red flag symptoms and is to go to the ER if experiences any of these.    (R05.1) Acute cough  Comment: due to covid.  Will rx meds to help with couh  Plan: benzonatate (TESSALON) 200 MG capsule,         albuterol (PROAIR HFA/PROVENTIL HFA/VENTOLIN         HFA) 108 (90 Base) MCG/ACT inhaler        Reviewed medication instructions and side effects. Follow up if experiences side effects.. I reviewed supportive care, otc meds to use if needed, expected course, and signs of concern.  Follow up as needed or if does not improve within 5 day(s) or if worsens in any way.  Reviewed red flag symptoms and is to go to the ER if experiences any of these.    See [facility] for orders, medications, letters, patient instructions  Time spent: 30 minutes spent during visit, reviewing test results, chart review, and charting on day of encounter  Edwina Gómez M.D.

## 2025-05-08 NOTE — PROGRESS NOTES
Urgent Care Clinic Visit  Rapid rooming was initiated.  Provider was consulted, patient will remain in room and provider will be with patient as soon as possible.  Chief Complaint   Patient presents with    Cough     Productive cough  - feel like he is drowning in phlegm - recently tested POS for Covid and was rx Plaxlovid sx seem to come and go    Fever     Low grade fever around 99.4, sometimes it is normal               5/7/2025     7:58 PM   Additional Questions   Roomed by emiliana ferrera   Accompanied by wife Diana

## 2025-05-10 DIAGNOSIS — E78.5 HYPERLIPIDEMIA LDL GOAL <100: ICD-10-CM

## 2025-05-10 DIAGNOSIS — Z82.49 FAMILY HISTORY OF ISCHEMIC HEART DISEASE: ICD-10-CM

## 2025-05-12 DIAGNOSIS — I10 BENIGN ESSENTIAL HYPERTENSION: ICD-10-CM

## 2025-05-12 RX ORDER — EZETIMIBE 10 MG/1
10 TABLET ORAL DAILY
Qty: 90 TABLET | Refills: 1 | Status: SHIPPED | OUTPATIENT
Start: 2025-05-12

## 2025-05-13 RX ORDER — LISINOPRIL 30 MG/1
30 TABLET ORAL DAILY
Qty: 90 TABLET | Refills: 0 | Status: SHIPPED | OUTPATIENT
Start: 2025-05-13

## 2025-05-28 ENCOUNTER — OFFICE VISIT (OUTPATIENT)
Dept: FAMILY MEDICINE | Facility: CLINIC | Age: 70
End: 2025-05-28
Payer: COMMERCIAL

## 2025-05-28 VITALS
DIASTOLIC BLOOD PRESSURE: 68 MMHG | RESPIRATION RATE: 17 BRPM | OXYGEN SATURATION: 98 % | TEMPERATURE: 98.1 F | BODY MASS INDEX: 30.92 KG/M2 | SYSTOLIC BLOOD PRESSURE: 135 MMHG | WEIGHT: 204 LBS | HEART RATE: 92 BPM | HEIGHT: 68 IN

## 2025-05-28 DIAGNOSIS — H93.13 TINNITUS OF BOTH EARS: ICD-10-CM

## 2025-05-28 DIAGNOSIS — R73.9 ELEVATED BLOOD SUGAR: ICD-10-CM

## 2025-05-28 DIAGNOSIS — E78.5 HYPERLIPIDEMIA LDL GOAL <100: ICD-10-CM

## 2025-05-28 DIAGNOSIS — I10 ESSENTIAL HYPERTENSION, BENIGN: Primary | ICD-10-CM

## 2025-05-28 DIAGNOSIS — J34.89 SINUS DRAINAGE: ICD-10-CM

## 2025-05-28 DIAGNOSIS — Z82.49 FAMILY HISTORY OF ISCHEMIC HEART DISEASE: ICD-10-CM

## 2025-05-28 DIAGNOSIS — R10.9 STOMACH PAIN: ICD-10-CM

## 2025-05-28 LAB
CHOLEST SERPL-MCNC: 207 MG/DL
EST. AVERAGE GLUCOSE BLD GHB EST-MCNC: 120 MG/DL
FASTING STATUS PATIENT QL REPORTED: YES
HBA1C MFR BLD: 5.8 % (ref 0–5.6)
HDLC SERPL-MCNC: 31 MG/DL
LDLC SERPL CALC-MCNC: 122 MG/DL
NONHDLC SERPL-MCNC: 176 MG/DL
TRIGL SERPL-MCNC: 270 MG/DL

## 2025-05-28 PROCEDURE — 3044F HG A1C LEVEL LT 7.0%: CPT | Performed by: PHYSICIAN ASSISTANT

## 2025-05-28 PROCEDURE — 83036 HEMOGLOBIN GLYCOSYLATED A1C: CPT | Performed by: PHYSICIAN ASSISTANT

## 2025-05-28 PROCEDURE — 80061 LIPID PANEL: CPT | Performed by: PHYSICIAN ASSISTANT

## 2025-05-28 PROCEDURE — 3078F DIAST BP <80 MM HG: CPT | Performed by: PHYSICIAN ASSISTANT

## 2025-05-28 PROCEDURE — G2211 COMPLEX E/M VISIT ADD ON: HCPCS | Performed by: PHYSICIAN ASSISTANT

## 2025-05-28 PROCEDURE — 3075F SYST BP GE 130 - 139MM HG: CPT | Performed by: PHYSICIAN ASSISTANT

## 2025-05-28 PROCEDURE — 99214 OFFICE O/P EST MOD 30 MIN: CPT | Performed by: PHYSICIAN ASSISTANT

## 2025-05-28 PROCEDURE — 36415 COLL VENOUS BLD VENIPUNCTURE: CPT | Performed by: PHYSICIAN ASSISTANT

## 2025-05-28 RX ORDER — LISINOPRIL 30 MG/1
30 TABLET ORAL DAILY
Qty: 90 TABLET | Refills: 1 | Status: SHIPPED | OUTPATIENT
Start: 2025-05-28

## 2025-05-28 RX ORDER — CETIRIZINE HYDROCHLORIDE 10 MG/1
10 TABLET ORAL DAILY
Qty: 90 TABLET | Refills: 1 | Status: SHIPPED | OUTPATIENT
Start: 2025-05-28

## 2025-05-28 NOTE — PROGRESS NOTES
"  Assessment & Plan     Essential hypertension, benign  At goal.  refilled  - lisinopril (ZESTRIL) 30 MG tablet; Take 1 tablet (30 mg) by mouth daily.    Family history of ischemic heart disease  Phone number to [facility] given to pt    Hyperlipidemia LDL goal <100  Follow up as needed.  Is now back on zetia   - Lipid panel reflex to direct LDL Fasting; Future  - Lipid panel reflex to direct LDL Fasting    Sinus drainage  Follow up as needed  - cetirizine (ZYRTEC) 10 MG tablet; Take 1 tablet (10 mg) by mouth daily.    Tinnitus of both ears  Long standing.      Elevated blood sugar  Follow up as needed  - Hemoglobin A1c; Future  - Hemoglobin A1c    Stomach pain  Monitor.  May be related to recent covid    Follow up in 6 months     The longitudinal plan of care for the diagnosis(es)/condition(s) as documented were addressed during this visit. Due to the added complexity in care, I will continue to support Brent in the subsequent management and with ongoing continuity of care.    BMI  Estimated body mass index is 31.03 kg/m  as calculated from the following:    Height as of this encounter: 1.727 m (5' 7.99\").    Weight as of this encounter: 92.5 kg (204 lb).   Weight management plan: not addressed           Subjective   Brent is a 70 year old, presenting for the following health issues:  Hypertension        5/28/2025    10:06 AM   Additional Questions   Roomed by Chelo   Accompanied by self     History of Present Illness       Reason for visit:  Physical   He is taking medications regularly.      Had covid a month ago.  Improving but ringing in ears is worse  Still needs to reschedule surgery.      Having stomach issue under rib cage.  Was a cramping pain.  Improved but still comes and goes.  Started about a month ago.  Might get worse after bm.  Started about the time he got covid.  No change in stools.      Gets sinus drainage and pnd and leads to cough.  Started in spring.      Hypertension Follow-up    Do you " "check your blood pressure regularly outside of the clinic? No   Are you following a low salt diet? No  Are your blood pressures ever more than 140 on the top number (systolic) OR more   than 90 on the bottom number (diastolic), for example 140/90?not  checking     BP Readings from Last 2 Encounters:   05/28/25 135/68   05/07/25 (!) 145/82     How many servings of fruits and vegetables do you eat daily? 1-2   On average, how many sweetened beverages do you drink each day (Examples: soda, juice, sweet tea, etc.  Do NOT count diet or artificially sweetened beverages)?   1  How many days per week do you exercise enough to make your heart beat faster? 3 or less  How many minutes a day do you exercise enough to make your heart beat faster? 9 or less  How many days per week do you miss taking your medication? 0              Objective    /68   Pulse 92   Temp 98.1  F (36.7  C) (Temporal)   Resp 17   Ht 1.727 m (5' 7.99\")   Wt 92.5 kg (204 lb)   SpO2 98%   BMI 31.03 kg/m    Body mass index is 31.03 kg/m .  Physical Exam  Constitutional:       General: He is not in acute distress.  HENT:      Right Ear: Tympanic membrane, ear canal and external ear normal.      Mouth/Throat:      Mouth: Mucous membranes are moist.      Pharynx: No oropharyngeal exudate or posterior oropharyngeal erythema.   Cardiovascular:      Rate and Rhythm: Normal rate and regular rhythm.   Pulmonary:      Effort: Pulmonary effort is normal.      Breath sounds: Normal breath sounds.   Abdominal:      General: Bowel sounds are normal.      Palpations: There is no mass.      Tenderness: There is no abdominal tenderness.   Lymphadenopathy:      Cervical: No cervical adenopathy.   Neurological:      Mental Status: He is alert.                    Signed Electronically by: Fay Cuellar PA-C    "

## 2025-05-28 NOTE — PROGRESS NOTES
{PROVIDER CHARTING PREFERENCE:121401}    Anibal Kennedy is a 70 year old, presenting for the following health issues:  Hypertension  {(!) Visit Details have not yet been documented.  Please enter Visit Details and then use this list to pull in documentation. (Optional):484564}  History of Present Illness       Reason for visit:  Physical   He is taking medications regularly.        {MA/LPN/RN Pre-Provider Visit Orders- hCG/UA/Strep (Optional):174233}  {SUPERLIST (Optional):148922}  {additonal problems for provider to add (Optional):308730}    {ROS Picklists (Optional):507011}      Objective    There were no vitals taken for this visit.  There is no height or weight on file to calculate BMI.  Physical Exam   {Exam List (Optional):105303}    {Diagnostic Test Results (Optional):644765}        Signed Electronically by: Fay Cuellar PA-C  {Email feedback regarding this note to [email or web address]   :239572}

## 2025-05-28 NOTE — PATIENT INSTRUCTIONS
Try zyrtec for the sinus drainage  Check when you last had colonoscopy   [phone] call to schedule with heart clinic    Patient Education

## 2025-05-30 ENCOUNTER — RESULTS FOLLOW-UP (OUTPATIENT)
Dept: FAMILY MEDICINE | Facility: CLINIC | Age: 70
End: 2025-05-30

## 2025-06-11 ENCOUNTER — RESULTS FOLLOW-UP (OUTPATIENT)
Dept: FAMILY MEDICINE | Facility: CLINIC | Age: 70
End: 2025-06-11

## 2025-06-11 ENCOUNTER — OFFICE VISIT (OUTPATIENT)
Dept: FAMILY MEDICINE | Facility: CLINIC | Age: 70
End: 2025-06-11
Payer: COMMERCIAL

## 2025-06-11 ENCOUNTER — ANCILLARY PROCEDURE (OUTPATIENT)
Dept: GENERAL RADIOLOGY | Facility: CLINIC | Age: 70
End: 2025-06-11
Attending: PHYSICIAN ASSISTANT
Payer: COMMERCIAL

## 2025-06-11 VITALS
HEIGHT: 68 IN | SYSTOLIC BLOOD PRESSURE: 132 MMHG | DIASTOLIC BLOOD PRESSURE: 62 MMHG | TEMPERATURE: 98.5 F | BODY MASS INDEX: 30.1 KG/M2 | HEART RATE: 79 BPM | WEIGHT: 198.6 LBS | OXYGEN SATURATION: 100 % | RESPIRATION RATE: 12 BRPM

## 2025-06-11 DIAGNOSIS — R10.10 UPPER ABDOMINAL PAIN: ICD-10-CM

## 2025-06-11 DIAGNOSIS — R10.10 UPPER ABDOMINAL PAIN: Primary | ICD-10-CM

## 2025-06-11 LAB
ALBUMIN UR-MCNC: NEGATIVE MG/DL
APPEARANCE UR: CLEAR
BASOPHILS # BLD AUTO: 0 10E3/UL (ref 0–0.2)
BASOPHILS NFR BLD AUTO: 0 %
BILIRUB UR QL STRIP: NEGATIVE
COLOR UR AUTO: YELLOW
EOSINOPHIL # BLD AUTO: 0.1 10E3/UL (ref 0–0.7)
EOSINOPHIL NFR BLD AUTO: 1 %
ERYTHROCYTE [DISTWIDTH] IN BLOOD BY AUTOMATED COUNT: 14 % (ref 10–15)
GLUCOSE UR STRIP-MCNC: NEGATIVE MG/DL
HCT VFR BLD AUTO: 42 % (ref 40–53)
HGB BLD-MCNC: 14.3 G/DL (ref 13.3–17.7)
HGB UR QL STRIP: NEGATIVE
IMM GRANULOCYTES # BLD: 0 10E3/UL
IMM GRANULOCYTES NFR BLD: 0 %
KETONES UR STRIP-MCNC: 15 MG/DL
LEUKOCYTE ESTERASE UR QL STRIP: NEGATIVE
LYMPHOCYTES # BLD AUTO: 3.1 10E3/UL (ref 0.8–5.3)
LYMPHOCYTES NFR BLD AUTO: 33 %
MCH RBC QN AUTO: 28.8 PG (ref 26.5–33)
MCHC RBC AUTO-ENTMCNC: 34 G/DL (ref 31.5–36.5)
MCV RBC AUTO: 85 FL (ref 78–100)
MONOCYTES # BLD AUTO: 0.9 10E3/UL (ref 0–1.3)
MONOCYTES NFR BLD AUTO: 10 %
NEUTROPHILS # BLD AUTO: 5.2 10E3/UL (ref 1.6–8.3)
NEUTROPHILS NFR BLD AUTO: 56 %
NITRATE UR QL: NEGATIVE
PH UR STRIP: 6 [PH] (ref 5–7)
PLATELET # BLD AUTO: 305 10E3/UL (ref 150–450)
RBC # BLD AUTO: 4.96 10E6/UL (ref 4.4–5.9)
SP GR UR STRIP: 1.01 (ref 1–1.03)
UROBILINOGEN UR STRIP-ACNC: 0.2 E.U./DL
WBC # BLD AUTO: 9.3 10E3/UL (ref 4–11)

## 2025-06-11 PROCEDURE — 83690 ASSAY OF LIPASE: CPT | Performed by: PHYSICIAN ASSISTANT

## 2025-06-11 PROCEDURE — 85025 COMPLETE CBC W/AUTO DIFF WBC: CPT | Performed by: PHYSICIAN ASSISTANT

## 2025-06-11 PROCEDURE — 74019 RADEX ABDOMEN 2 VIEWS: CPT | Mod: TC | Performed by: RADIOLOGY

## 2025-06-11 PROCEDURE — 99215 OFFICE O/P EST HI 40 MIN: CPT | Performed by: PHYSICIAN ASSISTANT

## 2025-06-11 PROCEDURE — 99417 PROLNG OP E/M EACH 15 MIN: CPT | Performed by: PHYSICIAN ASSISTANT

## 2025-06-11 PROCEDURE — 36415 COLL VENOUS BLD VENIPUNCTURE: CPT | Performed by: PHYSICIAN ASSISTANT

## 2025-06-11 PROCEDURE — 1125F AMNT PAIN NOTED PAIN PRSNT: CPT | Performed by: PHYSICIAN ASSISTANT

## 2025-06-11 PROCEDURE — 3078F DIAST BP <80 MM HG: CPT | Performed by: PHYSICIAN ASSISTANT

## 2025-06-11 PROCEDURE — 82150 ASSAY OF AMYLASE: CPT | Performed by: PHYSICIAN ASSISTANT

## 2025-06-11 PROCEDURE — 80053 COMPREHEN METABOLIC PANEL: CPT | Performed by: PHYSICIAN ASSISTANT

## 2025-06-11 PROCEDURE — 3075F SYST BP GE 130 - 139MM HG: CPT | Performed by: PHYSICIAN ASSISTANT

## 2025-06-11 PROCEDURE — 81003 URINALYSIS AUTO W/O SCOPE: CPT | Performed by: PHYSICIAN ASSISTANT

## 2025-06-11 ASSESSMENT — PAIN SCALES - GENERAL: PAINLEVEL_OUTOF10: MILD PAIN (2)

## 2025-06-11 NOTE — PROGRESS NOTES
Assessment & Plan     Upper abdominal pain  He does have a fair amount of stool, he will use stool softeners or MiraLAX in order to achieve good results.  We will await the rest of his laboratory workup and order CT scan of abdomen pelvis.  I did not order this is urgent because his symptoms have been ongoing for 1 month should he have any acute worsening of his symptoms he should be seen emergently patient understands and has agreed to the above plan.  He will also collect the H. pylori test that was ordered in  April.  I did give him the option of ADS though he would like to proceed as outlined in my plan.  - XR Abdomen 2 Views; Future  - UA Macroscopic with reflex to Microscopic and Culture - Lab Collect; Future  - CBC with platelets and differential; Future  - Comprehensive metabolic panel (BMP + Alb, Alk Phos, ALT, AST, Total. Bili, TP); Future  - Lipase; Future  - Amylase; Future  - UA Macroscopic with reflex to Microscopic and Culture - Lab Collect  - CBC with platelets and differential  - Comprehensive metabolic panel (BMP + Alb, Alk Phos, ALT, AST, Total. Bili, TP)  - Lipase  - Amylase  - CT Abdomen Pelvis w Contrast; Future      This chart documentation was completed in part with Dragon voice recognition software.  Documentation is reviewed after completion, however, some words and grammatical errors may remain.  Sury Mederos PA-C          I spent a total of 57 minutes on the day of the visit.   Time spent by me today doing chart review, history and exam, documentation and further activities per the note   Subjective   Brent is a 70 year old, presenting for the following health issues:  Stomach Concerns   Dx with pockets in the colon and hiatal hernia around April 2024; was being seen at [facility]      6/11/2025     2:28 PM   Additional Questions   Roomed by Mahsa   Accompanied by Self     History of Present Illness       Reason for visit:  Stomach pain  Symptom onset:  More than a  month  Symptoms include:  Low grade fever, tightness in abdomen  Symptom intensity:  Moderate  Symptom progression:  Staying the same  Had these symptoms before:  No  What makes it worse:  After bowel movement  What makes it better:  N/A    He eats 0-1 servings of fruits and vegetables daily.He consumes 1 sweetened beverage(s) daily.He exercises with enough effort to increase his heart rate 10 to 19 minutes per day.  He exercises with enough effort to increase his heart rate 3 or less days per week.   He is taking medications regularly.        - Seen by Nora April 11, 2025.  He had a history of hiatal hernia epigastric discomfort she encouraged increasing his fiber and completing the H. pylori test.  He has not sent in the H. pylori test yet he continues to take omeprazole.      -Diagnosed with COVID April 7.  When he was not getting better he was seen in the office and started on some Augmentin for ear pressure.    -Saw PCP May 28- lingering ab pain thought to possibly be related to COVID    - urgent care June 5, 2025-abdominal discomfort and multiple stools thought to be related to dietary changes.    His pain is lingering for over 1 month.  His pain occurs under his ribs and radiates out laterally but only to a certain point.  He states he does not get a side ache with it the pain it does not go out that far, it is not in his lower abdomen.  Bowel movements seem to increase his discomfort for a time.  He has daily bowel movements that are normal, they may be slightly smaller in amount due to eating less trying to eat less and slightly looser .  his pain is worse in the morning and seems to be improved about 3:00 or later.  He had a single day where he had 4 bowel movements in 1 day generally they were formed at the last bowel movement was looser and more crampy.  That is the day he went to urgent care at [facility] which was on June 5.  He reports some low-grade temps generally in the 99 range, yesterday it  "was at its highest at 100.  Yesterday he was a bit achy due to the elevated temp but otherwise does not feel poorly.  He has had some rare nausea no vomiting, no reflux or blood in his stool.  The pain does not worsen after eating in fact it feels better after eating.  He has tried Tylenol, reducing the amount of food he eats and trying to cut out lactose containing foods.  He has had some weight loss but he thinks it is for eating less he has not had night sweats        Review of Systems  Constitutional, HEENT, cardiovascular, pulmonary, gi and gu systems are negative, except as otherwise noted.      Objective    /62   Pulse 79   Temp 98.5  F (36.9  C) (Temporal)   Resp 12   Ht 1.727 m (5' 7.99\")   Wt 90.1 kg (198 lb 9.6 oz)   SpO2 100%   BMI 30.20 kg/m    Body mass index is 30.2 kg/m .  Physical Exam   GENERAL: alert and no distress  NECK: no adenopathy, no asymmetry, masses, or scars  RESP: lungs clear to auscultation - no rales, rhonchi or wheezes  CV: regular rate and rhythm, normal S1 S2, no S3 or S4, no murmur, click or rub, no peripheral edema  ABDOMEN: soft, mildly tender in the epigastric region otherwise nontender, no hepatosplenomegaly, no masses and bowel sounds normal no rebound or guarding   MS: no gross musculoskeletal defects noted, no edema  SKIN: no suspicious lesions or rashes  NEURO: Normal strength and tone, mentation intact and speech normal  PSYCH: mentation appears normal, affect normal/bright    Xray - Reviewed and interpreted by me.  Mild to moderate amount of stool noted, no abnormal bowel gas pattern noted official report from rad pending   Results for orders placed or performed in visit on 06/11/25   UA Macroscopic with reflex to Microscopic and Culture - Lab Collect     Status: Abnormal    Specimen: Urine, NOS   Result Value Ref Range    Color Urine Yellow Colorless, Straw, Light Yellow, Yellow    Appearance Urine Clear Clear    Glucose Urine Negative Negative mg/dL    " Bilirubin Urine Negative Negative    Ketones Urine 15 (A) Negative mg/dL    Specific Gravity Urine 1.010 1.003 - 1.035    Blood Urine Negative Negative    pH Urine 6.0 5.0 - 7.0    Protein Albumin Urine Negative Negative mg/dL    Urobilinogen Urine 0.2 0.2, 1.0 E.U./dL    Nitrite Urine Negative Negative    Leukocyte Esterase Urine Negative Negative    Narrative    Microscopic not indicated   CBC with platelets and differential     Status: None   Result Value Ref Range    WBC Count 9.3 4.0 - 11.0 10e3/uL    RBC Count 4.96 4.40 - 5.90 10e6/uL    Hemoglobin 14.3 13.3 - 17.7 g/dL    Hematocrit 42.0 40.0 - 53.0 %    MCV 85 78 - 100 fL    MCH 28.8 26.5 - 33.0 pg    MCHC 34.0 31.5 - 36.5 g/dL    RDW 14.0 10.0 - 15.0 %    Platelet Count 305 150 - 450 10e3/uL    % Neutrophils 56 %    % Lymphocytes 33 %    % Monocytes 10 %    % Eosinophils 1 %    % Basophils 0 %    % Immature Granulocytes 0 %    Absolute Neutrophils 5.2 1.6 - 8.3 10e3/uL    Absolute Lymphocytes 3.1 0.8 - 5.3 10e3/uL    Absolute Monocytes 0.9 0.0 - 1.3 10e3/uL    Absolute Eosinophils 0.1 0.0 - 0.7 10e3/uL    Absolute Basophils 0.0 0.0 - 0.2 10e3/uL    Absolute Immature Granulocytes 0.0 <=0.4 10e3/uL   CBC with platelets and differential     Status: None    Narrative    The following orders were created for panel order CBC with platelets and differential.  Procedure                               Abnormality         Status                     ---------                               -----------         ------                     CBC with platelets and ...[[id number]]                      Final result                 Please view results for these tests on the individual orders.           Signed Electronically by: Sury Mederos PA-C

## 2025-06-11 NOTE — PATIENT INSTRUCTIONS
You do have a moderate amount of stool in your colon.  Please use a stool softener or miralax daily in order to have frequent bowel movements.  I will await the results of your results and  notify you of them as they come back and I will order the CT scan of your abdomen and pelvis.  They will call you schedule.  Please go to the ER if your pain increases or your if your temperature increases over 100.4 or higher.  Sury Mederos PA-C    [[id number]],[[id number]],[[id number]],[[phone]]

## 2025-06-12 ENCOUNTER — LAB (OUTPATIENT)
Dept: LAB | Facility: CLINIC | Age: 70
End: 2025-06-12
Payer: COMMERCIAL

## 2025-06-12 LAB
ALBUMIN SERPL BCG-MCNC: 4.8 G/DL (ref 3.5–5.2)
ALP SERPL-CCNC: 61 U/L (ref 40–150)
ALT SERPL W P-5'-P-CCNC: 25 U/L (ref 0–70)
AMYLASE SERPL-CCNC: 36 U/L (ref 28–100)
ANION GAP SERPL CALCULATED.3IONS-SCNC: 12 MMOL/L (ref 7–15)
AST SERPL W P-5'-P-CCNC: 39 U/L (ref 0–45)
BILIRUB SERPL-MCNC: 0.6 MG/DL
BUN SERPL-MCNC: 9.4 MG/DL (ref 8–23)
CALCIUM SERPL-MCNC: 9.9 MG/DL (ref 8.8–10.4)
CHLORIDE SERPL-SCNC: 100 MMOL/L (ref 98–107)
CREAT SERPL-MCNC: 1.02 MG/DL (ref 0.67–1.17)
EGFRCR SERPLBLD CKD-EPI 2021: 79 ML/MIN/1.73M2
GLUCOSE SERPL-MCNC: 98 MG/DL (ref 70–99)
HCO3 SERPL-SCNC: 26 MMOL/L (ref 22–29)
LIPASE SERPL-CCNC: 35 U/L (ref 13–60)
POTASSIUM SERPL-SCNC: 4.1 MMOL/L (ref 3.4–5.3)
PROT SERPL-MCNC: 8.2 G/DL (ref 6.4–8.3)
SODIUM SERPL-SCNC: 138 MMOL/L (ref 135–145)

## 2025-06-13 ENCOUNTER — MYC MEDICAL ADVICE (OUTPATIENT)
Dept: FAMILY MEDICINE | Facility: CLINIC | Age: 70
End: 2025-06-13
Payer: COMMERCIAL

## 2025-06-13 ENCOUNTER — RESULTS FOLLOW-UP (OUTPATIENT)
Dept: FAMILY MEDICINE | Facility: CLINIC | Age: 70
End: 2025-06-13

## 2025-06-23 NOTE — TELEPHONE ENCOUNTER
Called patient, scheduled him to be seen per his request to discuss hernia as well as labs (fatty liver).    Future Appointments 6/23/2025 - 12/20/2025        Date Visit Type Length Department Provider     7/2/2025 11:30 AM OFFICE VISIT 30 min  FAMILY PRACTICE Sury Mederos PA-C    Location Instructions:     [facility] is located at [address] [address], one mile north of the [place] [address] exit off of [address] [address]. From [address] [facility], exit to turn west on [address] [place] [address], then turn south on [address].                        Mahsa Mancini MA

## 2025-07-02 ENCOUNTER — OFFICE VISIT (OUTPATIENT)
Dept: FAMILY MEDICINE | Facility: CLINIC | Age: 70
End: 2025-07-02
Payer: COMMERCIAL

## 2025-07-02 VITALS
DIASTOLIC BLOOD PRESSURE: 60 MMHG | WEIGHT: 198.7 LBS | TEMPERATURE: 98.3 F | HEART RATE: 83 BPM | OXYGEN SATURATION: 98 % | BODY MASS INDEX: 30.11 KG/M2 | SYSTOLIC BLOOD PRESSURE: 138 MMHG | HEIGHT: 68 IN | RESPIRATION RATE: 13 BRPM

## 2025-07-02 DIAGNOSIS — Z82.49 FAMILY HISTORY OF ISCHEMIC HEART DISEASE: ICD-10-CM

## 2025-07-02 DIAGNOSIS — E78.5 HYPERLIPIDEMIA LDL GOAL <100: Primary | ICD-10-CM

## 2025-07-02 DIAGNOSIS — I10 BENIGN ESSENTIAL HYPERTENSION: ICD-10-CM

## 2025-07-02 DIAGNOSIS — K76.0 HEPATIC STEATOSIS: ICD-10-CM

## 2025-07-02 DIAGNOSIS — Z12.5 SCREENING FOR PROSTATE CANCER: ICD-10-CM

## 2025-07-02 DIAGNOSIS — K40.91 UNILATERAL RECURRENT INGUINAL HERNIA WITHOUT OBSTRUCTION OR GANGRENE: ICD-10-CM

## 2025-07-02 DIAGNOSIS — N40.0 PROSTATE ENLARGEMENT: ICD-10-CM

## 2025-07-02 PROCEDURE — 3075F SYST BP GE 130 - 139MM HG: CPT | Performed by: PHYSICIAN ASSISTANT

## 2025-07-02 PROCEDURE — G0103 PSA SCREENING: HCPCS | Performed by: PHYSICIAN ASSISTANT

## 2025-07-02 PROCEDURE — 3078F DIAST BP <80 MM HG: CPT | Performed by: PHYSICIAN ASSISTANT

## 2025-07-02 PROCEDURE — 99214 OFFICE O/P EST MOD 30 MIN: CPT | Performed by: PHYSICIAN ASSISTANT

## 2025-07-02 PROCEDURE — 1126F AMNT PAIN NOTED NONE PRSNT: CPT | Performed by: PHYSICIAN ASSISTANT

## 2025-07-02 PROCEDURE — 36415 COLL VENOUS BLD VENIPUNCTURE: CPT | Performed by: PHYSICIAN ASSISTANT

## 2025-07-02 ASSESSMENT — PAIN SCALES - GENERAL: PAINLEVEL_OUTOF10: NO PAIN (0)

## 2025-07-02 NOTE — PROGRESS NOTES
Assessment & Plan     Hyperlipidemia LDL goal <100  We will have him improve his diet he has been eating large amounts of saturated fat in the form of butter and red meats, due to his significant family history of heart disease we also will refer him to cardiology for preventative measures.  He is resistant to using statins but will continue with his zetia until he can see car had considered starting him on fenofibrate but we will have him work first withdio, changing his diet which I think will go along way to improve his triglycerides  - Lipid panel reflex to direct LDL Fasting; Future  - Adult Cardiology Eval  Referral; Future    Family history of ischemic heart disease  Plan as above  - Adult Cardiology Eval  Referral; Future    Prostate enlargement  Screening for prostate cancer  Await PSA if elevated will have him see urology  - PSA, screen; Future  - PSA, screen    Benign essential hypertension  Blood pressure just within normal limits will continue to monitor weight loss would be very effective in order to improve his hypertensive control  - Adult Cardiology Eval  Referral; Future    Hepatic steatosis  We discussed the importance of controlling his cholesterol and losing weight in regards to management of his hepatic steatosis.  I think improving his diet as above would help with this we should routinely monitor his liver function tests and consider a FibroScan in the future    Unilateral recurrent inguinal hernia without obstruction or gangrene  He is not having significant pain at this time, he will monitor it if his pain becomes more consistent I would recommend referral back to general surgery     history of constipation-  Much improved with routine stool softeners, and send using Dulcolax I recommended he use MiraLAX as it is a safer long-term option.  He should continue to eat plenty of fiber in his diet and drink plenty of water        This chart documentation was  completed in part with Dragon voice recognition software.  Documentation is reviewed after completion, however, some words and grammatical errors may remain.  DANTE Matias   Brent is a 70 year old, presenting for the following health issues:  Hernia and Results (Discuss lab results.)        7/2/2025    11:20 AM   Additional Questions   Roomed by BEN   Accompanied by self     History of Present Illness       Reason for visit:  Follow up    He eats 0-1 servings of fruits and vegetables daily.He consumes 1 sweetened beverage(s) daily.He exercises with enough effort to increase his heart rate 10 to 19 minutes per day.  He exercises with enough effort to increase his heart rate 3 or less days per week.   He is taking medications regularly.        Discuss hernia and lab results.  He is here today to discuss his CT scan results.  Thankfully, his abdominal pain that he has been suffering with seems to be resolved with the use of Dulcolax.  He has not had any significant abdominal pain since starting the Dulcolax.  He did have a short period of pain 2 days ago but it was quick and did not come back.  He has a small hiatal hernia that he was aware of he is using omeprazole for this.    He was noted to have hepatic steatosis.  He was surprised by this because he does not use alcohol routinely very rarely just on special occasions.  He does have elevated cholesterol he is using Zetia currently in treatment of this.  He did try statins many years ago but did not tolerate them at all, he does not ever want to take them again.  He has such significant body pain he was wondering how he was going to manage working the rest of his life.  He does exercise his dog he walks about 1/4 mile a day but does not do any more exercise than that he does have a significant family history of coronary artery disease in brothers and his father.  It is early onset is well typically in the 50s.  Patient states he did not smoke  "whereas his other family members with heart disease have.  He admits his diet could be better.  He does use a lot of butter, 1.5 pounds per week, and he eats a lot of meat.     he also has diverticula on his CT scan that did not show any sign of infection.  He did show prostate enlargement.  He is not aware of any family history of prostate cancer.  For the past few months he has noted that he needs to push a little at the end of urination on the order to fully empty his bladder.  He has a normal stream he does not have nocturia and does not have any postvoid dribbling.    He was frustrated to see that he has a recurrence of a left inguinal hernia.  He gets occasional pain with this.  He had bilateral hernia repair done September 6, 2024.              Review of Systems  Constitutional, HEENT, cardiovascular, pulmonary, gi and gu systems are negative, except as otherwise noted.      Objective    /60   Pulse 83   Temp 98.3  F (36.8  C) (Temporal)   Resp 13   Ht 1.727 m (5' 7.99\")   Wt 90.1 kg (198 lb 11.2 oz)   SpO2 98%   BMI 30.22 kg/m    Body mass index is 30.22 kg/m .  Physical Exam   GENERAL: alert and no distress  NECK: no adenopathy, no asymmetry, masses, or scars  RESP: lungs clear to auscultation - no rales, rhonchi or wheezes  CV: regular rate and rhythm, normal S1 S2, no S3 or S4, no murmur, click or rub, no peripheral edema  ABDOMEN: soft, nontender, no hepatosplenomegaly, no masses and bowel sounds normal  MS: no gross musculoskeletal defects noted, no edema  NEURO: Normal strength and tone, mentation intact and speech normal  PSYCH: mentation appears normal, affect normal/bright    Office Visit on 06/11/2025   Component Date Value Ref Range Status    Color Urine 06/11/2025 Yellow  Colorless, Straw, Light Yellow, Yellow Final    Appearance Urine 06/11/2025 Clear  Clear Final    Glucose Urine 06/11/2025 Negative  Negative mg/dL Final    Bilirubin Urine 06/11/2025 Negative  Negative Final    " Ketones Urine 06/11/2025 15 (A)  Negative mg/dL Final    Specific Gravity Urine 06/11/2025 1.010  1.003 - 1.035 Final    Blood Urine 06/11/2025 Negative  Negative Final    pH Urine 06/11/2025 6.0  5.0 - 7.0 Final    Protein Albumin Urine 06/11/2025 Negative  Negative mg/dL Final    Urobilinogen Urine 06/11/2025 0.2  0.2, 1.0 E.U./dL Final    Nitrite Urine 06/11/2025 Negative  Negative Final    Leukocyte Esterase Urine 06/11/2025 Negative  Negative Final    Sodium 06/11/2025 138  135 - 145 mmol/L Final    Potassium 06/11/2025 4.1  3.4 - 5.3 mmol/L Final    Carbon Dioxide (CO2) 06/11/2025 26  22 - 29 mmol/L Final    Anion Gap 06/11/2025 12  7 - 15 mmol/L Final    Urea Nitrogen 06/11/2025 9.4  8.0 - 23.0 mg/dL Final    Creatinine 06/11/2025 1.02  0.67 - 1.17 mg/dL Final    GFR Estimate 06/11/2025 79  >60 mL/min/1.73m2 Final    eGFR calculated using 2021 CKD-EPI equation.    Calcium 06/11/2025 9.9  8.8 - 10.4 mg/dL Final    Chloride 06/11/2025 100  98 - 107 mmol/L Final    Glucose 06/11/2025 98  70 - 99 mg/dL Final    Alkaline Phosphatase 06/11/2025 61  40 - 150 U/L Final    AST 06/11/2025 39  0 - 45 U/L Final    ALT 06/11/2025 25  0 - 70 U/L Final    Protein Total 06/11/2025 8.2  6.4 - 8.3 g/dL Final    Albumin 06/11/2025 4.8  3.5 - 5.2 g/dL Final    Bilirubin Total 06/11/2025 0.6  <=1.2 mg/dL Final    Lipase 06/11/2025 35  13 - 60 U/L Final    Amylase 06/11/2025 36  28 - 100 U/L Final    WBC Count 06/11/2025 9.3  4.0 - 11.0 10e3/uL Final    RBC Count 06/11/2025 4.96  4.40 - 5.90 10e6/uL Final    Hemoglobin 06/11/2025 14.3  13.3 - 17.7 g/dL Final    Hematocrit 06/11/2025 42.0  40.0 - 53.0 % Final    MCV 06/11/2025 85  78 - 100 fL Final    MCH 06/11/2025 28.8  26.5 - 33.0 pg Final    MCHC 06/11/2025 34.0  31.5 - 36.5 g/dL Final    RDW 06/11/2025 14.0  10.0 - 15.0 % Final    Platelet Count 06/11/2025 305  150 - 450 10e3/uL Final    % Neutrophils 06/11/2025 56  % Final    % Lymphocytes 06/11/2025 33  % Final    %  Monocytes 06/11/2025 10  % Final    % Eosinophils 06/11/2025 1  % Final    % Basophils 06/11/2025 0  % Final    % Immature Granulocytes 06/11/2025 0  % Final    Absolute Neutrophils 06/11/2025 5.2  1.6 - 8.3 10e3/uL Final    Absolute Lymphocytes 06/11/2025 3.1  0.8 - 5.3 10e3/uL Final    Absolute Monocytes 06/11/2025 0.9  0.0 - 1.3 10e3/uL Final    Absolute Eosinophils 06/11/2025 0.1  0.0 - 0.7 10e3/uL Final    Absolute Basophils 06/11/2025 0.0  0.0 - 0.2 10e3/uL Final    Absolute Immature Granulocytes 06/11/2025 0.0  <=0.4 10e3/uL Final   EXAM: CT ABDOMEN PELVIS W CONTRAST  LOCATION: [facility]  DATE: 6/19/2025     INDICATION: Upper abdominal pain for 1 month, not improving, not worsening, low grade temp 99F intermittently.  COMPARISON: Abdomen and pelvis CT from 10/25/2019.  TECHNIQUE: CT scan of the abdomen and pelvis was performed following injection of IV contrast. Multiplanar reformats were obtained. Dose reduction techniques were used.  CONTRAST: Isovue-370,  97 mL.     FINDINGS:   LOWER CHEST: Clear lung bases.     HEPATOBILIARY: Mild diffuse hepatic steatosis with borderline hepatomegaly (17 cm craniocaudal). Probable cholelithiasis, but the gallbladder is not distended or inflamed.     PANCREAS: Normal.     SPLEEN: Normal.     ADRENAL GLANDS: Normal.     KIDNEYS/BLADDER: Normal.     BOWEL: Small hiatal hernia. No gastric or bowel distention. Normal appendix. Several noninflamed diverticula throughout the colon. No intraperitoneal fluid or pneumoperitoneum.     LYMPH NODES: Normal.     VASCULATURE: Normal.     PELVIC ORGANS: Mild prostate gland enlargement without significant urinary bladder distention at the time of imaging.     MUSCULOSKELETAL: Bilateral inguinal herniorrhaphy with small residual/recurrent left-sided direct inguinal hernia with fat protruding along the inferior margin of the mesh repair into the inguinal canal. Tiny fat-containing umbilical hernia. No bowel    herniation. No acute or aggressive osseous abnormalities.                                                                      IMPRESSION:   1.  No acute findings or explanation for the provided clinical symptoms.        Signed Electronically by: Sury Mederos PA-C

## 2025-07-02 NOTE — PATIENT INSTRUCTIONS
If you need more help with constipation use miralax - use 1/2 -1 capful daily or as needed. This is a better long term option than dulcolax .  You may use the dulcolax only as needed.

## 2025-07-03 ENCOUNTER — PATIENT OUTREACH (OUTPATIENT)
Dept: CARE COORDINATION | Facility: CLINIC | Age: 70
End: 2025-07-03
Payer: COMMERCIAL

## 2025-07-03 ENCOUNTER — RESULTS FOLLOW-UP (OUTPATIENT)
Dept: FAMILY MEDICINE | Facility: CLINIC | Age: 70
End: 2025-07-03

## 2025-07-03 LAB — PSA SERPL DL<=0.01 NG/ML-MCNC: 1.62 NG/ML (ref 0–6.5)

## 2025-07-07 ENCOUNTER — PATIENT OUTREACH (OUTPATIENT)
Dept: CARE COORDINATION | Facility: CLINIC | Age: 70
End: 2025-07-07
Payer: COMMERCIAL

## 2025-08-04 ENCOUNTER — PATIENT OUTREACH (OUTPATIENT)
Dept: CARE COORDINATION | Facility: CLINIC | Age: 70
End: 2025-08-04
Payer: COMMERCIAL

## 2025-09-02 ENCOUNTER — PREP FOR PROCEDURE (OUTPATIENT)
Dept: ORTHOPEDICS | Facility: CLINIC | Age: 70
End: 2025-09-02
Payer: COMMERCIAL

## 2025-09-02 DIAGNOSIS — M17.12 PRIMARY OSTEOARTHRITIS OF LEFT KNEE: Primary | ICD-10-CM

## (undated) DEVICE — SU STRATAFIX PDS PLUS 2-0 SPIRAL SH 23CM SXPP1B433

## (undated) DEVICE — CATH TRAY FOLEY SURESTEP 16FR DRAIN BAG STATOCK A899916

## (undated) DEVICE — GLOVE BIOGEL PI INDICATOR 8.0 LF 41680

## (undated) DEVICE — KIT ENDO TURNOVER/PROCEDURE CARRY-ON 101822

## (undated) DEVICE — DAVINCI XI SEAL UNIVERSAL 5-12MM 470500

## (undated) DEVICE — DAVINCI XI DRAPE ARM 470015

## (undated) DEVICE — DAVINCI HOT SHEARS TIP COVER  400180

## (undated) DEVICE — DAVINCI XI OBTURATOR BLADELESS 8MM 470359

## (undated) DEVICE — KIT PATIENT POSITIONING PIGAZZI LATEX FREE 40580

## (undated) DEVICE — NDL INSUFFLATION 120MM VERRES 172015

## (undated) DEVICE — SYR 50ML SLIP TIP W/O NDL 309654

## (undated) DEVICE — SU VICRYL 2-0 SH 27" UND J417H

## (undated) DEVICE — DAVINCI XI DRAPE COLUMN 470341

## (undated) DEVICE — PACK GENERAL LAPAOSCOPY

## (undated) DEVICE — TUBING SUCTION 12"X1/4" N612

## (undated) DEVICE — DRSG KERLIX 4 1/2"X4YDS ROLL 6730

## (undated) DEVICE — DRAPE U SPLIT 74X120" 29440

## (undated) DEVICE — ESU GROUND PAD UNIVERSAL W/O CORD

## (undated) DEVICE — SOL WATER IRRIG 1000ML BOTTLE 2F7114

## (undated) DEVICE — SU MONOCRYL 4-0 PS-2 18" UND Y496G

## (undated) DEVICE — LUBRICATING JELLY 4.25OZ

## (undated) DEVICE — SYSTEM LAPAROVUE VISIBILITY LAPVUE10

## (undated) DEVICE — GLOVE BIOGEL PI ULTRATOUCH G SZ 7.5 42175

## (undated) DEVICE — SU DERMABOND ADVANCED .7ML DNX12

## (undated) DEVICE — SUCTION MANIFOLD NEPTUNE 2 SYS 4 PORT 0702-020-000

## (undated) RX ORDER — FENTANYL CITRATE 50 UG/ML
INJECTION, SOLUTION INTRAMUSCULAR; INTRAVENOUS
Status: DISPENSED
Start: 2024-09-06

## (undated) RX ORDER — HYDROMORPHONE HYDROCHLORIDE 1 MG/ML
INJECTION, SOLUTION INTRAMUSCULAR; INTRAVENOUS; SUBCUTANEOUS
Status: DISPENSED
Start: 2024-09-06

## (undated) RX ORDER — KETOROLAC TROMETHAMINE 30 MG/ML
INJECTION, SOLUTION INTRAMUSCULAR; INTRAVENOUS
Status: DISPENSED
Start: 2024-09-06

## (undated) RX ORDER — DEXAMETHASONE SODIUM PHOSPHATE 10 MG/ML
INJECTION, SOLUTION INTRAMUSCULAR; INTRAVENOUS
Status: DISPENSED
Start: 2024-09-06

## (undated) RX ORDER — BUPIVACAINE HYDROCHLORIDE AND EPINEPHRINE 2.5; 5 MG/ML; UG/ML
INJECTION, SOLUTION EPIDURAL; INFILTRATION; INTRACAUDAL; PERINEURAL
Status: DISPENSED
Start: 2024-09-06

## (undated) RX ORDER — PROPOFOL 10 MG/ML
INJECTION, EMULSION INTRAVENOUS
Status: DISPENSED
Start: 2024-09-06